# Patient Record
Sex: FEMALE | Race: WHITE | Employment: OTHER | URBAN - METROPOLITAN AREA
[De-identification: names, ages, dates, MRNs, and addresses within clinical notes are randomized per-mention and may not be internally consistent; named-entity substitution may affect disease eponyms.]

---

## 2019-06-09 ENCOUNTER — HOSPITAL ENCOUNTER (INPATIENT)
Age: 79
LOS: 2 days | Discharge: HOME HEALTH | DRG: 065 | End: 2019-06-11
Attending: EMERGENCY MEDICINE | Admitting: INTERNAL MEDICINE
Payer: MEDICARE

## 2019-06-09 ENCOUNTER — APPOINTMENT (OUTPATIENT)
Dept: GENERAL RADIOLOGY | Age: 79
DRG: 065 | End: 2019-06-09
Payer: MEDICARE

## 2019-06-09 ENCOUNTER — APPOINTMENT (OUTPATIENT)
Dept: CT IMAGING | Age: 79
DRG: 065 | End: 2019-06-09
Payer: MEDICARE

## 2019-06-09 DIAGNOSIS — I48.91 ATRIAL FIBRILLATION, UNSPECIFIED TYPE (HCC): ICD-10-CM

## 2019-06-09 DIAGNOSIS — I63.9 CEREBROVASCULAR ACCIDENT (CVA), UNSPECIFIED MECHANISM (HCC): Primary | ICD-10-CM

## 2019-06-09 LAB
ANION GAP SERPL CALCULATED.3IONS-SCNC: 13 MMOL/L (ref 7–16)
APTT: 26.5 SEC (ref 24.5–35.1)
BASOPHILS ABSOLUTE: 0.05 E9/L (ref 0–0.2)
BASOPHILS RELATIVE PERCENT: 0.8 % (ref 0–2)
BILIRUBIN URINE: NEGATIVE
BLOOD, URINE: NEGATIVE
BUN BLDV-MCNC: 25 MG/DL (ref 8–23)
CALCIUM SERPL-MCNC: 9.2 MG/DL (ref 8.6–10.2)
CHLORIDE BLD-SCNC: 104 MMOL/L (ref 98–107)
CLARITY: CLEAR
CO2: 22 MMOL/L (ref 22–29)
COLOR: YELLOW
CREAT SERPL-MCNC: 1.1 MG/DL (ref 0.5–1)
EKG ATRIAL RATE: 83 BPM
EKG Q-T INTERVAL: 362 MS
EKG QRS DURATION: 68 MS
EKG QTC CALCULATION (BAZETT): 442 MS
EKG R AXIS: 56 DEGREES
EKG T AXIS: -17 DEGREES
EKG VENTRICULAR RATE: 90 BPM
EOSINOPHILS ABSOLUTE: 0.15 E9/L (ref 0.05–0.5)
EOSINOPHILS RELATIVE PERCENT: 2.5 % (ref 0–6)
GFR AFRICAN AMERICAN: 58
GFR NON-AFRICAN AMERICAN: 48 ML/MIN/1.73
GLUCOSE BLD-MCNC: 126 MG/DL (ref 74–99)
GLUCOSE URINE: NEGATIVE MG/DL
HCT VFR BLD CALC: 40.8 % (ref 34–48)
HEMOGLOBIN: 13.6 G/DL (ref 11.5–15.5)
IMMATURE GRANULOCYTES #: 0.02 E9/L
IMMATURE GRANULOCYTES %: 0.3 % (ref 0–5)
INR BLD: 1.1
KETONES, URINE: NEGATIVE MG/DL
LEUKOCYTE ESTERASE, URINE: NEGATIVE
LYMPHOCYTES ABSOLUTE: 0.7 E9/L (ref 1.5–4)
LYMPHOCYTES RELATIVE PERCENT: 11.8 % (ref 20–42)
MCH RBC QN AUTO: 29.1 PG (ref 26–35)
MCHC RBC AUTO-ENTMCNC: 33.3 % (ref 32–34.5)
MCV RBC AUTO: 87.2 FL (ref 80–99.9)
MONOCYTES ABSOLUTE: 0.34 E9/L (ref 0.1–0.95)
MONOCYTES RELATIVE PERCENT: 5.7 % (ref 2–12)
NEUTROPHILS ABSOLUTE: 4.66 E9/L (ref 1.8–7.3)
NEUTROPHILS RELATIVE PERCENT: 78.9 % (ref 43–80)
NITRITE, URINE: NEGATIVE
PDW BLD-RTO: 13.7 FL (ref 11.5–15)
PH UA: 5 (ref 5–9)
PLATELET # BLD: 211 E9/L (ref 130–450)
PMV BLD AUTO: 10 FL (ref 7–12)
POTASSIUM REFLEX MAGNESIUM: 3.8 MMOL/L (ref 3.5–5)
PRO-BNP: 1548 PG/ML (ref 0–450)
PROTEIN UA: NEGATIVE MG/DL
PROTHROMBIN TIME: 12.2 SEC (ref 9.3–12.4)
RBC # BLD: 4.68 E12/L (ref 3.5–5.5)
SODIUM BLD-SCNC: 139 MMOL/L (ref 132–146)
SPECIFIC GRAVITY UA: 1.01 (ref 1–1.03)
TROPONIN: <0.01 NG/ML (ref 0–0.03)
TSH SERPL DL<=0.05 MIU/L-ACNC: 2.71 UIU/ML (ref 0.27–4.2)
UROBILINOGEN, URINE: 0.2 E.U./DL
WBC # BLD: 5.9 E9/L (ref 4.5–11.5)

## 2019-06-09 PROCEDURE — 70450 CT HEAD/BRAIN W/O DYE: CPT

## 2019-06-09 PROCEDURE — 93010 ELECTROCARDIOGRAM REPORT: CPT | Performed by: INTERNAL MEDICINE

## 2019-06-09 PROCEDURE — 99223 1ST HOSP IP/OBS HIGH 75: CPT | Performed by: SURGERY

## 2019-06-09 PROCEDURE — 70498 CT ANGIOGRAPHY NECK: CPT

## 2019-06-09 PROCEDURE — 99221 1ST HOSP IP/OBS SF/LOW 40: CPT | Performed by: PSYCHIATRY & NEUROLOGY

## 2019-06-09 PROCEDURE — 93005 ELECTROCARDIOGRAM TRACING: CPT | Performed by: EMERGENCY MEDICINE

## 2019-06-09 PROCEDURE — 81003 URINALYSIS AUTO W/O SCOPE: CPT

## 2019-06-09 PROCEDURE — 85730 THROMBOPLASTIN TIME PARTIAL: CPT

## 2019-06-09 PROCEDURE — 99285 EMERGENCY DEPT VISIT HI MDM: CPT

## 2019-06-09 PROCEDURE — 6360000004 HC RX CONTRAST MEDICATION: Performed by: STUDENT IN AN ORGANIZED HEALTH CARE EDUCATION/TRAINING PROGRAM

## 2019-06-09 PROCEDURE — 84484 ASSAY OF TROPONIN QUANT: CPT

## 2019-06-09 PROCEDURE — 85025 COMPLETE CBC W/AUTO DIFF WBC: CPT

## 2019-06-09 PROCEDURE — 84443 ASSAY THYROID STIM HORMONE: CPT

## 2019-06-09 PROCEDURE — 70496 CT ANGIOGRAPHY HEAD: CPT

## 2019-06-09 PROCEDURE — 36415 COLL VENOUS BLD VENIPUNCTURE: CPT

## 2019-06-09 PROCEDURE — 6360000002 HC RX W HCPCS: Performed by: INTERNAL MEDICINE

## 2019-06-09 PROCEDURE — 6370000000 HC RX 637 (ALT 250 FOR IP): Performed by: SURGERY

## 2019-06-09 PROCEDURE — 80048 BASIC METABOLIC PNL TOTAL CA: CPT

## 2019-06-09 PROCEDURE — 2580000003 HC RX 258: Performed by: INTERNAL MEDICINE

## 2019-06-09 PROCEDURE — 2060000000 HC ICU INTERMEDIATE R&B

## 2019-06-09 PROCEDURE — 83880 ASSAY OF NATRIURETIC PEPTIDE: CPT

## 2019-06-09 PROCEDURE — 85610 PROTHROMBIN TIME: CPT

## 2019-06-09 PROCEDURE — 6370000000 HC RX 637 (ALT 250 FOR IP): Performed by: INTERNAL MEDICINE

## 2019-06-09 PROCEDURE — 71045 X-RAY EXAM CHEST 1 VIEW: CPT

## 2019-06-09 RX ORDER — LISINOPRIL 40 MG/1
40 TABLET ORAL DAILY
COMMUNITY
End: 2019-07-13 | Stop reason: ALTCHOICE

## 2019-06-09 RX ORDER — ASPIRIN 81 MG/1
81 TABLET, CHEWABLE ORAL DAILY
Status: DISCONTINUED | OUTPATIENT
Start: 2019-06-09 | End: 2019-06-11 | Stop reason: HOSPADM

## 2019-06-09 RX ORDER — DORZOLAMIDE HYDROCHLORIDE AND TIMOLOL MALEATE 20; 5 MG/ML; MG/ML
1 SOLUTION/ DROPS OPHTHALMIC 2 TIMES DAILY
Status: DISCONTINUED | OUTPATIENT
Start: 2019-06-09 | End: 2019-06-09 | Stop reason: RX

## 2019-06-09 RX ORDER — HYDROCHLOROTHIAZIDE 25 MG/1
25 TABLET ORAL DAILY
Status: DISCONTINUED | OUTPATIENT
Start: 2019-06-09 | End: 2019-06-11 | Stop reason: HOSPADM

## 2019-06-09 RX ORDER — DORZOLAMIDE HCL 20 MG/ML
1 SOLUTION/ DROPS OPHTHALMIC 2 TIMES DAILY
Status: DISCONTINUED | OUTPATIENT
Start: 2019-06-09 | End: 2019-06-11 | Stop reason: HOSPADM

## 2019-06-09 RX ORDER — SODIUM CHLORIDE 0.9 % (FLUSH) 0.9 %
10 SYRINGE (ML) INJECTION PRN
Status: DISCONTINUED | OUTPATIENT
Start: 2019-06-09 | End: 2019-06-11 | Stop reason: HOSPADM

## 2019-06-09 RX ORDER — SODIUM CHLORIDE 0.9 % (FLUSH) 0.9 %
10 SYRINGE (ML) INJECTION EVERY 12 HOURS SCHEDULED
Status: DISCONTINUED | OUTPATIENT
Start: 2019-06-09 | End: 2019-06-11 | Stop reason: HOSPADM

## 2019-06-09 RX ORDER — ACETAMINOPHEN 325 MG/1
325 TABLET ORAL EVERY 4 HOURS PRN
Status: DISCONTINUED | OUTPATIENT
Start: 2019-06-09 | End: 2019-06-11 | Stop reason: HOSPADM

## 2019-06-09 RX ORDER — HYDROCHLOROTHIAZIDE 25 MG/1
25 TABLET ORAL DAILY
COMMUNITY
End: 2019-07-13 | Stop reason: ALTCHOICE

## 2019-06-09 RX ORDER — LISINOPRIL 20 MG/1
40 TABLET ORAL DAILY
Status: DISCONTINUED | OUTPATIENT
Start: 2019-06-09 | End: 2019-06-11 | Stop reason: HOSPADM

## 2019-06-09 RX ORDER — TIMOLOL MALEATE 5 MG/ML
1 SOLUTION/ DROPS OPHTHALMIC 2 TIMES DAILY
Status: DISCONTINUED | OUTPATIENT
Start: 2019-06-09 | End: 2019-06-11 | Stop reason: HOSPADM

## 2019-06-09 RX ORDER — LATANOPROST 50 UG/ML
1 SOLUTION/ DROPS OPHTHALMIC NIGHTLY
Status: DISCONTINUED | OUTPATIENT
Start: 2019-06-09 | End: 2019-06-11 | Stop reason: HOSPADM

## 2019-06-09 RX ADMIN — ASPIRIN 81 MG 81 MG: 81 TABLET ORAL at 17:43

## 2019-06-09 RX ADMIN — LATANOPROST 1 DROP: 50 SOLUTION OPHTHALMIC at 23:12

## 2019-06-09 RX ADMIN — TIMOLOL MALEATE 1 DROP: 5 SOLUTION OPHTHALMIC at 22:05

## 2019-06-09 RX ADMIN — ENOXAPARIN SODIUM 40 MG: 40 INJECTION SUBCUTANEOUS at 17:43

## 2019-06-09 RX ADMIN — IOPAMIDOL 60 ML: 755 INJECTION, SOLUTION INTRAVENOUS at 10:50

## 2019-06-09 RX ADMIN — Medication 10 ML: at 22:05

## 2019-06-09 RX ADMIN — DORZOLAMIDE HYDROCHLORIDE 1 DROP: 20 SOLUTION/ DROPS OPHTHALMIC at 22:04

## 2019-06-09 ASSESSMENT — PAIN DESCRIPTION - RADICULAR PAIN: RADICULAR_PAIN: ABSENT

## 2019-06-09 ASSESSMENT — PAIN SCALES - GENERAL
PAINLEVEL_OUTOF10: 0
PAINLEVEL_OUTOF10: 0

## 2019-06-09 NOTE — ED NOTES
Bed: 05  Expected date:   Expected time:   Means of arrival:   Comments:  marge Stratton RN  06/09/19 6520

## 2019-06-09 NOTE — CONSULTS
Inpatient consult to Neurology  Consult performed by: Genet Celaya MD  Consult ordered by: Rodri Hickman MD        Neurology Consult Note    6/9/2019     REASON FOR CONSULTATION:   Stroke     HISTORY OF PRESENT ILLNESS:   A [de-identified]year old woman presented to ED as the feeling of touch on her right hand was not normal. Last known well was 3 PM on 6/8/2019. NIHSS today in AM was calculated as 6: mainly for weakness, numbness and ataxia in the right side and mild to moderate aphasia. ECG showed new onset Afib. And I talked to her, she didn't acknowledge any right hand numbness. But she said that she had problem in her vision worse than her baseline in both eyes and problem into her speech since yesterday. Chest pain or shortness of breath. CTA showed: Severe intracranial atherosclerotic disease. Estimated stenosis of the proximal left internal carotid artery by NASCET criteria is greater than 70% on the left         Past Medical History:   Diagnosis Date    Arthritis     Hypertension     UTI (lower urinary tract infection)         Past Surgical History:   Procedure Laterality Date    APPENDECTOMY         Prior to Admission medications    Medication Sig Start Date End Date Taking?  Authorizing Provider   lisinopril (PRINIVIL;ZESTRIL) 40 MG tablet Take 40 mg by mouth daily   Yes Historical Provider, MD   hydrochlorothiazide (HYDRODIURIL) 25 MG tablet Take 25 mg by mouth daily   Yes Historical Provider, MD   vitamin D (CHOLECALCIFEROL) 1000 UNIT TABS tablet Take 1,000 Units by mouth daily   Yes Historical Provider, MD   dorzolamide-timolol (COSOPT) 22.3-6.8 MG/ML ophthalmic solution 1 drop 2 times daily   Yes Historical Provider, MD   latanoprost (XALATAN) 0.005 % ophthalmic solution 1 drop nightly   Yes Historical Provider, MD   Hyaluronic Acid-Vitamin C (HYALURONIC ACID PO) Take 1 capsule by mouth Chewable caps    Historical Provider, MD   Probiotic Product (PROBIOTIC DAILY PO) Take 1 tablet by mouth Historical Provider, MD   cyanocobalamin 1000 MCG tablet Take 10,000 mcg by mouth daily Indications: patient otc bottle reads [id number]     Historical Provider, MD       Allergies:  Erythromycin     No family history on file. Social History     Tobacco Use    Smoking status: Never Smoker   Substance Use Topics    Alcohol use: No    Drug use: No          ROS:  14 points review of sytem were checked and were neg except as above      EXAMINATION:  BP (!) 146/76   Pulse 97   Temp 98 °F (36.7 °C) (Temporal)   Resp 20   Ht 5' 3\" (1.6 m)   Wt 164 lb (74.4 kg)   SpO2 97%   BMI 29.05 kg/m²     AAOx3, follows commands, mild expressive aphasia  PERRL, EOMI, VFF, normal saccades and pursuit, no gaze preference/nystagmus. Intact facial sensation, no asymmetry. Intact hearing bilaterally. Tongue midline. Symmetric palate elevation. Neck muscles and shoulder shrug 5/5. Sensation: In examination she acknowledges right side is not feeling as normal as the left side. , no neglect. Motor: 5/5 all four extremities. Normal bulk and tone, No drift/orbiting. DTRs: +1 biceps/triceps/BR/Knees, +1 ankles, plantars down B/L. Coordination: intact F-N and H-S B/L. No dysmetria. Intact CARLOS.    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 Pending LDL and HbA1C  From neurology standpoint is ok to start [facility] now. We can not be sure if LICA stenosis is \"symptomatic\" or not. Obtain a brain MRI without contrast which may help in differentiating the source of his stroke as cardioembolic versus carotid.     Electronically signed by Shantell Rao MD on 6/9/2019 at 5:13 PM

## 2019-06-09 NOTE — ED PROVIDER NOTES
Department of Emergency Medicine   ED  Provider Note  Admit Date/RoomTime: 6/9/2019  9:07 AM  ED Room: 8514/8514-B      History of Present Illness:  6/9/19, Time: 9:33 AM         Lauro Schilder is a [de-identified] y.o. female presenting to the ED for stroke like symptoms, beginning yesterday. The complaint has been constant, moderate in severity, and worsened by nothing. The pt reports she last felt normal at 3 pm yesterday. She states that her right upper face feels different when she touches it. Per EMS report, the pt had memory problems at home, and her daughter reported she had trouble speaking. She denies recent illness, fever, cough, trouble swallowing, headache, chest pain, abdominal pain, nausea, or vomiting. Pt denies hx of CVA. She is not on Anticoagulants. Review of Systems:   Pertinent positives and negatives are stated within HPI, all other systems reviewed and are negative.    --------------------------------------------- PAST HISTORY ---------------------------------------------  Past Medical History:  has a past medical history of Arthritis, Hypertension, and UTI (lower urinary tract infection). Past Surgical History:  has a past surgical history that includes Appendectomy. Social History:  reports that she has never smoked. She does not have any smokeless tobacco history on file. She reports that she does not drink alcohol or use drugs. Family History: family history is not on file. The patients home medications have been reviewed. Allergies: Erythromycin    ---------------------------------------------------PHYSICAL EXAM--------------------------------------    Constitutional/General: Alert and oriented x3, well appearing, non toxic in NAD  Head: Normocephalic and atraumatic  Eyes: PERRL, EOMI, conjunctiva normal, sclera non icteric  Mouth: Oropharynx clear  Neck: Supple  Respiratory: Lungs clear to auscultation bilaterally, no wheezes, rales, or rhonchi.  Not in respiratory distress  Cardiovascular:  Regular rate. Regular rhythm. No murmurs, gallops, or rubs. 2+ distal pulses  GI:  Abdomen Soft, Non tender, Non distended. +BS. No rebound, guarding, or rigidity. No pulsatile masses. Musculoskeletal: Warm and well perfused, no clubbing, cyanosis, or edema. Integument: Skin warm and dry. No rashes. Neurologic: See NIH scale below  Psychiatric: Normal Affect    Last known well time: 3 pm yesterday  NIH Stroke Scale at time of initial evaluation: 6  1A: Level of Consciousness 0 - alert; keenly responsive   1B: Ask Month and Age 1 - answers one question correctly   1C: Tell Patient To Open and Close Eyes, then Hand  Squeeze 0 - performs both tasks correctly   2: Test Horizontal Extraocular Movements 0 - normal   3: Test Visual Fields 0 - no visual loss   4: Test Facial Palsy 0 - normal symmetric movement   5A: Test Left Arm Motor Drift 0 - no drift, limb holds 90 (or 45) degrees for full 10 seconds   5B: Test Right Arm Motor Drift 1 - drift, limb holds 90 (or 45) degrees but drifts down before full 10 seconds: does not hit bed   6A: Test Left Leg Motor Drift 0 - no drift; leg holds 30 degree position for full 5 seconds   6B: Test Right Leg Motor Drift 1 - drift; leg falls by the end of the 5 second period but does not hit bed   7: Test Limb Ataxia   (FNF/Heel-Shin) 1 - present in one limb   8: Test Sensation 1 - mild to moderate sensory loss; patient feels pinprick is less sharp or is dull on the affected side; there is a loss of superficial pain with pinprick but patient is aware of being touched    9: Test Language/Aphasia 1 - mild to moderate aphasia; some obvious loss of fluency or facility of comprehension without significant limitation on ideas expressed or form of expression. Reduction of speech and/or comprehension, however, makes conversation about provided materials difficult or impossible.   For example, in conversation about provided materials, examiner can identify Differential   Result Value Ref Range    WBC 5.9 4.5 - 11.5 E9/L    RBC 4.68 3.50 - 5.50 E12/L    Hemoglobin 13.6 11.5 - 15.5 g/dL    Hematocrit 40.8 34.0 - 48.0 %    MCV 87.2 80.0 - 99.9 fL    MCH 29.1 26.0 - 35.0 pg    MCHC 33.3 32.0 - 34.5 %    RDW 13.7 11.5 - 15.0 fL    Platelets 103 067 - 202 E9/L    MPV 10.0 7.0 - 12.0 fL    Neutrophils % 78.9 43.0 - 80.0 %    Immature Granulocytes % 0.3 0.0 - 5.0 %    Lymphocytes % 11.8 (L) 20.0 - 42.0 %    Monocytes % 5.7 2.0 - 12.0 %    Eosinophils % 2.5 0.0 - 6.0 %    Basophils % 0.8 0.0 - 2.0 %    Neutrophils # 4.66 1.80 - 7.30 E9/L    Immature Granulocytes # 0.02 E9/L    Lymphocytes # 0.70 (L) 1.50 - 4.00 E9/L    Monocytes # 0.34 0.10 - 0.95 E9/L    Eosinophils # 0.15 0.05 - 0.50 E9/L    Basophils # 0.05 0.00 - 0.20 X3/R   Basic Metabolic Panel w/ Reflex to MG   Result Value Ref Range    Sodium 139 132 - 146 mmol/L    Potassium reflex Magnesium 3.8 3.5 - 5.0 mmol/L    Chloride 104 98 - 107 mmol/L    CO2 22 22 - 29 mmol/L    Anion Gap 13 7 - 16 mmol/L    Glucose 126 (H) 74 - 99 mg/dL    BUN 25 (H) 8 - 23 mg/dL    CREATININE 1.1 (H) 0.5 - 1.0 mg/dL    GFR Non-African American 48 >=60 mL/min/1.73    GFR African American 58     Calcium 9.2 8.6 - 10.2 mg/dL   Troponin   Result Value Ref Range    Troponin <0.01 0.00 - 0.03 ng/mL   Brain Natriuretic Peptide   Result Value Ref Range    Pro-BNP 1,548 (H) 0 - 450 pg/mL   APTT   Result Value Ref Range    aPTT 26.5 24.5 - 35.1 sec   Protime-INR   Result Value Ref Range    Protime 12.2 9.3 - 12.4 sec    INR 1.1    Urinalysis, reflex to microscopic   Result Value Ref Range    Color, UA Yellow Straw/Yellow    Clarity, UA Clear Clear    Glucose, Ur Negative Negative mg/dL    Bilirubin Urine Negative Negative    Ketones, Urine Negative Negative mg/dL    Specific Gravity, UA 1.015 1.005 - 1.030    Blood, Urine Negative Negative    pH, UA 5.0 5.0 - 9.0    Protein, UA Negative Negative mg/dL    Urobilinogen, Urine 0.2 <2.0 E.U./dL Nitrite, Urine Negative Negative    Leukocyte Esterase, Urine Negative Negative   TSH without Reflex   Result Value Ref Range    TSH 2.710 0.270 - 4.200 uIU/mL   EKG 12 Lead   Result Value Ref Range    Ventricular Rate 90 BPM    Atrial Rate 83 BPM    QRS Duration 68 ms    Q-T Interval 362 ms    QTc Calculation (Bazett) 442 ms    R Axis 56 degrees    T Axis -17 degrees       RADIOLOGY:  Interpreted by Radiologist.  CT Head WO Contrast   Final Result   Diffuse atrophy likely age related   Findings compatible with small vessel ischemic changes. Findings compatible with a most likely subacute lacunar infarct in the   left thalamus            CTA Head W Contrast   Final Result   1. Severe atherosclerotic disease . 2. Estimated stenosis of the proximal left internal carotid artery by   NASCET criteria is greater than 70% on the left               CTA NECK W CONTRAST   Final Result   1. Severe atherosclerotic disease . 2. Estimated stenosis of the proximal left internal carotid artery by   NASCET criteria is greater than 70% on the left               XR CHEST PORTABLE   Final Result   Cardiomegaly   Findings compatible with atherosclerotic disease of the aorta. No acute infiltrate                               ------------------------- NURSING NOTES AND VITALS REVIEWED ---------------------------   The nursing notes within the ED encounter and vital signs as below have been reviewed by myself. BP (!) 146/76   Pulse 97   Temp 98 °F (36.7 °C) (Temporal)   Resp 20   Ht 5' 3\" (1.6 m)   Wt 164 lb (74.4 kg)   SpO2 97%   BMI 29.05 kg/m²   Oxygen Saturation Interpretation: Normal    The patients available past medical records and past encounters were reviewed.       ------------------------------ ED COURSE/MEDICAL DECISION MAKING----------------------  Medications   sodium chloride flush 0.9 % injection 10 mL (has no administration in time range)   sodium chloride flush 0.9 % injection 10 mL (has no administration in time range)   hydrochlorothiazide (HYDRODIURIL) tablet 25 mg (25 mg Oral Not Given 6/9/19 1504)   lisinopril (PRINIVIL;ZESTRIL) tablet 40 mg (40 mg Oral Not Given 6/9/19 1504)   latanoprost (XALATAN) 0.005 % ophthalmic solution 1 drop (has no administration in time range)   dorzolamide (TRUSOPT) 2 % ophthalmic solution 1 drop (has no administration in time range)     And   timolol (TIMOPTIC) 0.5 % ophthalmic solution 1 drop (has no administration in time range)   acetaminophen (TYLENOL) tablet 325 mg (has no administration in time range)   enoxaparin (LOVENOX) injection 40 mg (has no administration in time range)   iopamidol (ISOVUE-370) 76 % injection 60 mL (60 mLs Intravenous Given 6/9/19 1050)       Medical Decision Making/ED Course:   Patient is an 75-year-old who presents to the ED for facial numbness and trouble speaking. Last known well 3 pm yesterday. Stroke team was not called because patient's last known normal was greater than 3 hours ago, and she is not a TPA candidate. NIH in the ED was 6. EKG showed new onset A. fib. Rate was controlled. Imaging of her brain shows concerns for acute/subacute lacunar infarct. Creatinine mildly elevated. No prior for comparison. BNP elevated. No CHF on CXR. Will be admitted for further management. ED Course as of Jun 09 1608   Isabelle Manner Jun 09, 2019   0952 EKG: This EKG is signed and interpreted by me. Rate: 90  Rhythm: Atrial fibrillation  Interpretation: atrial fibrillation (new onset)  Comparison: no previous EKG available      [JA]   1222 Spoke with Dr. Robert Millan. Will admit. [JA]      ED Course User Index  [JA] Carlos Scott MD       This patient's ED course included: a personal history and physicial examination and re-evaluation prior to disposition    This patient has remained hemodynamically stable during their ED course. Re-Evaluations:           Re-evaluation. Patients symptoms show no change      Counseling:    The emergency provider has spoken with the patient and discussed todays results, in addition to providing specific details for the plan of care and counseling regarding the diagnosis and prognosis. Questions are answered at this time and they are agreeable with the plan.     --------------------------------- IMPRESSION AND DISPOSITION ---------------------------------    IMPRESSION  1. [facility]    2. Atrial fibrillation, unspecified type ([facility]        DISPOSITION  Disposition: Admit to telemetry  Patient condition is stable    6/9/19, 9:33 AM.    This note is prepared by Shama Russo, acting as Scribe for Mikey Crenshaw MD.    Mikey Crenshaw MD:  The scribe's documentation has been prepared under my direction and personally reviewed by me in its entirety. I confirm that the note above accurately reflects all work, treatment, procedures, and medical decision making performed by me.          Mikey Crenshaw MD  06/09/19 7961

## 2019-06-09 NOTE — CONSULTS
Vascular Surgery Consult Note      Chief Complaint: Carotid artery disease in the face of possible stroke    HISTORY OF PRESENT ILLNESS:                The patient is a [de-identified] y.o. female who presents to the hospital with stroke like symptoms. These began sometime today prior to admission. She reports difficulty walking. She also felt that her right side of her face felt unusual. She described right upper extremity weakness and right lower extremity weakness. All of which she believes has improved. Her daughter also reported that she had some difficulty with speech. On today's examination. Currently she denies any speech changes she denies any pain or discomfort she denies any history of atrial fibrillation however she has atrial fibrillation currently. She states overall she's feeling better and regained most of her right side weakness. She also denies any current speech issues.     Past Medical History:   Diagnosis Date    Arthritis     Hypertension     UTI (lower urinary tract infection)         Past Surgical History:   Procedure Laterality Date    APPENDECTOMY         Current Medications:     Current Facility-Administered Medications:     sodium chloride flush 0.9 % injection 10 mL, 10 mL, Intravenous, 2 times per day, Susa Bumpers, MD    sodium chloride flush 0.9 % injection 10 mL, 10 mL, Intravenous, PRN, Susa Bumpers, MD    hydrochlorothiazide (HYDRODIURIL) tablet 25 mg, 25 mg, Oral, Daily, Susa Bumpers, MD    lisinopril (PRINIVIL;ZESTRIL) tablet 40 mg, 40 mg, Oral, Daily, Susa Bumpers, MD    latanoprost (XALATAN) 0.005 % ophthalmic solution 1 drop, 1 drop, Both Eyes, Nightly, Susa Bumpers, MD    dorzolamide (TRUSOPT) 2 % ophthalmic solution 1 drop, 1 drop, Both Eyes, BID **AND** timolol (TIMOPTIC) 0.5 % ophthalmic solution 1 drop, 1 drop, Both Eyes, BID, Susa Bumpers, MD    acetaminophen (TYLENOL) tablet 325 mg, 325 mg, Oral, Q4H PRN, Susa Bumpers, MD    enoxaparin (LOVENOX) injection 40 mg, 40 mg, Subcutaneous, Daily, Iglesia Smith MD    aspirin chewable tablet 81 mg, 81 mg, Oral, Daily, Isacc Nuñez MD    Allergies:  Erythromycin    Social History     Socioeconomic History    Marital status:      Spouse name: Not on file    Number of children: Not on file    Years of education: Not on file    Highest education level: Not on file   Occupational History    Not on file   Social Needs    Financial resource strain: Not on file    Food insecurity:     Worry: Not on file     Inability: Not on file    Transportation needs:     Medical: Not on file     Non-medical: Not on file   Tobacco Use    Smoking status: Never Smoker   Substance and Sexual Activity    Alcohol use: No    Drug use: No    Sexual activity: Not on file   Lifestyle    Physical activity:     Days per week: Not on file     Minutes per session: Not on file    Stress: Not on file   Relationships    Social connections:     Talks on phone: Not on file     Gets together: Not on file     Attends Hinduism service: Not on file     Active member of club or organization: Not on file     Attends meetings of clubs or organizations: Not on file     Relationship status: Not on file    Intimate partner violence:     Fear of current or ex partner: Not on file     Emotionally abused: Not on file     Physically abused: Not on file     Forced sexual activity: Not on file   Other Topics Concern    Not on file   Social History Narrative    Not on file        No family history on file.       REVIEW OF SYSTEMS:    Gen: Negative for nausea, vomiting, diarrhea, fever, chills, night sweats, no weight loss or weight gain  HEENT: Negative for double vision, blurred vision, sore throat   Heart: Negative for HTN, palpitations, chest pain, or pain radiating to the arm, jaw or teeth  Lungs: Negative for wheezes, shortness of breath while at rest or lying down  GI: Negative for nausea, vomiting, diarrhea, or constipation  : Negative for dysuria, hematuria, increased frequency or urgency  Endo: Negative for polydipsia, polyuria, or heat or cold intolerances. Heme: Negative leg swelling, blood or bleeding disorders  Psych: Negative for Depression or anxiety  Ortho: Negative for pain in the joints, arthritis or gout  Vascular: Negative for claudication, calf pain, ulcerations, or rest pain. He also denies any nonhealing lower extremity wounds    Please see HPI        PHYSICAL EXAM:    Vitals:    06/09/19 1519   BP: (!) 146/76   Pulse: 97   Resp: 20   Temp: 98 °F (36.7 °C)   SpO2:      GENERAL APPEARANCE:  Well-developed well-nourished alert and oriented answers questions appropriately the patient does not appear in any acute distress. HEAD: Head is normocephalic atraumatic, with Normal range of motion. EYES: Inspection of the conjunctiva and lids demonstrated no abnormalities, no jaundice, no scleral icterus, PERRL, EOMI, and vision are grossly intact. EARS:  External auditory canals demonstrate no abnormalities. Ears are well set hearing is grossly intact. SKIN: Normal in color, texture, and turgor, no visible lesions, no jaundice. NECK: Supple nontender trachea is midline no jugular venous distention. She has a left carotid bruit that is present  LUNGS:  Clear to auscultation bilaterally no wheezes rales or rhonchi good respiratory changes noted. CARDIOVASCULAR: Currently regular rate and rhythm no murmur rub or gallop that I could appreciate. ABDOMEN: Soft nontender no rebound or guarding, positive bowel sounds no pulsatile abdominal masses. No organosplenomegaly that I can appreciate. EXTREMITIES: Bilateral palpable brachial radial pulses are symmetric at 3+. Bilateral palpable DP and PTs are easily palpable 3+. Upper extremities demonstrate mild right sided upper extremity weakness with flexion and extension but it's very difficult to tell. She has mild right lower extremity weakness with dorsiflexion plantar flexion.  As well as leg abduction. MUSKULOSKELETAL  adequately aligned spine, range of motion appears to be intact with regards to the spine and upper and lower extremities. No joint tenderness or erythema. Normal muscular development. NEURO: Cranial nerves II through XII grossly intact. Strength and sensation are symmetric and intact throughout. Psychiatric: Mental examination revealed the patient was oriented to person, place, and time. The patient was able to demonstrate adequate judgment and reason, without any hallucinations abnormal affect or abnormal behavior on today's exam.      LABS:    Lab Results   Component Value Date    WBC 5.9 06/09/2019    HGB 13.6 06/09/2019    HCT 40.8 06/09/2019     06/09/2019    PROTIME 12.2 06/09/2019    INR 1.1 06/09/2019    APTT 26.5 06/09/2019    K 3.8 06/09/2019    BUN 25 (H) 06/09/2019    CREATININE 1.1 (H) 06/09/2019       RADIOLOGY:  CT Head WO Contrast   Final Result   Diffuse atrophy likely age related   Findings compatible with small vessel ischemic changes. Findings compatible with a most likely subacute lacunar infarct in the   left thalamus            CTA Head W Contrast   Final Result   1. Severe atherosclerotic disease . 2. Estimated stenosis of the proximal left internal carotid artery by   NASCET criteria is greater than 70% on the left               CTA NECK W CONTRAST   Final Result   1. Severe atherosclerotic disease . 2. Estimated stenosis of the proximal left internal carotid artery by   NASCET criteria is greater than 70% on the left               XR CHEST PORTABLE   Final Result   Cardiomegaly   Findings compatible with atherosclerotic disease of the aorta. No acute infiltrate                               IMPRESSION:   Active Hospital Problems    Diagnosis    Acute CVA (cerebrovascular accident) ([facility] [I63.9]       PLAN:  #1 strokelike symptoms that have improved. At this point she's being worked up for a stroke.  I reviewed her CT scan she has high-grade left carotid artery stenosis. Of approximately 80+ percent. She has a dominant left vertebral artery. I personally reviewed the CT scan findings myself. I have placed on aspirin 81 mg. She was not taking any aspirin up until this point. She is an ex-smoker so smoking is out of the equation. I will also consult to neurology for additional opinion    She had a CT scan demonstrating what appears to be a subacute lacunar infarct in the left thalamus. We will await neurology's input. We also recommend wrist reduction therapy and statin therapy. This treatment plan was discussed with the patient and family at the bedside. I reviewed the procedure with the patient. I discussed the risks, benefits, and alternatives of the procedure. The patient understands and consents. All questions were answered.       Electronically signed by Zion Mirza MD on 6/9/2019 at 5:39 PM

## 2019-06-10 ENCOUNTER — APPOINTMENT (OUTPATIENT)
Dept: MRI IMAGING | Age: 79
DRG: 065 | End: 2019-06-10
Payer: MEDICARE

## 2019-06-10 LAB
CHOLESTEROL, TOTAL: 244 MG/DL (ref 0–199)
HBA1C MFR BLD: 5.8 % (ref 4–5.6)
HDLC SERPL-MCNC: 46 MG/DL
LDL CHOLESTEROL CALCULATED: 167 MG/DL (ref 0–99)
TRIGL SERPL-MCNC: 153 MG/DL (ref 0–149)
VLDLC SERPL CALC-MCNC: 31 MG/DL

## 2019-06-10 PROCEDURE — 80061 LIPID PANEL: CPT

## 2019-06-10 PROCEDURE — 2580000003 HC RX 258: Performed by: INTERNAL MEDICINE

## 2019-06-10 PROCEDURE — 6370000000 HC RX 637 (ALT 250 FOR IP): Performed by: INTERNAL MEDICINE

## 2019-06-10 PROCEDURE — 99223 1ST HOSP IP/OBS HIGH 75: CPT | Performed by: INTERNAL MEDICINE

## 2019-06-10 PROCEDURE — 2060000000 HC ICU INTERMEDIATE R&B

## 2019-06-10 PROCEDURE — 97165 OT EVAL LOW COMPLEX 30 MIN: CPT

## 2019-06-10 PROCEDURE — 97530 THERAPEUTIC ACTIVITIES: CPT

## 2019-06-10 PROCEDURE — 83036 HEMOGLOBIN GLYCOSYLATED A1C: CPT

## 2019-06-10 PROCEDURE — 36415 COLL VENOUS BLD VENIPUNCTURE: CPT

## 2019-06-10 PROCEDURE — 70551 MRI BRAIN STEM W/O DYE: CPT

## 2019-06-10 PROCEDURE — APPSS45 APP SPLIT SHARED TIME 31-45 MINUTES: Performed by: PHYSICIAN ASSISTANT

## 2019-06-10 PROCEDURE — 6360000002 HC RX W HCPCS: Performed by: INTERNAL MEDICINE

## 2019-06-10 PROCEDURE — 97162 PT EVAL MOD COMPLEX 30 MIN: CPT

## 2019-06-10 PROCEDURE — 6370000000 HC RX 637 (ALT 250 FOR IP): Performed by: SURGERY

## 2019-06-10 RX ORDER — METOPROLOL SUCCINATE 25 MG/1
25 TABLET, EXTENDED RELEASE ORAL 2 TIMES DAILY
Status: DISCONTINUED | OUTPATIENT
Start: 2019-06-10 | End: 2019-06-11 | Stop reason: HOSPADM

## 2019-06-10 RX ORDER — ATORVASTATIN CALCIUM 40 MG/1
40 TABLET, FILM COATED ORAL NIGHTLY
Status: DISCONTINUED | OUTPATIENT
Start: 2019-06-10 | End: 2019-06-11 | Stop reason: HOSPADM

## 2019-06-10 RX ADMIN — DORZOLAMIDE HYDROCHLORIDE 1 DROP: 20 SOLUTION/ DROPS OPHTHALMIC at 20:53

## 2019-06-10 RX ADMIN — APIXABAN 5 MG: 5 TABLET, FILM COATED ORAL at 10:50

## 2019-06-10 RX ADMIN — TIMOLOL MALEATE 1 DROP: 5 SOLUTION OPHTHALMIC at 20:53

## 2019-06-10 RX ADMIN — HYDROCHLOROTHIAZIDE 25 MG: 25 TABLET ORAL at 08:46

## 2019-06-10 RX ADMIN — LISINOPRIL 40 MG: 20 TABLET ORAL at 08:46

## 2019-06-10 RX ADMIN — TIMOLOL MALEATE 1 DROP: 5 SOLUTION OPHTHALMIC at 08:46

## 2019-06-10 RX ADMIN — METOPROLOL SUCCINATE 25 MG: 25 TABLET, EXTENDED RELEASE ORAL at 10:50

## 2019-06-10 RX ADMIN — ASPIRIN 81 MG 81 MG: 81 TABLET ORAL at 08:46

## 2019-06-10 RX ADMIN — DORZOLAMIDE HYDROCHLORIDE 1 DROP: 20 SOLUTION/ DROPS OPHTHALMIC at 08:46

## 2019-06-10 RX ADMIN — ACETAMINOPHEN 325 MG: 325 TABLET, FILM COATED ORAL at 17:35

## 2019-06-10 RX ADMIN — APIXABAN 5 MG: 5 TABLET, FILM COATED ORAL at 20:52

## 2019-06-10 RX ADMIN — ENOXAPARIN SODIUM 40 MG: 40 INJECTION SUBCUTANEOUS at 08:46

## 2019-06-10 RX ADMIN — METOPROLOL SUCCINATE 25 MG: 25 TABLET, EXTENDED RELEASE ORAL at 20:52

## 2019-06-10 RX ADMIN — LATANOPROST 1 DROP: 50 SOLUTION OPHTHALMIC at 20:52

## 2019-06-10 RX ADMIN — ATORVASTATIN CALCIUM 40 MG: 40 TABLET, FILM COATED ORAL at 20:52

## 2019-06-10 RX ADMIN — Medication 10 ML: at 20:52

## 2019-06-10 ASSESSMENT — PAIN SCALES - GENERAL
PAINLEVEL_OUTOF10: 0
PAINLEVEL_OUTOF10: 5
PAINLEVEL_OUTOF10: 5
PAINLEVEL_OUTOF10: 0

## 2019-06-10 NOTE — H&P
Susa Bumpers MD FACP   History and Physical      CHIEF COMPLAINT:    Right-sided weakness    HISTORY OF PRESENT ILLNESS:    40-year-old  woman seen at [facility] today on [address] [facility],[address]  No family member available  Data reviewed in detail  [place] [facility]  40-year-old woman with a known history of hypertension presented with intermittent weakness on the right side  CT scan confirmed a left thalamic stroke  Patient when arrived in the ER had no further deficits  However she was found to be in atrial fibrillation which is new onset  Duration unknown because patient does not have symptoms from it  She feels fine this morning  Denied angina dysarthria or palpitations  Admitted for further management  Past Medical History:    Past Medical History:   Diagnosis Date    Arthritis     Hypertension     UTI (lower urinary tract infection)        Past Surgical History:    Past Surgical History:   Procedure Laterality Date    APPENDECTOMY         Medications Prior to Admission:    Medications Prior to Admission: lisinopril (PRINIVIL;ZESTRIL) 40 MG tablet, Take 40 mg by mouth daily  hydrochlorothiazide (HYDRODIURIL) 25 MG tablet, Take 25 mg by mouth daily  vitamin D (CHOLECALCIFEROL) 1000 UNIT TABS tablet, Take 1,000 Units by mouth daily  dorzolamide-timolol (COSOPT) 22.3-6.8 MG/ML ophthalmic solution, 1 drop 2 times daily  latanoprost (XALATAN) 0.005 % ophthalmic solution, 1 drop nightly  Hyaluronic Acid-Vitamin C (HYALURONIC ACID PO), Take 1 capsule by mouth Chewable caps  Probiotic Product (PROBIOTIC DAILY PO), Take 1 tablet by mouth  cyanocobalamin 1000 MCG tablet, Take 10,000 mcg by mouth daily Indications: patient otc bottle reads [id number]     Allergies:    Erythromycin    Social History:    reports that she has never smoked. She does not have any smokeless tobacco history on file. She reports that she does not drink alcohol or use drugs.     Family History: family history is not on file. REVIEW OF SYSTEMS:  As above in the HPI, otherwise negative    PHYSICAL EXAM:    Vitals:  /77   Pulse 100   Temp 97.7 °F (36.5 °C) (Temporal)   Resp 20   Ht 5' 3\" (1.6 m)   Wt 164 lb (74.4 kg)   SpO2 95%   BMI 29.05 kg/m²     General:  Awake, alert, oriented X 3. Well developed, well nourished, well groomed. No apparent distress. HEENT:  Normocephalic, atraumatic. Pupils equal, round, reactive to light. No scleral icterus. No conjunctival injection. No nasal discharge. Neck:  Supple  Heart: irregular but not rapid  Lungs:  CTA bilaterally, bilat symmetrical expansion, no wheeze, rales, or rhonchi  Abdomen: Bowel sounds present, soft, nontender, no masses, no organomegaly, no peritoneal signs  Extremities:  No clubbing, cyanosis, or edema  Skin:  Warm and dry, no open lesions or rash  Neuro:  Cranial nerves 2-12 intact, no focal deficits  Breast: deferred  Rectal: deferred  Genitalia:  deferred    LABS:  Reviewed in detail      ASSESSMENT:    Subacute left thalamic stroke with no residual deficits  New onset atrial fibrillation duration unknown, patient asymptomatic  Essential hypertension  Left carotid disease          PLAN:  Reviewed in detail with the patient   rate control for the time being of A. Fib  Beta blocker  Anticoagulant therapy  ?  DC CV  BP control  PTOT consults  Echocardiogram  Appropriate consultations    Phil Britt  1:38 PM  6/10/2019

## 2019-06-10 NOTE — PROGRESS NOTES
Physical Therapy  Initial Assessment     Name: Rich Oakley  : 1940  MRN: [id number]    Date of Service: 6/10/2019    Evaluating PT: Nathalie Mcdaniels, PT, DPT [id number]    Room #:  4003/2572-J    Diagnosis: Acute CVA  Precautions: Fall risk    Pt lives with daughter in a single story house with 3 stair(s) and 1 rail(s) to enter. Bed is on the first floor and bath is on the first floor. Pt ambulated without AD Independent prior to admission. Pt used SPC on day of admission due to unsteadiness. Initial Evaluation  Date: 6/10/19 Treatment Date: Short Term/ Long Term   Goals   AM-PAC 6 Clicks 35/     Was pt agreeable to Eval/treatment? Yes     Does pt have pain? No current complaints of pain     Bed Mobility  Rolling: Supervision  Supine to sit: Supervision  Sit to supine: Supervision  Scooting: Supervision toward EOB  Rolling: Independent   Supine to sit: Independent   Sit to supine: Independent   Scooting: Independent    Transfers Sit to stand: SBA  Stand to sit: SBA  Stand pivot: Min A with SPC and with [employer]  Sit to stand: Supervision  Stand to sit: Supervision  Stand pivot: Supervision with [employer]   Ambulation   50 feet with SPC with Min A  75 feet with [employer] with Min A  200 feet with [employer] with Supervision   Stair negotiation: 10 steps with 1 rail(s) with Min A  10 step(s) with 1 rail(s) with Supervision   ROM B UE: Refer to OT note  B LE: WFL     Strength B UE: Refer to OT note  B LE: 4/5 globally     Balance Sitting EOB: Supervision  Dynamic standing: Min A with SPC and [employer]  Sitting EOB: Independent   Dynamic standing: Supervision with [employer]     Pt is A & O x: 4 to person, place, month/year, and situation. Sensation: Pt reports B hand numbness and tingling. Coordination: NT  Edema: Unremarkable. ASSESSMENT    Patient education  Pt educated on proper technique/safety with [employer] and awareness of obstacles on R side when ambulating.     Patient response to education:   Pt verbalized understanding Pt demonstrated skill Pt requires further education in this area   Yes With cueing Yes     Comments:  Pt was supine in bed upon room entry, agreeable to PT evaluation with OT collaboration. Pt initially ambulated with SPC with poor technique. Pt was mildly unsteady and had R sided sway. Pt negotiated stairs with nonreciprocal step pattern and fair steadiness with rail. Pt had mild LOB that required Min A and support from wall to correct once she ascended stairs. Pt descended stairs with no LOB. Pt was provided [employer]. Steadiness improved but pt deviated to R when ambulating in hallway; pt cued to correct. Pt has mild R inattention and required cueing for obstacles on that side when ambulating. Pt ambulated back to room and attempted to abandon [employer] when approaching bed; pt cued for technique/safety. Pt was assisted back to bed. Pt verbalized understanding to call for assistance when she wishes to ambulate to bathroom. Pt was left supine in bed with all needs met at conclusion of session. SW notified of pt's performance. Pts/family goals: To return home. Patient and or family understand(s) diagnosis, prognosis, and plan of care:  Yes. PLAN  PT care will be provided in accordance with the objectives noted above. Whenever appropriate, clear delegation orders will be provided for nursing staff. Exercises and functional mobility practice will be used as well as appropriate assistive devices or modalities to obtain goals. Patient and family education will also be administered as needed. Frequency of treatments: 2-5x/week x 2-3 days.     Time in: 1045  Time out: [address], [place]  [id number]

## 2019-06-10 NOTE — PLAN OF CARE
Problem: Falls - Risk of:  Goal: Will remain free from falls  Description  Will remain free from falls  6/10/2019 0532 by Quintin Paget, RN  Outcome: Met This Shift  6/9/2019 1649 by Rita Andersen RN  Outcome: Met This Shift  Goal: Absence of physical injury  Description  Absence of physical injury  6/10/2019 0532 by Quintin Paget, RN  Outcome: Met This Shift  6/9/2019 1649 by Rita Andersen RN  Outcome: Met This Shift

## 2019-06-10 NOTE — PROGRESS NOTES
Dr Maico Sharma assigned to consult per Karol Cooper in [facility].   Pt added to census

## 2019-06-10 NOTE — CONSULTS
[address]                  [address] [facility], [address] [facility]                                  CONSULTATION    PATIENT NAME: Neto Lewis                      :        1940  MED REC NO:   [id number]                            ROOM:       7191  ACCOUNT NO:   [de-identified]                           ADMIT DATE: 2019  PROVIDER:     Bridger Thomas MD    CONSULT DATE:  06/10/2019    History from the patient and medical records. CHIEF COMPLAINT:  CVA. HISTORY OF PRESENT ILLNESS:  The patient had some rather vague symptoms  at home of being dizzy and unsteady. She was found to have some right  side weakness and also was found to have new onset of atrial  fibrillation. She was admitted as a possible stroke and stabilized. She was started on aspirin and has now also been started on Eliquis  because of the atrial fib. MRI is still pending. The initial CT scan  showed a probable subacute infarct in the left thalamus. She has been  evaluated by Therapy. She is minimal assist for transfers, minimal for  ambulating 75 feet with a wheeled walker, minimal for most of her ADLs. PAST MEDICAL HISTORY:  1. Hypertension. 2.  Osteoarthritis. 3.  Atrial fibrillation, which I believe may be new. ALLERGIES:  ERYTHROMYCIN. CURRENT MEDICATIONS:  Eliquis 5 mg b.i.d., aspirin 81 mg daily, Lipitor  40 mg nightly, Trusopt and Timoptic eye drops, HydroDIURIL 25 mg daily,  Xalatan eyedrops, Prinivil 40 mg daily, Toprol 25 mg b.i.d. HABITS:  No smoking or alcohol. SOCIAL HISTORY:  The patient lives with her daughter, who apparently is  a home health nurse. REVIEW OF SYSTEMS:  HEENT:  Negative for prior HEENT problems. PULMONARY:  Negative for prior pulmonary problems. CARDIAC:  Positive  for the current atrial fib. GI/:  Negative. ORTHOPEDIC:  Negative. NEUROLOGIC:  Positive for the stroke.     PHYSICAL EXAMINATION:  GENERAL:  A

## 2019-06-11 VITALS
TEMPERATURE: 98.6 F | HEART RATE: 78 BPM | HEIGHT: 63 IN | RESPIRATION RATE: 12 BRPM | DIASTOLIC BLOOD PRESSURE: 72 MMHG | WEIGHT: 164 LBS | SYSTOLIC BLOOD PRESSURE: 129 MMHG | BODY MASS INDEX: 29.06 KG/M2 | OXYGEN SATURATION: 98 %

## 2019-06-11 LAB
LV EF: 65 %
LVEF MODALITY: NORMAL

## 2019-06-11 PROCEDURE — 6370000000 HC RX 637 (ALT 250 FOR IP): Performed by: SURGERY

## 2019-06-11 PROCEDURE — 6370000000 HC RX 637 (ALT 250 FOR IP): Performed by: INTERNAL MEDICINE

## 2019-06-11 PROCEDURE — 2580000003 HC RX 258: Performed by: INTERNAL MEDICINE

## 2019-06-11 PROCEDURE — 93306 TTE W/DOPPLER COMPLETE: CPT

## 2019-06-11 PROCEDURE — 99233 SBSQ HOSP IP/OBS HIGH 50: CPT | Performed by: NURSE PRACTITIONER

## 2019-06-11 PROCEDURE — 99232 SBSQ HOSP IP/OBS MODERATE 35: CPT | Performed by: INTERNAL MEDICINE

## 2019-06-11 RX ORDER — ASPIRIN 81 MG/1
81 TABLET, CHEWABLE ORAL DAILY
Qty: 30 TABLET | Refills: 3 | Status: SHIPPED | OUTPATIENT
Start: 2019-06-12 | End: 2019-10-07

## 2019-06-11 RX ORDER — ATORVASTATIN CALCIUM 40 MG/1
40 TABLET, FILM COATED ORAL NIGHTLY
Qty: 30 TABLET | Refills: 3 | Status: SHIPPED | OUTPATIENT
Start: 2019-06-11

## 2019-06-11 RX ORDER — METOPROLOL SUCCINATE 25 MG/1
25 TABLET, EXTENDED RELEASE ORAL 2 TIMES DAILY
Qty: 30 TABLET | Refills: 3 | Status: SHIPPED | OUTPATIENT
Start: 2019-06-11

## 2019-06-11 RX ADMIN — DORZOLAMIDE HYDROCHLORIDE 1 DROP: 20 SOLUTION/ DROPS OPHTHALMIC at 09:17

## 2019-06-11 RX ADMIN — TIMOLOL MALEATE 1 DROP: 5 SOLUTION OPHTHALMIC at 09:17

## 2019-06-11 RX ADMIN — Medication 10 ML: at 09:20

## 2019-06-11 RX ADMIN — ASPIRIN 81 MG 81 MG: 81 TABLET ORAL at 09:20

## 2019-06-11 RX ADMIN — HYDROCHLOROTHIAZIDE 25 MG: 25 TABLET ORAL at 09:17

## 2019-06-11 RX ADMIN — METOPROLOL SUCCINATE 25 MG: 25 TABLET, EXTENDED RELEASE ORAL at 09:17

## 2019-06-11 RX ADMIN — LISINOPRIL 40 MG: 20 TABLET ORAL at 09:17

## 2019-06-11 RX ADMIN — APIXABAN 5 MG: 5 TABLET, FILM COATED ORAL at 09:17

## 2019-06-11 ASSESSMENT — PAIN SCALES - GENERAL
PAINLEVEL_OUTOF10: 0

## 2019-06-11 NOTE — PROGRESS NOTES
Subjective:  61-year-old woman seen on [facility] extension earlier this morning  Admitted with the stroke  MRI confirms left parietal lobe stroke of acute nature  CT angiogram showing left carotid disease  She has no residual deficits at present  Tolerating medications well  On beta blocker statin anticoagulant therapy   Ambulating to bathroom      Objective:    /82   Pulse 104   Temp 98.6 °F (37 °C) (Temporal)   Resp 12   Ht 5' 3\" (1.6 m)   Wt 164 lb (74.4 kg)   SpO2 96%   BMI 29.05 kg/m²   Awake alert oriented  No acute distress  Oral mucosa moist  Neck supple  Heart: Irregular not rapid  Lungs:  CTA bilaterally, no wheeze, rales or rhonchi  Abd: bowel sounds present, nontender, nondistended, no masses  Extrem:  No clubbing, cyanosis, or edema  Neuro intact  Data reviewed    Assessment:    Patient Active Problem List   Diagnosis    Cerebrovascular accident (CVA) ([address]    Persistent atrial fibrillation ([address]    Essential hypertension    Mixed hyperlipidemia    Stenosis of left carotid artery       Plan:  Echo pending  Reviewed in detail with the patient  Discharge home when okay with others      Becka Lara  11:24 AM  6/11/2019

## 2019-06-11 NOTE — PROGRESS NOTES
CLINICAL PHARMACY NOTE: MEDS TO [address] Select Patient?: Yes  Total # of Prescriptions Filled: 4   The following medications were delivered to the patient:  · METOPROLOL SUCCINATE ER 25 MG   · ELIQUIS 5 MG   · ATORVASTATIN 40 MG   · ASPIRIN 81 MG CHEW   Total # of Interventions Completed: 3  Time Spent (min): 30    Additional Documentation:

## 2019-06-11 NOTE — CARE COORDINATION
Met patient at bedside to discuss role & transition of care. Patient admitted for  stroke like symptoms. Per Neuro 6/9 :Suspect smaller stroke causing mild expressive aphasia and right extremities numbness. Started on ASA 81 mg daily. Patient lives with her daughter in ranch style home with three steps to enter. She has a straight cane & shower chair. Reviewed PT/OT evals & encouraged wheeled walker but she declined. No history of Home Healthcare or rehab facilities. PCP is Dr. Brennen Drummond is [facility] in [place]. Her daughter will transport her home upon discharge. Explained benefit of [facility] & patient chose [facility]. Referral made to Chaya Mckoy at [facility]. Will Need Home Healthcare orders for CPA, Med compliance & PT/OT upon discharge. Charge nurse & RN aware. Plan is home w/ [facility].  Milagros Blanchard RN CM

## 2019-06-11 NOTE — PROGRESS NOTES
INPATIENT CARDIOLOGY FOLLOW-UP    Name: Danyell Lizama    Age: [de-identified] y.o. Date of Admission: 6/9/2019  9:07 AM    Date of Service: 6/11/2019    Chief Complaint: Follow-up for atrial fibrillation    Interim History:  +atrial fibrillation of unknown duration on admission, +CVA (right hand weakness and speech difficulties -- speech improved), no CP/SOB/palpitations. Currently with no acute distress. Rate controlled atrial fibrillation on telemetry. No new overnight cardiac complaints. Review of Systems:   Cardiac: As per HPI  General: No fever, chills  Pulmonary: As per HPI  HEENT: No visual disturbances, difficult swallowing  GI: No nausea, vomiting  Endocrine: No thyroid disease or DM  Musculoskeletal: EMMANUEL x 4, +right hand weakness  Skin: Intact, no rashes  Neuro/Psych: No headache or seizures    Problem List:  Patient Active Problem List   Diagnosis    [facility] ([facility]) [facility]    Persistent atrial fibrillation ([facility]    Essential hypertension    Mixed hyperlipidemia    Stenosis of left carotid artery       Allergies:   Allergies   Allergen Reactions    Erythromycin Nausea And Vomiting       Current Medications:  Current Facility-Administered Medications   Medication Dose Route Frequency Provider Last Rate Last Dose    apixaban (ELIQUIS) tablet 5 mg  5 mg Oral BID Sourav Gallego MD   5 mg at 06/10/19 2052    perflutren lipid microspheres (DEFINITY) injection 1.65 mg  1.5 mL Intravenous ONCE PRN Sourav Gallego MD        metoprolol succinate (TOPROL XL) extended release tablet 25 mg  25 mg Oral BID Sourav Gallego MD   25 mg at 06/10/19 2052    atorvastatin (LIPITOR) tablet 40 mg  40 mg Oral Nightly Sourav Gallego MD   40 mg at 06/10/19 2052    sodium chloride flush 0.9 % injection 10 mL  10 mL Intravenous 2 times per day Becka Lara MD   10 mL at 06/10/19 2052    sodium chloride flush 0.9 % injection 10 mL  10 mL Intravenous PRN Becka Lara MD        hydrochlorothiazide (HYDRODIURIL) tablet 25 mg  25 mg Oral Daily Gabby Tesfaye MD   25 mg at 06/10/19 0846    lisinopril (PRINIVIL;ZESTRIL) tablet 40 mg  40 mg Oral Daily Gabby Tesfaye MD   40 mg at 06/10/19 0846    latanoprost (XALATAN) 0.005 % ophthalmic solution 1 drop  1 drop Both Eyes Nightly Gabby Tesfaye MD   1 drop at 06/10/19 2052    dorzolamide (TRUSOPT) 2 % ophthalmic solution 1 drop  1 drop Both Eyes BID Gabby Tesfaye MD   1 drop at 06/10/19 2053    And    timolol (TIMOPTIC) 0.5 % ophthalmic solution 1 drop  1 drop Both Eyes BID Gabby Tesfaye MD   1 drop at 06/10/19 2053    acetaminophen (TYLENOL) tablet 325 mg  325 mg Oral Q4H PRN Gabby Tesfaye MD   325 mg at 06/10/19 1735    aspirin chewable tablet 81 mg  81 mg Oral Daily Amira Rader MD   81 mg at 06/10/19 0846         Physical Exam:  /82   Pulse 104   Temp 98.6 °F (37 °C) (Temporal)   Resp 12   Ht 5' 3\" (1.6 m)   Wt 164 lb (74.4 kg)   SpO2 96%   BMI 29.05 kg/m²   Wt Readings from Last 3 Encounters:   06/09/19 164 lb (74.4 kg)   07/05/16 144 lb (65.3 kg)     Appearance: Awake, alert, no acute respiratory distress  Skin: Intact, no rash  Head: Normocephalic, atraumatic  Eyes: EOMI, no conjunctival erythema  ENMT: MMM, no rhinorrhea  Neck: Supple, no JVD  Lungs: Clear to auscultation bilaterally. No wheezes, rales, or rhonchi. Cardiac: IRRR, no murmurs  Abdomen: Soft, +bowel sounds  Extremities: Moves all extremities x 4, no lower extremity edema  Neurologic: +right hand weakness, normal mood and affect    Intake/Output:    Intake/Output Summary (Last 24 hours) at 6/11/2019 0855  Last data filed at 6/11/2019 0425  Gross per 24 hour   Intake 540 ml   Output --   Net 540 ml     No intake/output data recorded.     Laboratory Tests:  Recent Labs     06/09/19  0950      K 3.8      CO2 22   BUN 25*   CREATININE 1.1*   GLUCOSE 126*   CALCIUM 9.2     No results found for: MG  No results for input(s): ALKPHOS, ALT, AST, PROT, BILITOT,

## 2019-06-11 NOTE — PROGRESS NOTES
Stop by to see the patient. The patient is currently in the bathroom. She told me she had an MRI yesterday. I reviewed the MRI she is had a thalamic and temporal acute stroke via the MRI. We will await Neurology's input. From our standpoint  We will schedule her down the road for an elective carotid endarterectomy secondary to critical carotid artery stenosis. She will remain on risk reduction therapy.

## 2019-06-11 NOTE — PROGRESS NOTES
ophthalmic solution 1 drop  1 drop Both Eyes BID Monica Stallings MD   1 drop at 06/11/19 0917    And    timolol (TIMOPTIC) 0.5 % ophthalmic solution 1 drop  1 drop Both Eyes BID Monica Stallings MD   1 drop at 06/11/19 0917    acetaminophen (TYLENOL) tablet 325 mg  325 mg Oral Q4H PRN Monica Stallings MD   325 mg at 06/10/19 1735    aspirin chewable tablet 81 mg  81 mg Oral Daily Ana Valencia MD   81 mg at 06/11/19 0920     Objective:     /82   Pulse 104   Temp 98.6 °F (37 °C) (Temporal)   Resp 12   Ht 5' 3\" (1.6 m)   Wt 164 lb (74.4 kg)   SpO2 96%   BMI 29.05 kg/m²     General appearance: alert, appears younger than stated age, cooperative and no distress  Head: normocephalic, without obvious abnormality, atraumatic  Eyes: conjunctivae/corneas clear.  .  Lungs: clear to auscultation bilaterally  Heart: afib with irregularly irreg rhythm  Extremities: normal, atraumatic, no cyanosis or edema  Pulses: 2+ and symmetric  Skin: color, texture, turgor normal---no rashes or lesions      Mental Status: alert, oriented x4    Appropriate attention/concentration  Intact fundus of knowledge  Intact memories    Speech: no dysarthria  Language: no aphasias    Cranial Nerves:  I: smell NA   II: visual acuity  NA   II: visual fields Full to confrontation   II: pupils EDITH   III,VII: ptosis None   III,IV,VI: extraocular muscles  Full ROM   V: mastication Normal   V: facial light touch sensation  Normal   V,VII: corneal reflex     VII: facial muscle function - upper  Normal   VII: facial muscle function - lower Normal   VIII: hearing Normal   IX: soft palate elevation  Normal   IX,X: gag reflex    XI: trapezius strength  5/5   XI: sternocleidomastoid strength 5/5   XI: neck extension strength  5/5   XII: tongue strength  Normal     Motor:  5/5 throughout  No drift  Normal bulk and tone  No abnormal movements    Sensory:  LT and PP intact in all limbs    Coordination:   Mild dysmetria with FN testing on [] Excessive alcohol use       [] Use of illicit drugs       [] Personal history of previous TIA or stroke       [] Personal history of heart disease       [] Sleep apnea       [] Family history of stroke or heart disease    Advised patient that you can reduce your risk for stroke/TIA by modifying/controlling the risk factors that you have. Patient advised to take the medications as prescribed, which will be detailed in the discharge instructions, and to not stop taking them without consulting their physician. In addition, pt. advised to maintain a healthy diet, exercise regularly and to not smoke.     Ayaz Mckeon, ANTHONY-CNP  2:44 PM  6/11/2019

## 2019-06-12 NOTE — DISCHARGE SUMMARY
Physician Discharge Summary     Patient ID:  Keara Mckenzie  [id number]  [de-identified] y.o.  1940    Admit date: 6/9/2019    Discharge date and time: 6/11/2019  5:30 PM     Admission Diagnoses: Acute CVA (cerebrovascular accident) [facility]) [I63.9]  Acute CVA (cerebrovascular accident) [facility]) [I63.9]    Discharge Diagnoses:   Acute left thalamic parietal lobe stroke  New onset of atrial fibrillation with controlled ventricular response  Left carotid artery occlusive disease  Comorbidities as listed below present and past  Patient Active Problem List   Diagnosis    Posterior choroidal artery infarction ([address]    Persistent [facility] fibrillation [facility]    Essential hypertension    Mixed hyperlipidemia    Stenosis of left carotid artery       Consults: neurology cardiology vascular surgery    Procedures: CT angiogram of the head and neck showing left carotid artery disease of 80% occlusion  Echo pending  MRI of the brain confirmed the left-sided stroke    Hospital Course:   57-year-old woman with the transient right-sided weakness admitted to neuro floor  CT scan MRI confirmed the presence of right thalamic and parietal lobe stroke  She had no residual deficits  She was also found to be any new onset of atrial fibrillation with controlled ventricular response  Anticoagulant therapy with eliquis was initiated  Statin therapy was initiated  Aspirin therapy continued  She remained stable during the rest of the hospital course  Discharged home in stable condition    Discharge Exam:  See progress note from today    Disposition: stable  Discharged home  Patient Instructions:   Discharge Medication List as of 6/11/2019  4:13 PM      START taking these medications    Details   aspirin 81 MG chewable tablet Take 1 tablet by mouth daily, Disp-30 tablet, R-3Normal      apixaban (ELIQUIS) 5 MG TABS tablet Take 1 tablet by mouth 2 times daily, Disp-60 tablet, R-2Normal      atorvastatin (LIPITOR) 40 MG tablet Take 1 tablet by mouth nightly, Disp-30 tablet, R-3Normal      metoprolol succinate (TOPROL XL) 25 MG extended release tablet Take 1 tablet by mouth 2 times daily, Disp-30 tablet, R-3Normal         CONTINUE these medications which have NOT CHANGED    Details   lisinopril (PRINIVIL;ZESTRIL) 40 MG tablet Take 40 mg by mouth dailyHistorical Med      hydrochlorothiazide (HYDRODIURIL) 25 MG tablet Take 25 mg by mouth dailyHistorical Med      Hyaluronic Acid-Vitamin C (HYALURONIC ACID PO) Take 1 capsule by mouth Chewable capsHistorical Med      vitamin D (CHOLECALCIFEROL) 1000 UNIT TABS tablet Take 1,000 Units by mouth dailyHistorical Med      Probiotic Product (PROBIOTIC DAILY PO) Take 1 tablet by mouthHistorical Med      cyanocobalamin 1000 MCG tablet Take 10,000 mcg by mouth daily Indications: patient otc bottle reads [id number] Historical Med      dorzolamide-timolol (COSOPT) 22.3-6.8 MG/ML ophthalmic solution 1 drop 2 times daily      latanoprost (XALATAN) 0.005 % ophthalmic solution 1 drop nightly         STOP taking these medications       phenazopyridine (PYRIDIUM) 100 MG tablet Comments:   Reason for Stopping:             Activity: as tolerated  Diet: as tolerated    Follow-up with PCP cardiology and vascular surgery  She may need eventually left carotid endarterectomy  Consider DC CV if she remains in atrial fibrillation as outpatient  Note that over 40 minutes was spent in preparing discharge papers, discussing discharge with patient, medication review, etc.    Signed:  Isabella Staples MD  6/12/2019  2:14 PM

## 2019-06-19 ENCOUNTER — TELEPHONE (OUTPATIENT)
Dept: CARDIOLOGY CLINIC | Age: 79
End: 2019-06-19

## 2019-06-19 ENCOUNTER — OFFICE VISIT (OUTPATIENT)
Dept: NEUROLOGY | Age: 79
End: 2019-06-19
Payer: MEDICARE

## 2019-06-19 VITALS
BODY MASS INDEX: 26.93 KG/M2 | RESPIRATION RATE: 17 BRPM | DIASTOLIC BLOOD PRESSURE: 79 MMHG | WEIGHT: 152 LBS | HEIGHT: 63 IN | OXYGEN SATURATION: 96 % | SYSTOLIC BLOOD PRESSURE: 135 MMHG | HEART RATE: 86 BPM

## 2019-06-19 DIAGNOSIS — I48.19 PERSISTENT ATRIAL FIBRILLATION (HCC): ICD-10-CM

## 2019-06-19 DIAGNOSIS — I63.9: ICD-10-CM

## 2019-06-19 DIAGNOSIS — I65.22 STENOSIS OF LEFT CAROTID ARTERY: ICD-10-CM

## 2019-06-19 PROCEDURE — G8419 CALC BMI OUT NRM PARAM NOF/U: HCPCS | Performed by: NURSE PRACTITIONER

## 2019-06-19 PROCEDURE — 1123F ACP DISCUSS/DSCN MKR DOCD: CPT | Performed by: NURSE PRACTITIONER

## 2019-06-19 PROCEDURE — G8598 ASA/ANTIPLAT THER USED: HCPCS | Performed by: NURSE PRACTITIONER

## 2019-06-19 PROCEDURE — 99212 OFFICE O/P EST SF 10 MIN: CPT | Performed by: NURSE PRACTITIONER

## 2019-06-19 PROCEDURE — 99215 OFFICE O/P EST HI 40 MIN: CPT | Performed by: NURSE PRACTITIONER

## 2019-06-19 PROCEDURE — 1111F DSCHRG MED/CURRENT MED MERGE: CPT | Performed by: NURSE PRACTITIONER

## 2019-06-19 PROCEDURE — 1036F TOBACCO NON-USER: CPT | Performed by: NURSE PRACTITIONER

## 2019-06-19 PROCEDURE — G8400 PT W/DXA NO RESULTS DOC: HCPCS | Performed by: NURSE PRACTITIONER

## 2019-06-19 PROCEDURE — 1090F PRES/ABSN URINE INCON ASSESS: CPT | Performed by: NURSE PRACTITIONER

## 2019-06-19 PROCEDURE — 4040F PNEUMOC VAC/ADMIN/RCVD: CPT | Performed by: NURSE PRACTITIONER

## 2019-06-19 PROCEDURE — G8427 DOCREV CUR MEDS BY ELIG CLIN: HCPCS | Performed by: NURSE PRACTITIONER

## 2019-06-19 NOTE — TELEPHONE ENCOUNTER
Mikaela Vanessa requested to speak to Dr Sri Sosa  to schedule a hospital f/u for Altaf Varela.   Call transferred to March , instructed to LM

## 2019-06-19 NOTE — TELEPHONE ENCOUNTER
Jevon Gibbs from [address] [place] [address] called to advise patient needs a hospital follow-up with Dr. Charles Mcdaniel.  Please advise.

## 2019-06-19 NOTE — PROGRESS NOTES
Dayami Scott contacted Dr Rojelio Jacobs office (Cardiology) at [phone] to schedule an appointment for patient to follow up on A-Fib and recent hospital admission for CVA. Dr Catalina Heredia did not answer so GUICHO Singh left message asking her to please call patient and schedule for appointment ASAP. Dayami Scott also contacted Dr Karyle Kid office (Vascular) at [phone] to schedule for stenosis of left carotid artery. MA spoke to Jagdish pyle who states she will speak with DR Guaman to see if patient needs seen in office or if she can just be scheduled for her procedure. Jagdish pyle states she will contact patient once she speaks with the doctor.     Electronically signed by Everett Reese on 6/19/19 at 11:10 AM

## 2019-06-19 NOTE — PROGRESS NOTES
STROKE CLINIC VISIT      Ryan Marmolejo is a [de-identified] y.o. right handed female     History of Present Illness:     Summary of Hospitalization:  She presented on   Her daughter reports she had symptoms the previous day but refused to seek medical attention at that time. The following day when she was unable to walk she was agreeable to calling EMS. Upon arrival to ED, CT head demonstrated subacute infarcts in right thalamus and medial temporal lobe. CTAs showed severe atherosclerotic disease with estimated 70% stenosis of L ICA. Vascular surgery recommend L CEA in future with initiation of ASA and high-intensity statin. Telemetery monitoring revealed new onset atrial fibrillation and she was started on anticoagulation. She was discharged home without any therapy needs. Stroke Risk Factors:  Hypertension  Hyperlipidemia   Atrial fibrillation    Carotid stenosin     Stroke Clinic:  She presents today with her daughter, they are both excellent historians. She reports near complete resolution of symptoms however complains of continued difficulty with short term memory. She is not receiving any therapies at home but [place] [id number] is providing nursing services. She denies previous history of stroke or cardiac issues. She remains on Eliquis without complications for new onset atrial fibrillation. She reports she is to have carotid surgery but is unaware of upcoming appointments. She denies having had cardiac clearance for procedure. She remains on ASA and statin therapy without ill effects. Her daughter reports the patient has been increasingly anxious with concern of having another stroke. She admits to this. She has tried anti-depressants in the past without noted benefits. She wishes to try anti-anxiolytics. Current Functional Status(Modified Hemphill Scale):   2=Slight disability; unable to carry out all previous activities, but able to look after own affairs without assistance    Review of Systems:     No chest pain, palpitations or shortness of breath  No vertigo, lightheadedness or loss of consciousness  No falls, tripping or stumbling  No incontinence of bowels or bladder  No numbness, tingling or focal arm/leg weakness  No swallowing or speech difficulties    ROS otherwise negative    Objective:     /79 (Site: Left Upper Arm, Position: Sitting, Cuff Size: Medium Adult)   Pulse 86   Resp 17   Ht 5' 3\" (1.6 m)   Wt 152 lb (68.9 kg)   SpO2 96%   BMI 26.93 kg/m²     General Appearance: alert, cooperative, no distress, appears stated age    Head: normocephalic, without obvious abnormality, atraumatic    Eyes: conjunctiva/corneas clear    Neck: supple, symmetrical, trachea midline, no carotid bruit    Lungs: clear to auscultation bilaterally, respirations unlabored    Heart: regular rate and rhythm, S1 and S2 normal   Extremities: normal, atraumatic, no cyanosis or edema   Pulses: 2+ and symmetric all extremities   Skin: color, texture and turgor normal, no rashes or lesions     Mental Status: alert and oriented, thought content appropriate  Speech: no dysarthria  Language: no aphasia    Cranial Nerves:  I: smell NA   II: visual acuity  NA   II: visual fields Full    II: pupils Equal and reactive    III,VII: ptosis None   III,IV,VI: extraocular muscles  Full ROM without nystagmus   V: mastication Normal   V: facial light touch sensation  Normal   V,VII: corneal reflex     VII: facial muscle function - upper  Normal   VII: facial muscle function - lower Normal   VIII: hearing Normal   IX: soft palate elevation  Normal   IX,X: gag reflex    XI: trapezius strength  5/5   XI: sternocleidomastoid strength 5/5   XI: neck extension strength  5/5   XII: tongue strength  Normal     Motor:  5/5 throughout  No abnormal movements  No drift   Normal bulk and tone     Sensory:  LT intact throughout, endorses chronic bilateral hand numbness     Coordination:   FN, FFM symmetric    Gait:  Normal with use of single prong cane     DTR: Right Brachioradialis reflex 0  Left Brachioradialis reflex 0  Right Biceps reflex 2+  Left Biceps reflex 2+  Right Quadriceps reflex 1+  Left Quadriceps reflex 1+  Right Achilles reflex 0  Left Achilles reflex 0  No Dugan's  No other pathological reflexes      Laboratory/Radiology:     CBC:   Lab Results   Component Value Date    WBC 5.9 06/09/2019    RBC 4.68 06/09/2019    HGB 13.6 06/09/2019    HCT 40.8 06/09/2019    MCV 87.2 06/09/2019    MCH 29.1 06/09/2019    MCHC 33.3 06/09/2019    RDW 13.7 06/09/2019     06/09/2019    MPV 10.0 06/09/2019     CMP:    Lab Results   Component Value Date     06/09/2019    K 3.8 06/09/2019     06/09/2019    CO2 22 06/09/2019    BUN 25 06/09/2019    CREATININE 1.1 06/09/2019    GFRAA 58 06/09/2019    LABGLOM 48 06/09/2019    GLUCOSE 126 06/09/2019    CALCIUM 9.2 06/09/2019     HgBA1c:    Lab Results   Component Value Date    LABA1C 5.8 06/10/2019     FLP:    Lab Results   Component Value Date    TRIG 153 06/10/2019    HDL 46 06/10/2019    LDLCALC 167 06/10/2019    LABVLDL 31 06/10/2019     * Images and labs personally reviewed at the time of this office visit    Assessment/Plan:     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 She remains on ASA, Eliquis and high-intensity statin for secondary stroke prevention. Left carotid endarterectomy is anticipated- MA contacted cardiology and vascular surgery offices to confirm follow up visits. Follow up with neurology in 3-4 months    Mitchell Hurley MSN, APRN, FNP-C  10:54 AM  6/19/19    I spent 40 minutes with this patient obtaining the HPI, discussing the exam as well as discussing our plan of action. All questions were answered prior to leaving my office.

## 2019-06-21 ENCOUNTER — TELEPHONE (OUTPATIENT)
Dept: CARDIOLOGY CLINIC | Age: 79
End: 2019-06-21

## 2019-07-10 ENCOUNTER — TELEPHONE (OUTPATIENT)
Dept: VASCULAR SURGERY | Age: 79
End: 2019-07-10

## 2019-07-10 ENCOUNTER — TELEPHONE (OUTPATIENT)
Dept: CARDIOLOGY CLINIC | Age: 79
End: 2019-07-10

## 2019-07-10 ENCOUNTER — OFFICE VISIT (OUTPATIENT)
Dept: VASCULAR SURGERY | Age: 79
End: 2019-07-10
Payer: MEDICARE

## 2019-07-10 VITALS — DIASTOLIC BLOOD PRESSURE: 88 MMHG | HEART RATE: 70 BPM | SYSTOLIC BLOOD PRESSURE: 136 MMHG

## 2019-07-10 DIAGNOSIS — I48.91 ATRIAL FIBRILLATION WITH CONTROLLED VENTRICULAR RESPONSE (HCC): Primary | ICD-10-CM

## 2019-07-10 DIAGNOSIS — I65.22 STENOSIS OF LEFT CAROTID ARTERY: Primary | ICD-10-CM

## 2019-07-10 DIAGNOSIS — Z01.810 PREOPERATIVE CARDIOVASCULAR EXAMINATION: ICD-10-CM

## 2019-07-10 PROCEDURE — 1090F PRES/ABSN URINE INCON ASSESS: CPT | Performed by: SURGERY

## 2019-07-10 PROCEDURE — 4040F PNEUMOC VAC/ADMIN/RCVD: CPT | Performed by: SURGERY

## 2019-07-10 PROCEDURE — G8598 ASA/ANTIPLAT THER USED: HCPCS | Performed by: SURGERY

## 2019-07-10 PROCEDURE — 1111F DSCHRG MED/CURRENT MED MERGE: CPT | Performed by: SURGERY

## 2019-07-10 PROCEDURE — 99213 OFFICE O/P EST LOW 20 MIN: CPT | Performed by: SURGERY

## 2019-07-10 PROCEDURE — G8427 DOCREV CUR MEDS BY ELIG CLIN: HCPCS | Performed by: SURGERY

## 2019-07-10 PROCEDURE — G8400 PT W/DXA NO RESULTS DOC: HCPCS | Performed by: SURGERY

## 2019-07-10 PROCEDURE — G8419 CALC BMI OUT NRM PARAM NOF/U: HCPCS | Performed by: SURGERY

## 2019-07-10 PROCEDURE — 1036F TOBACCO NON-USER: CPT | Performed by: SURGERY

## 2019-07-10 PROCEDURE — 1123F ACP DISCUSS/DSCN MKR DOCD: CPT | Performed by: SURGERY

## 2019-07-10 RX ORDER — BUSPIRONE HYDROCHLORIDE 5 MG/1
5 TABLET ORAL 2 TIMES DAILY
Refills: 0 | COMMUNITY
Start: 2019-06-19 | End: 2019-07-13 | Stop reason: SDDI

## 2019-07-10 RX ORDER — ACETAMINOPHEN 500 MG
500 TABLET ORAL EVERY 6 HOURS PRN
COMMUNITY
End: 2019-07-13

## 2019-07-12 ENCOUNTER — HOSPITAL ENCOUNTER (EMERGENCY)
Age: 79
Discharge: HOME | End: 2019-07-12
Attending: EMERGENCY MEDICINE
Payer: MEDICARE

## 2019-07-12 ENCOUNTER — APPOINTMENT (OUTPATIENT)
Dept: GENERAL RADIOLOGY | Age: 79
End: 2019-07-12
Payer: MEDICARE

## 2019-07-12 VITALS
TEMPERATURE: 97.4 F | RESPIRATION RATE: 18 BRPM | DIASTOLIC BLOOD PRESSURE: 100 MMHG | WEIGHT: 152 LBS | HEART RATE: 91 BPM | OXYGEN SATURATION: 93 % | SYSTOLIC BLOOD PRESSURE: 157 MMHG | HEIGHT: 63 IN | BODY MASS INDEX: 26.93 KG/M2

## 2019-07-12 DIAGNOSIS — N30.00 ACUTE CYSTITIS WITHOUT HEMATURIA: Primary | ICD-10-CM

## 2019-07-12 DIAGNOSIS — R11.0 NAUSEA: ICD-10-CM

## 2019-07-12 LAB
ALBUMIN SERPL-MCNC: 4.4 G/DL (ref 3.5–5.2)
ALP BLD-CCNC: 78 U/L (ref 35–104)
ALT SERPL-CCNC: 20 U/L (ref 0–32)
ANION GAP SERPL CALCULATED.3IONS-SCNC: 14 MMOL/L (ref 7–16)
AST SERPL-CCNC: 20 U/L (ref 0–31)
BACTERIA: ABNORMAL /HPF
BASOPHILS ABSOLUTE: 0.06 E9/L (ref 0–0.2)
BASOPHILS RELATIVE PERCENT: 0.8 % (ref 0–2)
BILIRUB SERPL-MCNC: 0.8 MG/DL (ref 0–1.2)
BILIRUBIN URINE: NEGATIVE
BLOOD, URINE: NEGATIVE
BUN BLDV-MCNC: 14 MG/DL (ref 8–23)
CALCIUM SERPL-MCNC: 10 MG/DL (ref 8.6–10.2)
CHLORIDE BLD-SCNC: 104 MMOL/L (ref 98–107)
CLARITY: CLEAR
CO2: 23 MMOL/L (ref 22–29)
COLOR: YELLOW
CREAT SERPL-MCNC: 1 MG/DL (ref 0.5–1)
EOSINOPHILS ABSOLUTE: 0.16 E9/L (ref 0.05–0.5)
EOSINOPHILS RELATIVE PERCENT: 2.1 % (ref 0–6)
GFR AFRICAN AMERICAN: >60
GFR NON-AFRICAN AMERICAN: 53 ML/MIN/1.73
GLUCOSE BLD-MCNC: 121 MG/DL (ref 74–99)
GLUCOSE URINE: NEGATIVE MG/DL
HCT VFR BLD CALC: 40.4 % (ref 34–48)
HEMOGLOBIN: 13.3 G/DL (ref 11.5–15.5)
IMMATURE GRANULOCYTES #: 0.03 E9/L
IMMATURE GRANULOCYTES %: 0.4 % (ref 0–5)
KETONES, URINE: NEGATIVE MG/DL
LEUKOCYTE ESTERASE, URINE: ABNORMAL
LYMPHOCYTES ABSOLUTE: 1.02 E9/L (ref 1.5–4)
LYMPHOCYTES RELATIVE PERCENT: 13.5 % (ref 20–42)
MAGNESIUM: 2.3 MG/DL (ref 1.6–2.6)
MCH RBC QN AUTO: 29.3 PG (ref 26–35)
MCHC RBC AUTO-ENTMCNC: 32.9 % (ref 32–34.5)
MCV RBC AUTO: 89 FL (ref 80–99.9)
MONOCYTES ABSOLUTE: 0.68 E9/L (ref 0.1–0.95)
MONOCYTES RELATIVE PERCENT: 9 % (ref 2–12)
NEUTROPHILS ABSOLUTE: 5.62 E9/L (ref 1.8–7.3)
NEUTROPHILS RELATIVE PERCENT: 74.2 % (ref 43–80)
NITRITE, URINE: NEGATIVE
PDW BLD-RTO: 14.1 FL (ref 11.5–15)
PH UA: 6 (ref 5–9)
PLATELET # BLD: 229 E9/L (ref 130–450)
PMV BLD AUTO: 9.6 FL (ref 7–12)
POTASSIUM SERPL-SCNC: 3.9 MMOL/L (ref 3.5–5)
PROTEIN UA: NEGATIVE MG/DL
RBC # BLD: 4.54 E12/L (ref 3.5–5.5)
RBC UA: ABNORMAL /HPF (ref 0–2)
SODIUM BLD-SCNC: 141 MMOL/L (ref 132–146)
SPECIFIC GRAVITY UA: 1.02 (ref 1–1.03)
TOTAL PROTEIN: 7.7 G/DL (ref 6.4–8.3)
TROPONIN: <0.01 NG/ML (ref 0–0.03)
UROBILINOGEN, URINE: 0.2 E.U./DL
WBC # BLD: 7.6 E9/L (ref 4.5–11.5)
WBC UA: ABNORMAL /HPF (ref 0–5)

## 2019-07-12 PROCEDURE — 84484 ASSAY OF TROPONIN QUANT: CPT

## 2019-07-12 PROCEDURE — 83735 ASSAY OF MAGNESIUM: CPT

## 2019-07-12 PROCEDURE — 85025 COMPLETE CBC W/AUTO DIFF WBC: CPT

## 2019-07-12 PROCEDURE — 71046 X-RAY EXAM CHEST 2 VIEWS: CPT

## 2019-07-12 PROCEDURE — 6370000000 HC RX 637 (ALT 250 FOR IP): Performed by: PREVENTIVE MEDICINE

## 2019-07-12 PROCEDURE — 99284 EMERGENCY DEPT VISIT MOD MDM: CPT

## 2019-07-12 PROCEDURE — 81001 URINALYSIS AUTO W/SCOPE: CPT

## 2019-07-12 PROCEDURE — 36415 COLL VENOUS BLD VENIPUNCTURE: CPT

## 2019-07-12 PROCEDURE — 93005 ELECTROCARDIOGRAM TRACING: CPT | Performed by: NURSE PRACTITIONER

## 2019-07-12 PROCEDURE — 80053 COMPREHEN METABOLIC PANEL: CPT

## 2019-07-12 RX ORDER — CEFDINIR 300 MG/1
300 CAPSULE ORAL 2 TIMES DAILY
Qty: 20 CAPSULE | Refills: 0 | Status: SHIPPED | OUTPATIENT
Start: 2019-07-12 | End: 2019-07-22

## 2019-07-12 RX ORDER — ONDANSETRON 4 MG/1
4 TABLET, ORALLY DISINTEGRATING ORAL ONCE
Status: COMPLETED | OUTPATIENT
Start: 2019-07-12 | End: 2019-07-12

## 2019-07-12 RX ORDER — FAMOTIDINE 20 MG/1
20 TABLET, FILM COATED ORAL ONCE
Status: COMPLETED | OUTPATIENT
Start: 2019-07-12 | End: 2019-07-12

## 2019-07-12 RX ADMIN — ONDANSETRON 4 MG: 4 TABLET, ORALLY DISINTEGRATING ORAL at 20:24

## 2019-07-12 RX ADMIN — FAMOTIDINE 20 MG: 20 TABLET ORAL at 20:23

## 2019-07-12 ASSESSMENT — PAIN SCALES - GENERAL: PAINLEVEL_OUTOF10: 5

## 2019-07-12 NOTE — TELEPHONE ENCOUNTER
Patient's daughter Sandy Huerta) and Megha Mcmillan with Dr. Jaqueline Johnston office notified of Dr. Ely Ceja recommendation.

## 2019-07-13 ENCOUNTER — HOSPITAL ENCOUNTER (EMERGENCY)
Age: 79
Discharge: HOME | End: 2019-07-13
Attending: EMERGENCY MEDICINE
Payer: MEDICARE

## 2019-07-13 VITALS
RESPIRATION RATE: 16 BRPM | HEART RATE: 88 BPM | SYSTOLIC BLOOD PRESSURE: 152 MMHG | BODY MASS INDEX: 30.43 KG/M2 | WEIGHT: 155 LBS | DIASTOLIC BLOOD PRESSURE: 84 MMHG | OXYGEN SATURATION: 97 % | HEIGHT: 60 IN | TEMPERATURE: 98.5 F

## 2019-07-13 DIAGNOSIS — R11.0 NAUSEA: Primary | ICD-10-CM

## 2019-07-13 LAB
EKG ATRIAL RATE: 111 BPM
EKG Q-T INTERVAL: 362 MS
EKG QRS DURATION: 70 MS
EKG QTC CALCULATION (BAZETT): 447 MS
EKG R AXIS: 104 DEGREES
EKG T AXIS: -35 DEGREES
EKG VENTRICULAR RATE: 92 BPM

## 2019-07-13 PROCEDURE — 93010 ELECTROCARDIOGRAM REPORT: CPT | Performed by: INTERNAL MEDICINE

## 2019-07-13 PROCEDURE — 96372 THER/PROPH/DIAG INJ SC/IM: CPT

## 2019-07-13 PROCEDURE — 99283 EMERGENCY DEPT VISIT LOW MDM: CPT

## 2019-07-13 PROCEDURE — 6360000002 HC RX W HCPCS: Performed by: EMERGENCY MEDICINE

## 2019-07-13 RX ORDER — PROMETHAZINE HYDROCHLORIDE 25 MG/ML
25 INJECTION, SOLUTION INTRAMUSCULAR; INTRAVENOUS ONCE
Status: COMPLETED | OUTPATIENT
Start: 2019-07-13 | End: 2019-07-13

## 2019-07-13 RX ORDER — PROMETHAZINE HYDROCHLORIDE 25 MG/1
25 TABLET ORAL EVERY 4 HOURS
Qty: 15 TABLET | Refills: 0 | Status: SHIPPED | OUTPATIENT
Start: 2019-07-13 | End: 2019-07-16

## 2019-07-13 RX ADMIN — PROMETHAZINE HYDROCHLORIDE 25 MG: 25 INJECTION INTRAMUSCULAR; INTRAVENOUS at 19:57

## 2019-07-13 ASSESSMENT — PAIN DESCRIPTION - FREQUENCY: FREQUENCY: CONTINUOUS

## 2019-07-13 ASSESSMENT — PAIN DESCRIPTION - LOCATION: LOCATION: ABDOMEN

## 2019-07-13 ASSESSMENT — PAIN DESCRIPTION - DESCRIPTORS: DESCRIPTORS: PATIENT UNABLE TO DESCRIBE

## 2019-07-13 ASSESSMENT — PAIN SCALES - GENERAL: PAINLEVEL_OUTOF10: 7

## 2019-07-15 ENCOUNTER — TELEPHONE (OUTPATIENT)
Dept: CARDIOLOGY | Age: 79
End: 2019-07-15

## 2019-07-17 NOTE — PROGRESS NOTES
cough, chest tightness, shortness of breath and wheezing. Cardiovascular: Negative for chest pain, palpitations and leg swelling. Gastrointestinal: Negative for abdominal distention, abdominal pain, blood in stool, constipation, diarrhea, nausea and vomiting. Endocrine: Negative for cold intolerance, heat intolerance, polydipsia, polyphagia and polyuria. Genitourinary: Negative for decreased urine volume, difficulty urinating, dysuria, frequency, hematuria and urgency. Musculoskeletal: Negative for arthralgias, back pain, gait problem, joint swelling and neck pain. Skin: Negative for color change, pallor, rash and wound. Allergic/Immunologic: Negative for environmental allergies, food allergies and immunocompromised state. Neurological: Negative for dizziness, syncope, facial asymmetry, speech difficulty, weakness, light-headedness, numbness and headaches. Hematological: Negative for adenopathy. Does not bruise/bleed easily. Psychiatric/Behavioral: Negative for agitation, confusion, sleep disturbance and suicidal ideas. The patient is not nervous/anxious. Neuro: History of right-sided weakness.   Currently resolved          PHYSICAL EXAM:  Vitals:    07/10/19 1324   BP: 136/88   Pulse: 70     General Appearance: alert and oriented to person, place and time, well developed and well- nourished, in no acute distress  Skin: warm and dry, no rash or erythema  Head: normocephalic and atraumatic  Eyes: extraocular eye movements intact, conjunctivae normal  ENT: external ear and ear canal normal bilaterally, nose without deformity  Pulmonary/Chest: clear to auscultation bilaterally- no wheezes, rales or rhonchi, normal air movement, no respiratory distress  Cardiovascular: normal rate, regular rhythm, normal S1 and S2, no murmurs, no carotid bruits  Abdomen: soft, non-tender, non-distended, normal bowel sounds, no masses or organomegaly  Musculoskeletal: normal range of motion, no joint swelling,

## 2019-07-18 ENCOUNTER — HOSPITAL ENCOUNTER (OUTPATIENT)
Dept: CARDIOLOGY | Age: 79
Discharge: HOME | End: 2019-07-18
Payer: MEDICARE

## 2019-07-18 VITALS
HEART RATE: 107 BPM | SYSTOLIC BLOOD PRESSURE: 128 MMHG | BODY MASS INDEX: 27.46 KG/M2 | HEIGHT: 63 IN | DIASTOLIC BLOOD PRESSURE: 76 MMHG | WEIGHT: 155 LBS

## 2019-07-18 DIAGNOSIS — I48.91 ATRIAL FIBRILLATION WITH CONTROLLED VENTRICULAR RESPONSE (HCC): ICD-10-CM

## 2019-07-18 DIAGNOSIS — Z01.810 PREOPERATIVE CARDIOVASCULAR EXAMINATION: ICD-10-CM

## 2019-07-18 PROCEDURE — 2580000003 HC RX 258: Performed by: INTERNAL MEDICINE

## 2019-07-18 PROCEDURE — 78452 HT MUSCLE IMAGE SPECT MULT: CPT

## 2019-07-18 PROCEDURE — 6360000002 HC RX W HCPCS: Performed by: INTERNAL MEDICINE

## 2019-07-18 PROCEDURE — 93017 CV STRESS TEST TRACING ONLY: CPT

## 2019-07-18 PROCEDURE — A9500 TC99M SESTAMIBI: HCPCS | Performed by: INTERNAL MEDICINE

## 2019-07-18 PROCEDURE — 3430000000 HC RX DIAGNOSTIC RADIOPHARMACEUTICAL: Performed by: INTERNAL MEDICINE

## 2019-07-18 RX ORDER — SODIUM CHLORIDE 0.9 % (FLUSH) 0.9 %
10 SYRINGE (ML) INJECTION PRN
Status: DISCONTINUED | OUTPATIENT
Start: 2019-07-18 | End: 2019-07-19 | Stop reason: HOSPADM

## 2019-07-18 RX ADMIN — Medication 10 ML: at 11:29

## 2019-07-18 RX ADMIN — Medication 10 ML: at 11:28

## 2019-07-18 RX ADMIN — Medication 10.4 MILLICURIE: at 09:43

## 2019-07-18 RX ADMIN — Medication 10 ML: at 09:43

## 2019-07-18 RX ADMIN — REGADENOSON 0.4 MG: 0.08 INJECTION, SOLUTION INTRAVENOUS at 11:28

## 2019-07-18 RX ADMIN — Medication 33 MILLICURIE: at 11:28

## 2019-07-18 ASSESSMENT — ENCOUNTER SYMPTOMS
CHEST TIGHTNESS: 0
VOMITING: 0
NAUSEA: 1
DIARRHEA: 0
ALLERGIC/IMMUNOLOGIC NEGATIVE: 1
ABDOMINAL PAIN: 0
CONSTIPATION: 1
COUGH: 0
SHORTNESS OF BREATH: 0

## 2019-07-18 NOTE — ED PROVIDER NOTES
HPI:  7/18/19,   Time: 3:41 PM         Alo Spears is a [de-identified] y.o. female presenting to the ED for abdominal pain and nausea, beginning more than 1 day ago. The complaint has been persistent, moderate in severity, and worsened by nothing. ROS:   Pertinent positives and negatives are stated within HPI, all other systems reviewed and are negative.  --------------------------------------------- PAST HISTORY ---------------------------------------------  Past Medical History:  has a past medical history of Arthritis, Atrial fibrillation ([address] Carotid stenosis, right, Cerebral artery occlusion with cerebral infarction ([address] Hx of blood clots, Hyperlipidemia, Hypertension, and UTI (lower urinary tract infection). Past Surgical History:  has a past surgical history that includes Appendectomy and eye surgery (Bilateral). Social History:  reports that she quit smoking about 33 years ago. She has never used smokeless tobacco. She reports that she does not drink alcohol or use drugs. Family History: family history includes Other in her father; Other (age of onset: 80) in her mother. The patients home medications have been reviewed. Allergies: Erythromycin    -------------------------------------------------- RESULTS -------------------------------------------------  All laboratory and radiology results have been personally reviewed by myself   LABS:  No results found for this visit on 07/13/19. RADIOLOGY:  Interpreted by Radiologist.  No orders to display       ------------------------- NURSING NOTES AND VITALS REVIEWED ---------------------------   The nursing notes within the ED encounter and vital signs as below have been reviewed.    BP (!) 152/84   Pulse 88   Temp 98.5 °F (36.9 °C) (Oral)   Resp 16   Ht 5' (1.524 m)   Wt 155 lb (70.3 kg)   SpO2 97%   BMI 30.27 kg/m²   Oxygen Saturation Interpretation: Normal      ---------------------------------------------------PHYSICAL

## 2019-07-19 ENCOUNTER — TELEPHONE (OUTPATIENT)
Dept: CARDIOLOGY CLINIC | Age: 79
End: 2019-07-19

## 2019-07-22 ENCOUNTER — TELEPHONE (OUTPATIENT)
Dept: CARDIOLOGY CLINIC | Age: 79
End: 2019-07-22

## 2019-07-22 NOTE — TELEPHONE ENCOUNTER
Patient needs cardiac clearance for carotid surgery. Stress test negative with normal EF on 7/18/19. Please advise if you would be willing to risk assess patient or if it will need to wait until Dr. Clifton Mckenzie returns.

## 2019-07-24 ENCOUNTER — TELEPHONE (OUTPATIENT)
Dept: VASCULAR SURGERY | Age: 79
End: 2019-07-24

## 2019-08-09 ENCOUNTER — HOSPITAL ENCOUNTER (EMERGENCY)
Age: 79
Discharge: HOME | End: 2019-08-09
Payer: MEDICARE

## 2019-08-09 VITALS
HEART RATE: 85 BPM | SYSTOLIC BLOOD PRESSURE: 162 MMHG | DIASTOLIC BLOOD PRESSURE: 104 MMHG | RESPIRATION RATE: 16 BRPM | OXYGEN SATURATION: 98 % | HEIGHT: 62 IN | WEIGHT: 156 LBS | BODY MASS INDEX: 28.71 KG/M2 | TEMPERATURE: 98.5 F

## 2019-08-09 DIAGNOSIS — R04.0 LEFT-SIDED EPISTAXIS: Primary | ICD-10-CM

## 2019-08-09 DIAGNOSIS — R03.0 ELEVATED BLOOD PRESSURE READING: ICD-10-CM

## 2019-08-09 LAB
ALBUMIN SERPL-MCNC: 4 G/DL (ref 3.5–5.2)
ALP BLD-CCNC: 89 U/L (ref 35–104)
ALT SERPL-CCNC: 10 U/L (ref 0–32)
ANION GAP SERPL CALCULATED.3IONS-SCNC: 12 MMOL/L (ref 7–16)
APTT: 35.1 SEC (ref 25.7–34.7)
AST SERPL-CCNC: 18 U/L (ref 0–31)
BASOPHILS ABSOLUTE: 0.04 E9/L (ref 0–0.2)
BASOPHILS RELATIVE PERCENT: 0.7 % (ref 0–2)
BILIRUB SERPL-MCNC: 0.7 MG/DL (ref 0–1.2)
BUN BLDV-MCNC: 20 MG/DL (ref 8–23)
CALCIUM SERPL-MCNC: 9 MG/DL (ref 8.6–10.2)
CHLORIDE BLD-SCNC: 106 MMOL/L (ref 98–107)
CO2: 23 MMOL/L (ref 22–29)
CREAT SERPL-MCNC: 0.9 MG/DL (ref 0.5–1)
EOSINOPHILS ABSOLUTE: 0.2 E9/L (ref 0.05–0.5)
EOSINOPHILS RELATIVE PERCENT: 3.6 % (ref 0–6)
GFR AFRICAN AMERICAN: >60
GFR NON-AFRICAN AMERICAN: >60 ML/MIN/1.73
GLUCOSE BLD-MCNC: 109 MG/DL (ref 74–99)
HCT VFR BLD CALC: 40.3 % (ref 34–48)
HEMOGLOBIN: 12.9 G/DL (ref 11.5–15.5)
IMMATURE GRANULOCYTES #: 0.01 E9/L
IMMATURE GRANULOCYTES %: 0.2 % (ref 0–5)
INR BLD: 1.8
LYMPHOCYTES ABSOLUTE: 0.99 E9/L (ref 1.5–4)
LYMPHOCYTES RELATIVE PERCENT: 17.6 % (ref 20–42)
MAGNESIUM: 1.9 MG/DL (ref 1.6–2.6)
MCH RBC QN AUTO: 29.1 PG (ref 26–35)
MCHC RBC AUTO-ENTMCNC: 32 % (ref 32–34.5)
MCV RBC AUTO: 90.8 FL (ref 80–99.9)
MONOCYTES ABSOLUTE: 0.44 E9/L (ref 0.1–0.95)
MONOCYTES RELATIVE PERCENT: 7.8 % (ref 2–12)
NEUTROPHILS ABSOLUTE: 3.94 E9/L (ref 1.8–7.3)
NEUTROPHILS RELATIVE PERCENT: 70.1 % (ref 43–80)
PDW BLD-RTO: 14.3 FL (ref 11.5–15)
PLATELET # BLD: 183 E9/L (ref 130–450)
PMV BLD AUTO: 9.6 FL (ref 7–12)
POTASSIUM REFLEX MAGNESIUM: 3.4 MMOL/L (ref 3.5–5)
PROTHROMBIN TIME: 20.4 SEC (ref 9.3–12.4)
RBC # BLD: 4.44 E12/L (ref 3.5–5.5)
SODIUM BLD-SCNC: 141 MMOL/L (ref 132–146)
TOTAL PROTEIN: 7.1 G/DL (ref 6.4–8.3)
WBC # BLD: 5.6 E9/L (ref 4.5–11.5)

## 2019-08-09 PROCEDURE — 30903 CONTROL OF NOSEBLEED: CPT

## 2019-08-09 PROCEDURE — 85610 PROTHROMBIN TIME: CPT

## 2019-08-09 PROCEDURE — 6370000000 HC RX 637 (ALT 250 FOR IP): Performed by: NURSE PRACTITIONER

## 2019-08-09 PROCEDURE — 36415 COLL VENOUS BLD VENIPUNCTURE: CPT

## 2019-08-09 PROCEDURE — 83735 ASSAY OF MAGNESIUM: CPT

## 2019-08-09 PROCEDURE — 85025 COMPLETE CBC W/AUTO DIFF WBC: CPT

## 2019-08-09 PROCEDURE — 30905 CONTROL OF NOSEBLEED: CPT

## 2019-08-09 PROCEDURE — 80053 COMPREHEN METABOLIC PANEL: CPT

## 2019-08-09 PROCEDURE — 85730 THROMBOPLASTIN TIME PARTIAL: CPT

## 2019-08-09 PROCEDURE — 99283 EMERGENCY DEPT VISIT LOW MDM: CPT

## 2019-08-09 RX ADMIN — PHENYLEPHRINE HYDROCHLORIDE 1 SPRAY: 0.25 SPRAY NASAL at 15:55

## 2019-08-09 NOTE — ED PROVIDER NOTES
12.0 fL    Neutrophils % 70.1 43.0 - 80.0 %    Immature Granulocytes % 0.2 0.0 - 5.0 %    Lymphocytes % 17.6 (L) 20.0 - 42.0 %    Monocytes % 7.8 2.0 - 12.0 %    Eosinophils % 3.6 0.0 - 6.0 %    Basophils % 0.7 0.0 - 2.0 %    Neutrophils # 3.94 1.80 - 7.30 E9/L    Immature Granulocytes # 0.01 E9/L    Lymphocytes # 0.99 (L) 1.50 - 4.00 E9/L    Monocytes # 0.44 0.10 - 0.95 E9/L    Eosinophils # 0.20 0.05 - 0.50 E9/L    Basophils # 0.04 0.00 - 0.20 E9/L   Comprehensive Metabolic Panel w/ Reflex to MG   Result Value Ref Range    Sodium 141 132 - 146 mmol/L    Potassium reflex Magnesium 3.4 (L) 3.5 - 5.0 mmol/L    Chloride 106 98 - 107 mmol/L    CO2 23 22 - 29 mmol/L    Anion Gap 12 7 - 16 mmol/L    Glucose 109 (H) 74 - 99 mg/dL    BUN 20 8 - 23 mg/dL    CREATININE 0.9 0.5 - 1.0 mg/dL    GFR Non-African American >60 >=60 mL/min/1.73    GFR African American >60     Calcium 9.0 8.6 - 10.2 mg/dL    Total Protein 7.1 6.4 - 8.3 g/dL    Alb 4.0 3.5 - 5.2 g/dL    Total Bilirubin 0.7 0.0 - 1.2 mg/dL    Alkaline Phosphatase 89 35 - 104 U/L    ALT 10 0 - 32 U/L    AST 18 0 - 31 U/L   APTT   Result Value Ref Range    aPTT 35.1 (H) 25.7 - 34.7 sec   Protime-INR   Result Value Ref Range    Protime 20.4 (H) 9.3 - 12.4 sec    INR 1.8    Magnesium   Result Value Ref Range    Magnesium 1.9 1.6 - 2.6 mg/dL       RADIOLOGY:  Interpreted by Radiologist.  No orders to display       ------------------------- NURSING NOTES AND VITALS REVIEWED ---------------------------   The nursing notes within the ED encounter and vital signs as below have been reviewed.    BP (!) 162/104   Pulse 85   Temp 98.5 °F (36.9 °C) (Oral)   Resp 16   Ht 5' 2\" (1.575 m)   Wt 156 lb (70.8 kg)   SpO2 98%   BMI 28.53 kg/m²   Oxygen Saturation Interpretation: Normal      ---------------------------------------------------PHYSICAL EXAM--------------------------------------      Constitutional/General: Alert and oriented x3, well appearing, non toxic in NAD  Head: tolerated the procedure well. ENT Consultation:  No.         Medical Decision Making:    Patient in no acute distress, start with ying packing     Counseling: The emergency provider has spoken with the patient and discussed todays results, in addition to providing specific details for the plan of care and counseling regarding the diagnosis and prognosis. Questions are answered at this time and they are agreeable with the plan.      --------------------------------- IMPRESSION AND DISPOSITION ---------------------------------    IMPRESSION  1. Left-sided epistaxis    2.  Elevated blood pressure reading        DISPOSITION  Disposition: Discharge to home  Patient condition is good               Eliza Grove, ANTHONY - MISSAEL  08/09/19 1885

## 2019-08-11 ENCOUNTER — HOSPITAL ENCOUNTER (EMERGENCY)
Age: 79
Discharge: HOME | End: 2019-08-11
Attending: EMERGENCY MEDICINE
Payer: MEDICARE

## 2019-08-11 VITALS
HEART RATE: 80 BPM | RESPIRATION RATE: 16 BRPM | DIASTOLIC BLOOD PRESSURE: 78 MMHG | SYSTOLIC BLOOD PRESSURE: 164 MMHG | TEMPERATURE: 98.2 F | OXYGEN SATURATION: 95 %

## 2019-08-11 DIAGNOSIS — Z48.00 ENCOUNTER FOR REMOVAL OF NASAL PACKING: Primary | ICD-10-CM

## 2019-08-11 PROCEDURE — 99282 EMERGENCY DEPT VISIT SF MDM: CPT

## 2019-08-15 ENCOUNTER — HOSPITAL ENCOUNTER (OUTPATIENT)
Dept: PREADMISSION TESTING | Age: 79
Discharge: HOME | End: 2019-08-15
Payer: MEDICARE

## 2019-08-15 ENCOUNTER — ANESTHESIA EVENT (OUTPATIENT)
Dept: OPERATING ROOM | Age: 79
DRG: 026 | End: 2019-08-15
Payer: MEDICARE

## 2019-08-15 VITALS
DIASTOLIC BLOOD PRESSURE: 93 MMHG | SYSTOLIC BLOOD PRESSURE: 183 MMHG | RESPIRATION RATE: 12 BRPM | OXYGEN SATURATION: 95 % | HEIGHT: 62 IN | HEART RATE: 85 BPM | WEIGHT: 156 LBS | BODY MASS INDEX: 28.71 KG/M2 | TEMPERATURE: 98 F

## 2019-08-15 DIAGNOSIS — Z01.812 PRE-OPERATIVE LABORATORY EXAMINATION: Primary | ICD-10-CM

## 2019-08-15 LAB
ABO/RH: NORMAL
ALBUMIN SERPL-MCNC: 4 G/DL (ref 3.5–5.2)
ALP BLD-CCNC: 97 U/L (ref 35–104)
ALT SERPL-CCNC: 14 U/L (ref 0–32)
ANION GAP SERPL CALCULATED.3IONS-SCNC: 13 MMOL/L (ref 7–16)
ANTIBODY SCREEN: NORMAL
AST SERPL-CCNC: 16 U/L (ref 0–31)
BILIRUB SERPL-MCNC: 1 MG/DL (ref 0–1.2)
BUN BLDV-MCNC: 14 MG/DL (ref 8–23)
CALCIUM SERPL-MCNC: 8.9 MG/DL (ref 8.6–10.2)
CHLORIDE BLD-SCNC: 105 MMOL/L (ref 98–107)
CO2: 24 MMOL/L (ref 22–29)
CREAT SERPL-MCNC: 1 MG/DL (ref 0.5–1)
GFR AFRICAN AMERICAN: >60
GFR NON-AFRICAN AMERICAN: 53 ML/MIN/1.73
GLUCOSE BLD-MCNC: 104 MG/DL (ref 74–99)
POTASSIUM REFLEX MAGNESIUM: 4.3 MMOL/L (ref 3.5–5)
SODIUM BLD-SCNC: 142 MMOL/L (ref 132–146)
TOTAL PROTEIN: 7.1 G/DL (ref 6.4–8.3)

## 2019-08-15 PROCEDURE — 87081 CULTURE SCREEN ONLY: CPT

## 2019-08-15 PROCEDURE — 86850 RBC ANTIBODY SCREEN: CPT

## 2019-08-15 PROCEDURE — 80053 COMPREHEN METABOLIC PANEL: CPT

## 2019-08-15 PROCEDURE — 86900 BLOOD TYPING SEROLOGIC ABO: CPT

## 2019-08-15 PROCEDURE — 86901 BLOOD TYPING SEROLOGIC RH(D): CPT

## 2019-08-15 PROCEDURE — 36415 COLL VENOUS BLD VENIPUNCTURE: CPT

## 2019-08-15 RX ORDER — PROMETHAZINE HYDROCHLORIDE 50 MG/ML
50 INJECTION, SOLUTION INTRAMUSCULAR; INTRAVENOUS EVERY 6 HOURS PRN
COMMUNITY
End: 2022-09-07 | Stop reason: ALTCHOICE

## 2019-08-15 NOTE — ANESTHESIA PRE PROCEDURE
Department of Anesthesiology  Preprocedure Note       Name:  Kristopher Winter   Age:  [de-identified] y.o.  :  1940                                          MRN:  [id number]         Date:  2019      Surgeon: Frantz Castellano):  Fredis Juan MD    Procedure: LEFT CAROTID ENDARTERECTOMY (Left )    Medications prior to admission:   Prior to Admission medications    Medication Sig Start Date End Date Taking? Authorizing Provider   aspirin 81 MG chewable tablet Take 1 tablet by mouth daily 19  Yes Sherry Rutledge MD   atorvastatin (LIPITOR) 40 MG tablet Take 1 tablet by mouth nightly 19  Yes Sherry Rutledge MD   metoprolol succinate (TOPROL XL) 25 MG extended release tablet Take 1 tablet by mouth 2 times daily 19  Yes Sherry Rutledge MD   Probiotic Product (PROBIOTIC DAILY PO) Take 1 tablet by mouth   Yes Historical Provider, MD   dorzolamide-timolol (COSOPT) 22.3-6.8 MG/ML ophthalmic solution 1 drop 2 times daily   Yes Historical Provider, MD   latanoprost (XALATAN) 0.005 % ophthalmic solution 1 drop nightly   Yes Historical Provider, MD   promethazine (PHENERGAN) 50 MG/ML injection Infuse 50 mg intravenously every 6 hours as needed    Historical Provider, MD   Cyanocobalamin (VITAMIN B 12 PO) Take by mouth    Historical Provider, MD   apixaban (ELIQUIS) 5 MG TABS tablet Take 1 tablet by mouth 2 times daily 19   Sherry Rutledge MD       Current medications:    Current Facility-Administered Medications   Medication Dose Route Frequency Provider Last Rate Last Dose    sodium chloride flush 0.9 % injection 10 mL  10 mL Intravenous 2 times per day ANTHONY Schaefer CNP        sodium chloride flush 0.9 % injection 10 mL  10 mL Intravenous PRN ANTHONY Schaefer CNP        ceFAZolin (ANCEF) 2 g in dextrose 3 % 50 mL IVPB (duplex)  2 g Intravenous On Call to [facility][note 3], APRN - CNP           Allergies:     Allergies   Allergen Reactions    Erythromycin Nausea And Vomiting       Problem List: SPECT Study    Name: [facility] Account Number: [de-identified]    :            Sex: female         Date of Study:  2019    Height: 5' 3\" (160 cm)         Weight: 155 lb (70.3 kg)     Ordering Provider: Jordan Gillespie MD          PCP: Aliza Leigh MD      Cardiologist: Jordan Gillespie MD             Interpreting Physician: Colette Frey MD   __________________________________________________________________  _______________    Indication:   Preoperative Risk Stratification    Clinical History:   Patient has no known history of coronary   artery disease. Resting ECG:    KS int 0.2 sec, QRS int 0.8 sec, QT int sec; HR 97 bpm  Normal sinus rhythm.  Possible old septal MI. Procedure:   Pharmacologic stress testing was performed with regadenoson 0.4   mg for 15 seconds.   The heart rate was 97 at baseline and cameron   to 128 beats during the infusion.  This corresponds to 91% of   maximum predicted heart rate.  The blood pressure at baseline was   140/88 and blood pressure at the end of infusion was 128/76.    Blood pressure response was normal during the stress procedure. The patient tolerated the infusion well. ECG during the infusion did not change. IMAGING: Myocardial perfusion imaging was performed at rest 30-35   minutes following the intravenous injection of 10.4 mCi of   (Tc-Sestamibi) followed by 10 ml of Normal Saline.  As per   infusion protocol, the patient was injected intravenously with 33   mCi of (Tc-Sestamibi) followed by 10 ml of Normal Saline.  Gated   post-stress tomographic imaging was performed 20-25 minutes after   stress. FINDINGS: The overall quality of the study was excellent. Left ventricular cavity size was noted to be normal.    Rotational analog analysis demonstrated no patient motion or   abnormal extracardiac radioactivity.     The gated SPECT stress imaging in the short, vertical long, and   horizontal long axis demonstrated normal

## 2019-08-16 LAB — MRSA CULTURE ONLY: NORMAL

## 2019-08-22 ENCOUNTER — HOSPITAL ENCOUNTER (INPATIENT)
Age: 79
LOS: 1 days | Discharge: HOME HEALTH | DRG: 026 | End: 2019-08-23
Attending: SURGERY | Admitting: SURGERY
Payer: MEDICARE

## 2019-08-22 ENCOUNTER — ANESTHESIA (OUTPATIENT)
Dept: OPERATING ROOM | Age: 79
DRG: 026 | End: 2019-08-22
Payer: MEDICARE

## 2019-08-22 VITALS — TEMPERATURE: 95.7 F | OXYGEN SATURATION: 97 %

## 2019-08-22 PROBLEM — I65.22 CAROTID STENOSIS, LEFT: Status: ACTIVE | Noted: 2019-08-22

## 2019-08-22 LAB
INR BLD: 1.2
METER GLUCOSE: 110 MG/DL (ref 74–99)
PROTHROMBIN TIME: 13.9 SEC (ref 9.3–12.4)

## 2019-08-22 PROCEDURE — 85347 COAGULATION TIME ACTIVATED: CPT

## 2019-08-22 PROCEDURE — C1768 GRAFT, VASCULAR: HCPCS | Performed by: SURGERY

## 2019-08-22 PROCEDURE — 3600000005 HC SURGERY LEVEL 5 BASE: Performed by: SURGERY

## 2019-08-22 PROCEDURE — 2709999900 HC NON-CHARGEABLE SUPPLY: Performed by: SURGERY

## 2019-08-22 PROCEDURE — 6360000002 HC RX W HCPCS: Performed by: NURSE ANESTHETIST, CERTIFIED REGISTERED

## 2019-08-22 PROCEDURE — 2000000000 HC ICU R&B

## 2019-08-22 PROCEDURE — 35301 RECHANNELING OF ARTERY: CPT | Performed by: SURGERY

## 2019-08-22 PROCEDURE — 03CL3ZZ EXTIRPATION OF MATTER FROM LEFT INTERNAL CAROTID ARTERY, PERCUTANEOUS APPROACH: ICD-10-PCS | Performed by: SURGERY

## 2019-08-22 PROCEDURE — 6360000002 HC RX W HCPCS: Performed by: SURGERY

## 2019-08-22 PROCEDURE — 6360000002 HC RX W HCPCS: Performed by: NURSE PRACTITIONER

## 2019-08-22 PROCEDURE — 36415 COLL VENOUS BLD VENIPUNCTURE: CPT

## 2019-08-22 PROCEDURE — 3600000015 HC SURGERY LEVEL 5 ADDTL 15MIN: Performed by: SURGERY

## 2019-08-22 PROCEDURE — C9248 INJ, CLEVIDIPINE BUTYRATE: HCPCS | Performed by: STUDENT IN AN ORGANIZED HEALTH CARE EDUCATION/TRAINING PROGRAM

## 2019-08-22 PROCEDURE — 7100000000 HC PACU RECOVERY - FIRST 15 MIN

## 2019-08-22 PROCEDURE — 3700000001 HC ADD 15 MINUTES (ANESTHESIA): Performed by: SURGERY

## 2019-08-22 PROCEDURE — 88304 TISSUE EXAM BY PATHOLOGIST: CPT

## 2019-08-22 PROCEDURE — 7100000001 HC PACU RECOVERY - ADDTL 15 MIN

## 2019-08-22 PROCEDURE — 82962 GLUCOSE BLOOD TEST: CPT

## 2019-08-22 PROCEDURE — 2580000003 HC RX 258: Performed by: NURSE ANESTHETIST, CERTIFIED REGISTERED

## 2019-08-22 PROCEDURE — 85610 PROTHROMBIN TIME: CPT

## 2019-08-22 PROCEDURE — 6370000000 HC RX 637 (ALT 250 FOR IP): Performed by: STUDENT IN AN ORGANIZED HEALTH CARE EDUCATION/TRAINING PROGRAM

## 2019-08-22 PROCEDURE — 2500000003 HC RX 250 WO HCPCS: Performed by: SURGERY

## 2019-08-22 PROCEDURE — 2580000003 HC RX 258: Performed by: STUDENT IN AN ORGANIZED HEALTH CARE EDUCATION/TRAINING PROGRAM

## 2019-08-22 PROCEDURE — 3700000000 HC ANESTHESIA ATTENDED CARE: Performed by: SURGERY

## 2019-08-22 PROCEDURE — 6360000002 HC RX W HCPCS: Performed by: STUDENT IN AN ORGANIZED HEALTH CARE EDUCATION/TRAINING PROGRAM

## 2019-08-22 PROCEDURE — 6370000000 HC RX 637 (ALT 250 FOR IP): Performed by: SURGERY

## 2019-08-22 PROCEDURE — 03UL3KZ SUPPLEMENT LEFT INTERNAL CAROTID ARTERY WITH NONAUTOLOGOUS TISSUE SUBSTITUTE, PERCUTANEOUS APPROACH: ICD-10-PCS | Performed by: SURGERY

## 2019-08-22 PROCEDURE — 2580000003 HC RX 258: Performed by: SURGERY

## 2019-08-22 PROCEDURE — 2500000003 HC RX 250 WO HCPCS: Performed by: NURSE ANESTHETIST, CERTIFIED REGISTERED

## 2019-08-22 DEVICE — XENOSURE BIOLOGIC PATCH, 0.8CM X 8CM, EIFU
Type: IMPLANTABLE DEVICE | Site: CAROTID | Status: FUNCTIONAL
Brand: XENOSURE BIOLOGIC PATCH

## 2019-08-22 RX ORDER — SODIUM CHLORIDE 9 MG/ML
INJECTION, SOLUTION INTRAVENOUS CONTINUOUS PRN
Status: DISCONTINUED | OUTPATIENT
Start: 2019-08-22 | End: 2019-08-22 | Stop reason: SDUPTHER

## 2019-08-22 RX ORDER — PROPOFOL 10 MG/ML
INJECTION, EMULSION INTRAVENOUS PRN
Status: DISCONTINUED | OUTPATIENT
Start: 2019-08-22 | End: 2019-08-22 | Stop reason: SDUPTHER

## 2019-08-22 RX ORDER — OXYCODONE HYDROCHLORIDE AND ACETAMINOPHEN 5; 325 MG/1; MG/1
1 TABLET ORAL EVERY 4 HOURS PRN
Status: DISCONTINUED | OUTPATIENT
Start: 2019-08-22 | End: 2019-08-23

## 2019-08-22 RX ORDER — PROTAMINE SULFATE 10 MG/ML
INJECTION, SOLUTION INTRAVENOUS PRN
Status: DISCONTINUED | OUTPATIENT
Start: 2019-08-22 | End: 2019-08-22 | Stop reason: SDUPTHER

## 2019-08-22 RX ORDER — SODIUM CHLORIDE 0.9 % (FLUSH) 0.9 %
10 SYRINGE (ML) INJECTION EVERY 12 HOURS SCHEDULED
Status: DISCONTINUED | OUTPATIENT
Start: 2019-08-22 | End: 2019-08-23 | Stop reason: HOSPADM

## 2019-08-22 RX ORDER — ONDANSETRON 2 MG/ML
INJECTION INTRAMUSCULAR; INTRAVENOUS PRN
Status: DISCONTINUED | OUTPATIENT
Start: 2019-08-22 | End: 2019-08-22 | Stop reason: SDUPTHER

## 2019-08-22 RX ORDER — OXYCODONE HYDROCHLORIDE AND ACETAMINOPHEN 5; 325 MG/1; MG/1
1 TABLET ORAL EVERY 6 HOURS PRN
Qty: 20 TABLET | Refills: 0 | Status: SHIPPED | OUTPATIENT
Start: 2019-08-22 | End: 2019-08-23 | Stop reason: HOSPADM

## 2019-08-22 RX ORDER — ESMOLOL HYDROCHLORIDE 10 MG/ML
INJECTION INTRAVENOUS PRN
Status: DISCONTINUED | OUTPATIENT
Start: 2019-08-22 | End: 2019-08-22 | Stop reason: SDUPTHER

## 2019-08-22 RX ORDER — METOPROLOL SUCCINATE 25 MG/1
25 TABLET, EXTENDED RELEASE ORAL 2 TIMES DAILY
Status: DISCONTINUED | OUTPATIENT
Start: 2019-08-22 | End: 2019-08-23 | Stop reason: HOSPADM

## 2019-08-22 RX ORDER — ONDANSETRON 2 MG/ML
4 INJECTION INTRAMUSCULAR; INTRAVENOUS EVERY 6 HOURS PRN
Status: DISCONTINUED | OUTPATIENT
Start: 2019-08-22 | End: 2019-08-23 | Stop reason: HOSPADM

## 2019-08-22 RX ORDER — HEPARIN SODIUM 1000 [USP'U]/ML
INJECTION, SOLUTION INTRAVENOUS; SUBCUTANEOUS PRN
Status: DISCONTINUED | OUTPATIENT
Start: 2019-08-22 | End: 2019-08-22 | Stop reason: SDUPTHER

## 2019-08-22 RX ORDER — ROCURONIUM BROMIDE 10 MG/ML
INJECTION, SOLUTION INTRAVENOUS PRN
Status: DISCONTINUED | OUTPATIENT
Start: 2019-08-22 | End: 2019-08-22 | Stop reason: SDUPTHER

## 2019-08-22 RX ORDER — ATORVASTATIN CALCIUM 40 MG/1
40 TABLET, FILM COATED ORAL NIGHTLY
Status: DISCONTINUED | OUTPATIENT
Start: 2019-08-22 | End: 2019-08-23 | Stop reason: HOSPADM

## 2019-08-22 RX ORDER — SODIUM CHLORIDE 0.9 % (FLUSH) 0.9 %
10 SYRINGE (ML) INJECTION EVERY 12 HOURS SCHEDULED
Status: DISCONTINUED | OUTPATIENT
Start: 2019-08-22 | End: 2019-08-22 | Stop reason: HOSPADM

## 2019-08-22 RX ORDER — SODIUM CHLORIDE 0.9 % (FLUSH) 0.9 %
10 SYRINGE (ML) INJECTION PRN
Status: DISCONTINUED | OUTPATIENT
Start: 2019-08-22 | End: 2019-08-23 | Stop reason: HOSPADM

## 2019-08-22 RX ORDER — DEXAMETHASONE SODIUM PHOSPHATE 10 MG/ML
INJECTION INTRAMUSCULAR; INTRAVENOUS PRN
Status: DISCONTINUED | OUTPATIENT
Start: 2019-08-22 | End: 2019-08-22 | Stop reason: SDUPTHER

## 2019-08-22 RX ORDER — MIDAZOLAM HYDROCHLORIDE 1 MG/ML
INJECTION INTRAMUSCULAR; INTRAVENOUS PRN
Status: DISCONTINUED | OUTPATIENT
Start: 2019-08-22 | End: 2019-08-22 | Stop reason: SDUPTHER

## 2019-08-22 RX ORDER — SODIUM CHLORIDE 9 MG/ML
INJECTION, SOLUTION INTRAVENOUS CONTINUOUS
Status: DISCONTINUED | OUTPATIENT
Start: 2019-08-22 | End: 2019-08-23 | Stop reason: HOSPADM

## 2019-08-22 RX ORDER — CEFAZOLIN SODIUM 2 G/50ML
2 SOLUTION INTRAVENOUS EVERY 8 HOURS
Status: COMPLETED | OUTPATIENT
Start: 2019-08-22 | End: 2019-08-23

## 2019-08-22 RX ORDER — NEOSTIGMINE METHYLSULFATE 1 MG/ML
INJECTION, SOLUTION INTRAVENOUS PRN
Status: DISCONTINUED | OUTPATIENT
Start: 2019-08-22 | End: 2019-08-22 | Stop reason: SDUPTHER

## 2019-08-22 RX ORDER — FENTANYL CITRATE 50 UG/ML
INJECTION, SOLUTION INTRAMUSCULAR; INTRAVENOUS PRN
Status: DISCONTINUED | OUTPATIENT
Start: 2019-08-22 | End: 2019-08-22 | Stop reason: SDUPTHER

## 2019-08-22 RX ORDER — SODIUM CHLORIDE 0.9 % (FLUSH) 0.9 %
10 SYRINGE (ML) INJECTION PRN
Status: DISCONTINUED | OUTPATIENT
Start: 2019-08-22 | End: 2019-08-22 | Stop reason: HOSPADM

## 2019-08-22 RX ORDER — PHENYLEPHRINE HYDROCHLORIDE 10 MG/ML
INJECTION INTRAVENOUS PRN
Status: DISCONTINUED | OUTPATIENT
Start: 2019-08-22 | End: 2019-08-22 | Stop reason: SDUPTHER

## 2019-08-22 RX ORDER — FAMOTIDINE 20 MG/1
20 TABLET, FILM COATED ORAL DAILY
Status: DISCONTINUED | OUTPATIENT
Start: 2019-08-22 | End: 2019-08-23 | Stop reason: HOSPADM

## 2019-08-22 RX ORDER — GLYCOPYRROLATE 1 MG/5 ML
SYRINGE (ML) INTRAVENOUS PRN
Status: DISCONTINUED | OUTPATIENT
Start: 2019-08-22 | End: 2019-08-22 | Stop reason: SDUPTHER

## 2019-08-22 RX ORDER — CEFAZOLIN SODIUM 2 G/50ML
2 SOLUTION INTRAVENOUS
Status: COMPLETED | OUTPATIENT
Start: 2019-08-22 | End: 2019-08-22

## 2019-08-22 RX ORDER — ASPIRIN 81 MG/1
81 TABLET, CHEWABLE ORAL DAILY
Status: DISCONTINUED | OUTPATIENT
Start: 2019-08-23 | End: 2019-08-23 | Stop reason: HOSPADM

## 2019-08-22 RX ORDER — ACETAMINOPHEN 325 MG/1
650 TABLET ORAL EVERY 4 HOURS PRN
Status: DISCONTINUED | OUTPATIENT
Start: 2019-08-22 | End: 2019-08-23 | Stop reason: HOSPADM

## 2019-08-22 RX ORDER — FAMOTIDINE 20 MG/1
20 TABLET, FILM COATED ORAL 2 TIMES DAILY
Status: DISCONTINUED | OUTPATIENT
Start: 2019-08-22 | End: 2019-08-22 | Stop reason: DRUGHIGH

## 2019-08-22 RX ORDER — LIDOCAINE HYDROCHLORIDE 20 MG/ML
INJECTION, SOLUTION INTRAVENOUS PRN
Status: DISCONTINUED | OUTPATIENT
Start: 2019-08-22 | End: 2019-08-22 | Stop reason: SDUPTHER

## 2019-08-22 RX ORDER — OXYCODONE HYDROCHLORIDE AND ACETAMINOPHEN 5; 325 MG/1; MG/1
2 TABLET ORAL EVERY 4 HOURS PRN
Status: DISCONTINUED | OUTPATIENT
Start: 2019-08-22 | End: 2019-08-23

## 2019-08-22 RX ADMIN — FENTANYL CITRATE 25 MCG: 50 INJECTION, SOLUTION INTRAMUSCULAR; INTRAVENOUS at 11:42

## 2019-08-22 RX ADMIN — DEXAMETHASONE SODIUM PHOSPHATE 10 MG: 10 INJECTION INTRAMUSCULAR; INTRAVENOUS at 11:15

## 2019-08-22 RX ADMIN — PHENYLEPHRINE HYDROCHLORIDE 100 MCG: 10 INJECTION INTRAVENOUS at 11:26

## 2019-08-22 RX ADMIN — CEFAZOLIN SODIUM 2 G: 2 SOLUTION INTRAVENOUS at 20:01

## 2019-08-22 RX ADMIN — HEPARIN SODIUM 7000 UNITS: 1000 INJECTION INTRAVENOUS; SUBCUTANEOUS at 11:59

## 2019-08-22 RX ADMIN — OXYCODONE HYDROCHLORIDE AND ACETAMINOPHEN 1 TABLET: 5; 325 TABLET ORAL at 16:00

## 2019-08-22 RX ADMIN — PROPOFOL 13 MG: 10 INJECTION, EMULSION INTRAVENOUS at 11:15

## 2019-08-22 RX ADMIN — ONDANSETRON HYDROCHLORIDE 4 MG: 2 INJECTION, SOLUTION INTRAMUSCULAR; INTRAVENOUS at 13:00

## 2019-08-22 RX ADMIN — PHENYLEPHRINE HYDROCHLORIDE 100 MCG/MIN: 10 INJECTION INTRAVENOUS at 11:54

## 2019-08-22 RX ADMIN — Medication 0.6 MG: at 13:06

## 2019-08-22 RX ADMIN — Medication 3 MG: at 13:06

## 2019-08-22 RX ADMIN — SODIUM CHLORIDE: 9 INJECTION, SOLUTION INTRAVENOUS at 11:07

## 2019-08-22 RX ADMIN — METOPROLOL SUCCINATE 25 MG: 25 TABLET, EXTENDED RELEASE ORAL at 20:04

## 2019-08-22 RX ADMIN — ESMOLOL HYDROCHLORIDE 80 MG: 10 INJECTION, SOLUTION INTRAVENOUS at 13:12

## 2019-08-22 RX ADMIN — CEFAZOLIN SODIUM 2 G: 2 SOLUTION INTRAVENOUS at 11:27

## 2019-08-22 RX ADMIN — FENTANYL CITRATE 100 MCG: 50 INJECTION, SOLUTION INTRAMUSCULAR; INTRAVENOUS at 11:15

## 2019-08-22 RX ADMIN — FAMOTIDINE 20 MG: 20 TABLET ORAL at 14:52

## 2019-08-22 RX ADMIN — PROTAMINE SULFATE 30 MG: 10 INJECTION, SOLUTION INTRAVENOUS at 12:45

## 2019-08-22 RX ADMIN — LIDOCAINE HYDROCHLORIDE 60 MG: 20 INJECTION, SOLUTION INTRAVENOUS at 11:15

## 2019-08-22 RX ADMIN — SODIUM CHLORIDE: 9 INJECTION, SOLUTION INTRAVENOUS at 13:51

## 2019-08-22 RX ADMIN — ROCURONIUM BROMIDE 40 MG: 10 INJECTION, SOLUTION INTRAVENOUS at 11:15

## 2019-08-22 RX ADMIN — MIDAZOLAM HYDROCHLORIDE 1 MG: 1 INJECTION, SOLUTION INTRAMUSCULAR; INTRAVENOUS at 11:15

## 2019-08-22 RX ADMIN — ATORVASTATIN CALCIUM 40 MG: 40 TABLET, FILM COATED ORAL at 20:04

## 2019-08-22 RX ADMIN — CLEVIPIDINE 2 MG/HR: 0.5 EMULSION INTRAVENOUS at 13:35

## 2019-08-22 ASSESSMENT — PULMONARY FUNCTION TESTS
PIF_VALUE: 21
PIF_VALUE: 19
PIF_VALUE: 19
PIF_VALUE: 3
PIF_VALUE: 19
PIF_VALUE: 19
PIF_VALUE: 20
PIF_VALUE: 21
PIF_VALUE: 22
PIF_VALUE: 21
PIF_VALUE: 20
PIF_VALUE: 21
PIF_VALUE: 20
PIF_VALUE: 22
PIF_VALUE: 24
PIF_VALUE: 21
PIF_VALUE: 22
PIF_VALUE: 21
PIF_VALUE: 22
PIF_VALUE: 19
PIF_VALUE: 22
PIF_VALUE: 23
PIF_VALUE: 21
PIF_VALUE: 2
PIF_VALUE: 22
PIF_VALUE: 2
PIF_VALUE: 24
PIF_VALUE: 21
PIF_VALUE: 21
PIF_VALUE: 22
PIF_VALUE: 22
PIF_VALUE: 19
PIF_VALUE: 21
PIF_VALUE: 21
PIF_VALUE: 22
PIF_VALUE: 22
PIF_VALUE: 21
PIF_VALUE: 19
PIF_VALUE: 21
PIF_VALUE: 18
PIF_VALUE: 2
PIF_VALUE: 20
PIF_VALUE: 18
PIF_VALUE: 22
PIF_VALUE: 21
PIF_VALUE: 21
PIF_VALUE: 22
PIF_VALUE: 18
PIF_VALUE: 19
PIF_VALUE: 21
PIF_VALUE: 21
PIF_VALUE: 23
PIF_VALUE: 22
PIF_VALUE: 1
PIF_VALUE: 18
PIF_VALUE: 21
PIF_VALUE: 21
PIF_VALUE: 18
PIF_VALUE: 20
PIF_VALUE: 18
PIF_VALUE: 21
PIF_VALUE: 18
PIF_VALUE: 23
PIF_VALUE: 19
PIF_VALUE: 22
PIF_VALUE: 22
PIF_VALUE: 21
PIF_VALUE: 22
PIF_VALUE: 3
PIF_VALUE: 3
PIF_VALUE: 2
PIF_VALUE: 22
PIF_VALUE: 20
PIF_VALUE: 21
PIF_VALUE: 21
PIF_VALUE: 22
PIF_VALUE: 22
PIF_VALUE: 1
PIF_VALUE: 22
PIF_VALUE: 21
PIF_VALUE: 19
PIF_VALUE: 21
PIF_VALUE: 21
PIF_VALUE: 18
PIF_VALUE: 21
PIF_VALUE: 22
PIF_VALUE: 21
PIF_VALUE: 18
PIF_VALUE: 21
PIF_VALUE: 20
PIF_VALUE: 22
PIF_VALUE: 20
PIF_VALUE: 19
PIF_VALUE: 22
PIF_VALUE: 22
PIF_VALUE: 21
PIF_VALUE: 22
PIF_VALUE: 22
PIF_VALUE: 18
PIF_VALUE: 20
PIF_VALUE: 22
PIF_VALUE: 3
PIF_VALUE: 22
PIF_VALUE: 20
PIF_VALUE: 22
PIF_VALUE: 0
PIF_VALUE: 22
PIF_VALUE: 21
PIF_VALUE: 20
PIF_VALUE: 22
PIF_VALUE: 20
PIF_VALUE: 21
PIF_VALUE: 22
PIF_VALUE: 22
PIF_VALUE: 21
PIF_VALUE: 21
PIF_VALUE: 3
PIF_VALUE: 22
PIF_VALUE: 22

## 2019-08-22 ASSESSMENT — PAIN SCALES - GENERAL
PAINLEVEL_OUTOF10: 0
PAINLEVEL_OUTOF10: 0
PAINLEVEL_OUTOF10: 6
PAINLEVEL_OUTOF10: 0

## 2019-08-22 ASSESSMENT — PAIN DESCRIPTION - DESCRIPTORS: DESCRIPTORS: HEADACHE

## 2019-08-22 ASSESSMENT — PAIN DESCRIPTION - LOCATION: LOCATION: HEAD

## 2019-08-22 ASSESSMENT — PAIN - FUNCTIONAL ASSESSMENT: PAIN_FUNCTIONAL_ASSESSMENT: 0-10

## 2019-08-22 ASSESSMENT — PAIN DESCRIPTION - PAIN TYPE: TYPE: ACUTE PAIN

## 2019-08-22 NOTE — OP NOTE
Anastasia Yon  1940      DATE OF PROCEDURE: 8/22/2019     SURGEON: Moreno Almodovar M.D.     ASSISTANT: Dr. Augustina Ibanez, and Cindy Fam     PREOPERATIVE DIAGNOSIS: symptomatic 90% left internal carotid artery stenosis. POSTOPERATIVE DIAGNOSIS: Same    OPERATION: left carotid endarterectomy with bovine patch angioplasty. ANESTHESIA: GETA     ESTIMATED BLOOD LOSS: less than 50 ml     COMPLICATIONS: None     DESCRIPTION OF PROCEDURE: The patient was identified and the procedure was confirmed. The left neck was prepped and draped in the usual sterile fashion. A skin incision was made along the anterior border of the sternocleidomastoid muscle and carried down through the subcutaneous tissue. Dissection continued through the platysma and along the anterior border of the sternocleidomastoid muscle. The common facial vein was divided between silk ties. The common carotid artery was identified and dissected free from the surrounding tissues. It was surrounded proximally in the soft portion with an umbilical tape. The vagus nerve was deep to the artery and preserved. Dissection continued along the carotid artery and the external carotid artery and its superior thyroid branch were dissected free from the surrounding tissues and surrounded with vessel loops for control. The patient was then heparinized, maintaining an activated clotting time greater than 300 seconds. Dissection continued along the internal carotid artery, which was freed from the surrounding tissues and surrounded with a vessel loop for control. The hypoglossal nerve was identified and preserved. The vessel loops on the external carotid artery branches were controlled and the internal carotid artery was clamped. Back pressures were taken with a reading of 114-117 mean.  The common carotid artery was clamped and then a longitudinal arteriotomy was made in the common carotid artery and extended through the bulb and onto the internal carotid artery. There was a 90% stenosis in the origin of the internal carotid artery. A standard endarterectomy was then performed of the obtaining smooth end points on the distal common and internal carotid arteries. Loose fibrinous debris was removed from the vessel bed, which was flushed with heparinized saline solution. 2-7-0 (tacking)  A bovine patch was obtained and cut to the appropriate length and sewn to the artery using a running 6-0 Prolene suture. Prior to completing the closure, the shunt was clamped, cut, and removed from the common carotid artery which was flushed then clamped, and then from the internal carotid artery which was back bled then clamped. The closure was completed and flow was restored initially through the external carotid artery followed by the internal carotid artery. Flow through the vessels was confirmed with the handheld Doppler probe. The patient was given protamine and hemostasis was obtained. The incision was irrigated with antibiotic saline solution. The platysma was approximated with interrupted 3-0 Vicryl sutures and 0.25% Marcaine was injected into the subcutaneous tissue surrounding the incision. The skin was closed with a subcuticular Vicryl stitch and Dermabond was applied to the skin incision in the operating room. Needle, sponge, and instrument counts were reported as correct x2. The patient tolerated the procedure and was transferred to the Cardiovascular ICU in satisfactory condition.     Leelee Jeong    CC : Kemal Trinidad MD

## 2019-08-22 NOTE — PLAN OF CARE
Problem: Falls - Risk of:  Goal: Will remain free from falls  Description  Will remain free from falls  Outcome: Met This Shift  Goal: Absence of physical injury  Description  Absence of physical injury  Outcome: Met This Shift     Problem: Gas Exchange - Impaired:  Goal: Levels of oxygenation will improve  Description  Levels of oxygenation will improve  Outcome: Met This Shift     Problem: Mental Status - Impaired:  Goal: Mental status will be restored to baseline  Description  Mental status will be restored to baseline  Outcome: Met This Shift     Problem: Pain:  Goal: Recognizes and communicates pain  Description  Recognizes and communicates pain  Outcome: Met This Shift

## 2019-08-22 NOTE — H&P
Vascular Surgery History & Physical Exam      Chief Complaint: Carotid artery stenosis    HISTORY OF PRESENT ILLNESS:                The patient is a [de-identified] y.o. female who presents to the hospital secondary to critical carotid artery stenosis. Overall she has not had any new symptoms. She denies any new right-sided left-sided weakness numbness or vision changes. She denies any chest pain shortness of breath or discomfort. .      Past Medical History:   Diagnosis Date    Arthritis     Atrial fibrillation ([address]     Carotid stenosis, right     Cerebral artery occlusion with cerebral infarction (HCC)     MEMORY ISSUES, GAIT ISSUES    Glaucoma     History of blood transfusion 1953    rupture appendicitis    Hx of blood clots     developed in leg after appendectomy age 13 yrs     Hyperlipidemia     Hypertension     IBS (irritable bowel syndrome)     Nausea     Nosebleed 08/09/2019    in eor dr Zhanna Mccormick left nostril packing    UTI (lower urinary tract infection)         Past Surgical History:   Procedure Laterality Date    APPENDECTOMY      EYE SURGERY Bilateral     cataracts       Current Medications:     Current Facility-Administered Medications:     sodium chloride flush 0.9 % injection 10 mL, 10 mL, Intravenous, 2 times per day, ANTHONY Powers CNP    sodium chloride flush 0.9 % injection 10 mL, 10 mL, Intravenous, PRN, ANTHONY Powers CNP    ceFAZolin (ANCEF) 2 g in dextrose 3 % 50 mL IVPB (duplex), 2 g, Intravenous, On Call to OR, ANTHONY Powers CNP    Allergies:  Erythromycin    Social History     Socioeconomic History    Marital status:       Spouse name: Not on file    Number of children: Not on file    Years of education: Not on file    Highest education level: Not on file   Occupational History    Not on file   Social Needs    Financial resource strain: Not on file    Food insecurity:     Worry: Not on file     Inability: Not on file    Transportation needs: also denies any nonhealing lower extremity wounds see history of present illness      PHYSICAL EXAM:    Vitals:    08/22/19 1004   BP: (!) 157/94   Pulse: 99   Resp: 16   Temp: 98.1 °F (36.7 °C)   SpO2: 96%     CONSTITUTIONAL: Alert and oriented answers questions appropriately currently in no acute distress  NECK: Honey Swenson is midline no jugular venous distention. No carotid bruits that I can appreciate  LUNGS: Clear to auscultation bilaterally no wheezes rales or rhonchi  CARDIOVASCULAR: Currently regular rate and rhythm no murmur rub or gallop  ABDOMEN: Soft nontender no rebound or guarding positive bowel sounds  EXTREMITIES: Palpable bilateral brachial and radial pulses. DP and PTs are palpable at 2-3+. NEURO: Cranial nerves II through XII are grossly intact bilateral without any gross cranial nerve deficit      LABS:    Lab Results   Component Value Date    WBC 5.6 08/09/2019    HGB 12.9 08/09/2019    HCT 40.3 08/09/2019     08/09/2019    PROTIME 20.4 (H) 08/09/2019    INR 1.8 08/09/2019    APTT 35.1 (H) 08/09/2019    K 4.3 08/15/2019    BUN 14 08/15/2019    CREATININE 1.0 08/15/2019       RADIOLOGY:  No orders to display       IMPRESSION:   Active Hospital Problems    Diagnosis    Carotid stenosis, left [I65.22]       PLAN: #1 symptom medic left carotid artery disease planning left carotid endarterectomy.   Risk benefits and alternatives were discussed with the patient and family today  At the bedside as well as in the office prior to scheduling and also in the hospital.  They include but not limited to bleeding, infection, arterial venous nerve injury, myocardia infarction, respiratory failure, anesthetic reaction, sensorimotor disturbance, stroke, and/or death she understands and wishes to proceed          Electronically signed by Paul Sebastian MD on 8/22/2019 at 11:06 AM

## 2019-08-23 ENCOUNTER — TELEPHONE (OUTPATIENT)
Dept: VASCULAR SURGERY | Age: 79
End: 2019-08-23

## 2019-08-23 VITALS
RESPIRATION RATE: 15 BRPM | OXYGEN SATURATION: 95 % | WEIGHT: 155 LBS | TEMPERATURE: 97.5 F | HEIGHT: 62 IN | BODY MASS INDEX: 28.52 KG/M2 | SYSTOLIC BLOOD PRESSURE: 151 MMHG | DIASTOLIC BLOOD PRESSURE: 88 MMHG | HEART RATE: 107 BPM

## 2019-08-23 LAB
ANION GAP SERPL CALCULATED.3IONS-SCNC: 15 MMOL/L (ref 7–16)
BUN BLDV-MCNC: 12 MG/DL (ref 8–23)
CALCIUM SERPL-MCNC: 8.3 MG/DL (ref 8.6–10.2)
CHLORIDE BLD-SCNC: 104 MMOL/L (ref 98–107)
CO2: 20 MMOL/L (ref 22–29)
CREAT SERPL-MCNC: 0.8 MG/DL (ref 0.5–1)
GFR AFRICAN AMERICAN: >60
GFR NON-AFRICAN AMERICAN: >60 ML/MIN/1.73
GLUCOSE BLD-MCNC: 153 MG/DL (ref 74–99)
HCT VFR BLD CALC: 36.7 % (ref 34–48)
HEMOGLOBIN: 12.1 G/DL (ref 11.5–15.5)
MAGNESIUM: 2 MG/DL (ref 1.6–2.6)
MCH RBC QN AUTO: 29.1 PG (ref 26–35)
MCHC RBC AUTO-ENTMCNC: 33 % (ref 32–34.5)
MCV RBC AUTO: 88.2 FL (ref 80–99.9)
PDW BLD-RTO: 13.7 FL (ref 11.5–15)
PLATELET # BLD: 214 E9/L (ref 130–450)
PMV BLD AUTO: 9.6 FL (ref 7–12)
POC ACT LR: 128 SECONDS
POC ACT LR: 135 SECONDS
POC ACT LR: 265 SECONDS
POC ACT LR: 305 SECONDS
POTASSIUM REFLEX MAGNESIUM: 3.5 MMOL/L (ref 3.5–5)
RBC # BLD: 4.16 E12/L (ref 3.5–5.5)
SODIUM BLD-SCNC: 139 MMOL/L (ref 132–146)
WBC # BLD: 8.1 E9/L (ref 4.5–11.5)

## 2019-08-23 PROCEDURE — 6370000000 HC RX 637 (ALT 250 FOR IP): Performed by: NURSE PRACTITIONER

## 2019-08-23 PROCEDURE — 36415 COLL VENOUS BLD VENIPUNCTURE: CPT

## 2019-08-23 PROCEDURE — C9248 INJ, CLEVIDIPINE BUTYRATE: HCPCS | Performed by: STUDENT IN AN ORGANIZED HEALTH CARE EDUCATION/TRAINING PROGRAM

## 2019-08-23 PROCEDURE — 80048 BASIC METABOLIC PNL TOTAL CA: CPT

## 2019-08-23 PROCEDURE — 6360000002 HC RX W HCPCS: Performed by: STUDENT IN AN ORGANIZED HEALTH CARE EDUCATION/TRAINING PROGRAM

## 2019-08-23 PROCEDURE — 85027 COMPLETE CBC AUTOMATED: CPT

## 2019-08-23 PROCEDURE — 6370000000 HC RX 637 (ALT 250 FOR IP): Performed by: STUDENT IN AN ORGANIZED HEALTH CARE EDUCATION/TRAINING PROGRAM

## 2019-08-23 PROCEDURE — 6360000002 HC RX W HCPCS: Performed by: NURSE PRACTITIONER

## 2019-08-23 PROCEDURE — 83735 ASSAY OF MAGNESIUM: CPT

## 2019-08-23 RX ADMIN — METOPROLOL SUCCINATE 25 MG: 25 TABLET, EXTENDED RELEASE ORAL at 07:38

## 2019-08-23 RX ADMIN — ASPIRIN 81 MG 81 MG: 81 TABLET ORAL at 07:38

## 2019-08-23 RX ADMIN — FAMOTIDINE 20 MG: 20 TABLET ORAL at 07:38

## 2019-08-23 RX ADMIN — CEFAZOLIN SODIUM 2 G: 2 SOLUTION INTRAVENOUS at 04:06

## 2019-08-23 RX ADMIN — CLEVIPIDINE 2 MG/HR: 0.5 EMULSION INTRAVENOUS at 04:06

## 2019-08-23 ASSESSMENT — PAIN SCALES - GENERAL
PAINLEVEL_OUTOF10: 0

## 2019-08-23 NOTE — TELEPHONE ENCOUNTER
----- Message from ANTHONY Sheppard CNP sent at 8/23/2019  8:44 AM EDT -----  Please schedule Ms Casper Dayron for follow up with Dr Karthikeyan White in 2 weeks s/p left CEA

## 2019-08-23 NOTE — PLAN OF CARE
Problem: Falls - Risk of:  Goal: Will remain free from falls  Description  Will remain free from falls  8/22/2019 2039 by Julius Ramires RN  Outcome: Met This Shift     Problem: Falls - Risk of:  Goal: Absence of physical injury  Description  Absence of physical injury  8/22/2019 2039 by Julius Ramires RN  Outcome: Met This Shift     Problem: Cardiac Output - Decreased:  Goal: Hemodynamic stability will improve  Description  Hemodynamic stability will improve  8/22/2019 2039 by Julius Ramires RN  Outcome: Met This Shift     Problem: Gas Exchange - Impaired:  Goal: Levels of oxygenation will improve  Description  Levels of oxygenation will improve  8/22/2019 2039 by Julius Ramires RN  Outcome: Met This Shift     Problem: Mental Status - Impaired:  Goal: Mental status will be restored to baseline  Description  Mental status will be restored to baseline  8/22/2019 2039 by Julius Ramires RN  Outcome: Met This Shift     Problem: Nutrition Deficit:  Goal: Ability to achieve adequate nutritional intake will improve  Description  Ability to achieve adequate nutritional intake will improve  Outcome: Met This Shift     Problem: Pain:  Goal: Pain level will decrease  Description  Pain level will decrease  8/22/2019 2039 by Julius Ramires RN  Outcome: Met This Shift     Problem: Pain:  Goal: Pain level will decrease  Description  Pain level will decrease  8/22/2019 2039 by Julius Ramires RN  Outcome: Met This Shift

## 2019-08-23 NOTE — CARE COORDINATION
Social work made aware patient would benefit from [facility]. Patient has Hx [facility]. SW called George Kangolden over phone, she requested SW speak with her dtr. SW spoke with dtr, she is agreeable to plan for [facility] and would like [facility] again. SW made referral to [employer] with [facility] and aware of d/c home.   Electronically signed by CATALINA Lozano on 8/23/2019 at 2:04 PM

## 2019-08-24 NOTE — PROGRESS NOTES
CLINICAL PHARMACY NOTE: MEDS TO [address] Select Patient?: Yes  Total # of Prescriptions Filled: 1   The following medications were delivered to the patient:  · Hydrocodone-apap 5-325  Total # of Interventions Completed: 3  Time Spent (min): 30    Additional Documentation:
CVICU Admission Note    Name: Emery Glover  MRN: [id number]    CC: Postoperative Critical Care Management     Indication for Surgery/Procedure: Carotid stenosis     Important/Relevant PMH/PSH: HTN, HPL, DVT,CVA-Acute embolic left thalamic and left temporal lobe infarct  (memory issues/gait issues, numbness bilateral fingers), Atrial fibrillation, Glaucoma, IBS, appendectomy, former smoker quit 40yrs ago, nose bleeds, protein S factor deficiency        Procedure/Surgeries:  8/22/2019  Left carotid endarterectomy with bovine pericardial patch angioplasty       Physical Exam:    BP (!) 157/94   Pulse 99   Temp 98.1 °F (36.7 °C) (Temporal)   Resp 16   Wt 156 lb (70.8 kg)   SpO2 96%   BMI 28.53 kg/m²     General Appearance: Arrived to ICU in stable condition   Eyes: PERRL  Pulmonary: CTA bilaterally. No wheezes, no accessory muscle use noted   Cardiovascular: IRR, no heaves or thrills palpated   Telemetry: Afib, rate controlled 90s  Abdomen: Soft, nontender, nondistended, hypoactive BS  Extremities: Palpable pulses all extremities, no edema   Neurologic/Psych: Alert and oriented, following commands appropriately, seems to be equal in strength bilaterally, tongue midline    Skin: Warm and dry   Incision: left neck incision well approximated, minimal swelling       Assessment/Plan: Day of Surgery     1.  Carotid stenosis S/p Left CEA    -ongoing neurovascular/ incision checks   -Ancef  -NPO for now, IVF running   -IV dilaudid for pain control until taking po   -HTN, Cleviprex ordered for target SBP<160  -afib- on eliquis preop- hold today     Electronically signed by ANTHONY Soto - CNP on 8/22/2019 at 1:28 PM
OF HEAD. UNTIL CLEARED BY DOCTOR. MEDICATION INSTRUCTIONS:   [x]Bring a complete list of your medications, please write the last time you took the medicine, give this list to the nurse. [x] Take the following medications the morning of surgery with 1-2 ounces of water: METOPROLOL  [x] Stop herbal supplements and vitamins 5 days before your surgery. [x] Follow physician instructions regarding any blood thinners you may be taking. ASPIRING STOPPED. LAST DOSE OF ELIQUIS, 8/19. WHAT TO EXPECT:  [x] The day of surgery you will be greeted and checked in by the [facility].  In addition, you will be registered in the [facility] by a Patient Access Representative. Please bring your photo ID and insurance card. A nurse will greet you in accordance to the time you are needed in the pre-op area to prepare you for surgery. Please do not be discouraged if you are not greeted in the order you arrive as there are many variables that are involved in patient preparation. Your patience is greatly appreciated as you wait for your nurse. Please bring in items such as: books, magazines, newspapers, electronics, or any other items  to occupy your time in the waiting area. [x]  Delays may occur with surgery and staff will make a sincere effort to keep you informed of delays. If any delays occur with your procedure, we apologize ahead of time for your inconvenience as we recognize the value of your time.

## 2019-09-11 ENCOUNTER — TELEPHONE (OUTPATIENT)
Dept: VASCULAR SURGERY | Age: 79
End: 2019-09-11

## 2019-09-11 ENCOUNTER — OFFICE VISIT (OUTPATIENT)
Dept: VASCULAR SURGERY | Age: 79
End: 2019-09-11

## 2019-09-11 VITALS — HEART RATE: 80 BPM | DIASTOLIC BLOOD PRESSURE: 86 MMHG | SYSTOLIC BLOOD PRESSURE: 132 MMHG

## 2019-09-11 DIAGNOSIS — I65.22 STENOSIS OF LEFT CAROTID ARTERY: Primary | ICD-10-CM

## 2019-09-11 PROCEDURE — 99024 POSTOP FOLLOW-UP VISIT: CPT | Performed by: SURGERY

## 2019-09-11 RX ORDER — LISINOPRIL 20 MG/1
20 TABLET ORAL DAILY
Refills: 0 | COMMUNITY
Start: 2019-08-27

## 2019-09-11 RX ORDER — AMLODIPINE BESYLATE 5 MG/1
5 TABLET ORAL NIGHTLY
Refills: 0 | COMMUNITY
Start: 2019-08-30

## 2019-09-11 NOTE — PROGRESS NOTES
Vascular Surgery Outpatient Progress Note      Chief Complaint   Patient presents with    Post-Op Check     s/p left carotid endarterectomy with bovine patch angioplasty. HISTORY OF PRESENT ILLNESS:                The patient is a [de-identified] y.o. female who returns for follow-up evaluation of left carotid endarterectomy. She is status post left carotid endarterectomy for symptomatic carotid disease. She denies any right-sided left-sided weakness numbness she denies any vision changes chest pain shortness of breath or discomfort. Past Medical History:        Diagnosis Date    Arthritis     Atrial fibrillation ([address]     Carotid stenosis, right     Cerebral artery occlusion with cerebral infarction (HCC)     MEMORY ISSUES, GAIT ISSUES    Glaucoma     History of blood transfusion 1953    rupture appendicitis    Hx of blood clots     developed in leg after appendectomy age 13 yrs     Hyperlipidemia     Hypertension     IBS (irritable bowel syndrome)     Nausea     Nosebleed 08/09/2019    in eor dr Ghazala Rodríguez left nostril packing    UTI (lower urinary tract infection)      Past Surgical History:        Procedure Laterality Date    APPENDECTOMY      CAROTID ENDARTERECTOMY Left 8/22/2019    LEFT CAROTID ENDARTERECTOMY performed by Eneida Yeboah MD at [address] [place] [address] Bilateral     cataracts     Current Medications:   Prior to Admission medications    Medication Sig Start Date End Date Taking?  Authorizing Provider   amLODIPine (NORVASC) 5 MG tablet Take 5 mg by mouth daily 8/30/19  Yes Historical Provider, MD   lisinopril (PRINIVIL;ZESTRIL) 20 MG tablet Take 20 mg by mouth daily 8/27/19  Yes Historical Provider, MD   promethazine (PHENERGAN) 50 MG/ML injection Infuse 50 mg intravenously every 6 hours as needed   Yes Historical Provider, MD   Cyanocobalamin (VITAMIN B 12 PO) Take by mouth   Yes Historical Provider, MD   apixaban (ELIQUIS) 5 MG TABS tablet Take 1 tablet by mouth 2 times daily 6/11/19 Yes Harika Beverly MD   atorvastatin (LIPITOR) 40 MG tablet Take 1 tablet by mouth nightly 19  Yes Harika Beverly MD   metoprolol succinate (TOPROL XL) 25 MG extended release tablet Take 1 tablet by mouth 2 times daily  Patient taking differently: Take 75 mg by mouth 2 times daily  19  Yes Harika Beverly MD   Probiotic Product (PROBIOTIC DAILY PO) Take 1 tablet by mouth   Yes Historical Provider, MD   dorzolamide-timolol (COSOPT) 22.3-6.8 MG/ML ophthalmic solution 1 drop 2 times daily   Yes Historical Provider, MD   latanoprost (XALATAN) 0.005 % ophthalmic solution 1 drop nightly   Yes Historical Provider, MD   aspirin 81 MG chewable tablet Take 1 tablet by mouth daily  Patient not taking: Reported on 2019   Harika Beverly MD     Allergies:  Erythromycin    Social History     Socioeconomic History    Marital status:       Spouse name: Not on file    Number of children: Not on file    Years of education: Not on file    Highest education level: Not on file   Occupational History    Not on file   Social Needs    Financial resource strain: Not on file    Food insecurity:     Worry: Not on file     Inability: Not on file    Transportation needs:     Medical: Not on file     Non-medical: Not on file   Tobacco Use    Smoking status: Former Smoker     Last attempt to quit: 1986     Years since quittin.1    Smokeless tobacco: Never Used   Substance and Sexual Activity    Alcohol use: No    Drug use: No    Sexual activity: Not on file   Lifestyle    Physical activity:     Days per week: Not on file     Minutes per session: Not on file    Stress: Not on file   Relationships    Social connections:     Talks on phone: Not on file     Gets together: Not on file     Attends Church service: Not on file     Active member of club or organization: Not on file     Attends meetings of clubs or organizations: Not on file     Relationship status: Not on file    Intimate partner

## 2019-09-16 ENCOUNTER — OFFICE VISIT (OUTPATIENT)
Dept: CARDIOLOGY CLINIC | Age: 79
End: 2019-09-16
Payer: MEDICARE

## 2019-09-16 VITALS
HEIGHT: 62 IN | DIASTOLIC BLOOD PRESSURE: 82 MMHG | SYSTOLIC BLOOD PRESSURE: 132 MMHG | WEIGHT: 152.4 LBS | BODY MASS INDEX: 28.05 KG/M2 | HEART RATE: 90 BPM | RESPIRATION RATE: 18 BRPM

## 2019-09-16 DIAGNOSIS — I38 VALVULAR HEART DISEASE: ICD-10-CM

## 2019-09-16 DIAGNOSIS — I10 ESSENTIAL HYPERTENSION: ICD-10-CM

## 2019-09-16 DIAGNOSIS — I48.19 PERSISTENT ATRIAL FIBRILLATION (HCC): Primary | ICD-10-CM

## 2019-09-16 DIAGNOSIS — Z79.01 CHRONIC ANTICOAGULATION: ICD-10-CM

## 2019-09-16 DIAGNOSIS — I65.22 STENOSIS OF LEFT CAROTID ARTERY: ICD-10-CM

## 2019-09-16 PROCEDURE — 99214 OFFICE O/P EST MOD 30 MIN: CPT | Performed by: INTERNAL MEDICINE

## 2019-09-16 PROCEDURE — 4040F PNEUMOC VAC/ADMIN/RCVD: CPT | Performed by: INTERNAL MEDICINE

## 2019-09-16 PROCEDURE — G8400 PT W/DXA NO RESULTS DOC: HCPCS | Performed by: INTERNAL MEDICINE

## 2019-09-16 PROCEDURE — 93000 ELECTROCARDIOGRAM COMPLETE: CPT | Performed by: INTERNAL MEDICINE

## 2019-09-16 PROCEDURE — 1111F DSCHRG MED/CURRENT MED MERGE: CPT | Performed by: INTERNAL MEDICINE

## 2019-09-16 PROCEDURE — G8427 DOCREV CUR MEDS BY ELIG CLIN: HCPCS | Performed by: INTERNAL MEDICINE

## 2019-09-16 PROCEDURE — G8598 ASA/ANTIPLAT THER USED: HCPCS | Performed by: INTERNAL MEDICINE

## 2019-09-16 PROCEDURE — G8419 CALC BMI OUT NRM PARAM NOF/U: HCPCS | Performed by: INTERNAL MEDICINE

## 2019-09-16 PROCEDURE — 1036F TOBACCO NON-USER: CPT | Performed by: INTERNAL MEDICINE

## 2019-09-16 PROCEDURE — 1123F ACP DISCUSS/DSCN MKR DOCD: CPT | Performed by: INTERNAL MEDICINE

## 2019-09-16 PROCEDURE — 1090F PRES/ABSN URINE INCON ASSESS: CPT | Performed by: INTERNAL MEDICINE

## 2019-09-17 NOTE — PROGRESS NOTES
dorzolamide-timolol (COSOPT) 22.3-6.8 MG/ML ophthalmic solution 1 drop 2 times daily      latanoprost (XALATAN) 0.005 % ophthalmic solution 1 drop nightly      aspirin 81 MG chewable tablet Take 1 tablet by mouth daily (Patient not taking: Reported on 9/16/2019) 30 tablet 3     No current facility-administered medications for this visit. Physical Exam:  /82   Pulse 90   Resp 18   Ht 5' 2\" (1.575 m)   Wt 152 lb 6.4 oz (69.1 kg)   BMI 27.87 kg/m²   Wt Readings from Last 3 Encounters:   09/16/19 152 lb 6.4 oz (69.1 kg)   08/23/19 155 lb (70.3 kg)   08/15/19 156 lb (70.8 kg)     Appearance: Awake, alert, no acute respiratory distress  Skin: Intact, no rash  Head: Normocephalic, atraumatic  Eyes: EOMI, no conjunctival erythema  ENMT: MMM, no rhinorrhea  Neck: Supple, no JVD  Lungs: Clear to auscultation bilaterally. No wheezes, rales, or rhonchi.   Cardiac: IRRR, no murmurs  Abdomen: Soft, +bowel sounds  Extremities: Moves all extremities x 4, no lower extremity edema  Neurologic: +right hand weakness, normal mood and affect    Laboratory Tests:  Lab Results   Component Value Date    CREATININE 0.8 08/23/2019    BUN 12 08/23/2019     08/23/2019    K 3.5 08/23/2019     08/23/2019    CO2 20 (L) 08/23/2019     Lab Results   Component Value Date    MG 2.0 08/23/2019     Lab Results   Component Value Date    ALT 14 08/15/2019    AST 16 08/15/2019    ALKPHOS 97 08/15/2019    BILITOT 1.0 08/15/2019     Lab Results   Component Value Date    WBC 8.1 08/23/2019    HGB 12.1 08/23/2019    HCT 36.7 08/23/2019    MCV 88.2 08/23/2019     08/23/2019     Lab Results   Component Value Date    TROPONINI <0.01 07/12/2019    TROPONINI <0.01 06/09/2019     Lab Results   Component Value Date    INR 1.2 08/22/2019    INR 1.8 08/09/2019    INR 1.1 06/09/2019    PROTIME 13.9 (H) 08/22/2019    PROTIME 20.4 (H) 08/09/2019    PROTIME 12.2 06/09/2019     Lab Results   Component Value Date    TSH 2.710 06/09/2019 Lab Results   Component Value Date    LABA1C 5.8 (H) 06/10/2019     Lab Results   Component Value Date    CHOL 244 (H) 06/10/2019     Lab Results   Component Value Date    TRIG 153 (H) 06/10/2019     Lab Results   Component Value Date    HDL 46 06/10/2019     Lab Results   Component Value Date    LDLCALC 167 (H) 06/10/2019     Lab Results   Component Value Date    LABVLDL 31 06/10/2019     Cardiac Tests:  ECG: rate controlled atrial fibrillation, NSSTT changes    Echocardiogram: 6/11/19 ([place])   Normal left ventricular systolic function.   Ejection fraction is visually estimated at 65%.   Normal right ventricular size and function (TAPSE 2.1 cm).   Indeterminate diastolic function.   Mild mitral regurgitation.   Mild-moderate aortic regurgitation.   Mild tricuspid regurgitation.   PASP is estimated at 32 mmHg (normal estimated PASP).    Lexiscan nuclear stress test: 7/18/19 [facility])  1. ECG during the infusion did not change. 2. The myocardial perfusion imaging was normal.    3. Overall left ventricular systolic function was normal without regional wall motion abnormalities. 4. Low risk  pharmacologic stress perfusion test.  5. Gated SPECT left ventricular ejection fraction was calculated to be 83%, with normal myocardial thickening and wall motion. Impression:  1. Persistent atrial fibrillation (duration unknown at the time of 6/2019 hospitalization, \"can't tell I'm out of rhythm) -- rate controlled, elevated [id number] score  2. CVA (6/2019)  3. Carotid artery disease (> 70% left-sided stenosis) -- s/p left CEA on 8/22/19  4. HTN -- controlled  5. HLD -- on statin     Plan:  1. Beta blocker added 6/10/19 -- continue  2. Continue eliquis 5 mg BID (added 6/10/19)  3. Continue statin  4. Continue current medications otherwise  5.  Results of recent echocardiogram and stress test reviewed with the patient and her daughter today    Jovani Villa MD  [facility]) Cardiology

## 2019-09-19 ENCOUNTER — TELEPHONE (OUTPATIENT)
Dept: VASCULAR SURGERY | Age: 79
End: 2019-09-19

## 2019-09-19 ENCOUNTER — HOSPITAL ENCOUNTER (OUTPATIENT)
Dept: ULTRASOUND IMAGING | Age: 79
Discharge: HOME | End: 2019-09-21
Payer: MEDICARE

## 2019-09-19 DIAGNOSIS — I65.22 STENOSIS OF LEFT CAROTID ARTERY: ICD-10-CM

## 2019-09-19 DIAGNOSIS — I65.22 STENOSIS OF LEFT CAROTID ARTERY: Primary | ICD-10-CM

## 2019-09-19 PROCEDURE — 93880 EXTRACRANIAL BILAT STUDY: CPT

## 2019-10-07 ENCOUNTER — OFFICE VISIT (OUTPATIENT)
Dept: NEUROLOGY | Age: 79
End: 2019-10-07
Payer: MEDICARE

## 2019-10-07 VITALS
SYSTOLIC BLOOD PRESSURE: 148 MMHG | HEART RATE: 102 BPM | RESPIRATION RATE: 17 BRPM | WEIGHT: 151 LBS | DIASTOLIC BLOOD PRESSURE: 81 MMHG | BODY MASS INDEX: 27.79 KG/M2 | HEIGHT: 62 IN | OXYGEN SATURATION: 98 %

## 2019-10-07 DIAGNOSIS — I63.9: Primary | ICD-10-CM

## 2019-10-07 PROCEDURE — G8400 PT W/DXA NO RESULTS DOC: HCPCS | Performed by: PHYSICIAN ASSISTANT

## 2019-10-07 PROCEDURE — 1090F PRES/ABSN URINE INCON ASSESS: CPT | Performed by: PHYSICIAN ASSISTANT

## 2019-10-07 PROCEDURE — G8419 CALC BMI OUT NRM PARAM NOF/U: HCPCS | Performed by: PHYSICIAN ASSISTANT

## 2019-10-07 PROCEDURE — G8427 DOCREV CUR MEDS BY ELIG CLIN: HCPCS | Performed by: PHYSICIAN ASSISTANT

## 2019-10-07 PROCEDURE — G8484 FLU IMMUNIZE NO ADMIN: HCPCS | Performed by: PHYSICIAN ASSISTANT

## 2019-10-07 PROCEDURE — 1036F TOBACCO NON-USER: CPT | Performed by: PHYSICIAN ASSISTANT

## 2019-10-07 PROCEDURE — 1123F ACP DISCUSS/DSCN MKR DOCD: CPT | Performed by: PHYSICIAN ASSISTANT

## 2019-10-07 PROCEDURE — 99214 OFFICE O/P EST MOD 30 MIN: CPT | Performed by: PHYSICIAN ASSISTANT

## 2019-10-07 PROCEDURE — 4040F PNEUMOC VAC/ADMIN/RCVD: CPT | Performed by: PHYSICIAN ASSISTANT

## 2019-10-07 PROCEDURE — G8598 ASA/ANTIPLAT THER USED: HCPCS | Performed by: PHYSICIAN ASSISTANT

## 2019-10-07 RX ORDER — CITALOPRAM 20 MG/1
TABLET ORAL
Refills: 0 | Status: ON HOLD | COMMUNITY
Start: 2019-09-27 | End: 2022-04-11 | Stop reason: ALTCHOICE

## 2019-10-07 RX ORDER — CLONAZEPAM 0.5 MG/1
TABLET ORAL
Refills: 0 | COMMUNITY
Start: 2019-09-27 | End: 2019-10-07

## 2019-10-11 ENCOUNTER — TELEPHONE (OUTPATIENT)
Dept: ADMINISTRATIVE | Age: 79
End: 2019-10-11

## 2020-05-08 ENCOUNTER — TELEPHONE (OUTPATIENT)
Dept: VASCULAR SURGERY | Age: 80
End: 2020-05-08

## 2020-05-08 ENCOUNTER — VIRTUAL VISIT (OUTPATIENT)
Dept: VASCULAR SURGERY | Age: 80
End: 2020-05-08
Payer: MEDICARE

## 2020-05-08 VITALS — WEIGHT: 140 LBS | BODY MASS INDEX: 26.43 KG/M2 | HEIGHT: 61 IN

## 2020-05-08 PROBLEM — I65.23 BILATERAL CAROTID ARTERY STENOSIS: Status: ACTIVE | Noted: 2019-08-22

## 2020-05-08 PROCEDURE — 99213 OFFICE O/P EST LOW 20 MIN: CPT | Performed by: PHYSICIAN ASSISTANT

## 2020-05-08 NOTE — PROGRESS NOTES
Lizzy Patel is a 78 y.o. female evaluated via telephone on 5/8/2020. Consent:  She and/or health care decision maker is aware that that she may receive a bill for this telephone service, depending on her insurance coverage, and has provided verbal consent to proceed: Yes      Documentation:  I communicated with the patient and/or health care decision maker about carotid artery stenosis s/p L CEA in 8/2019. Details of this discussion including any medical advice provided: Pt states the incision is well healed. Notes she has no new stroke like symptoms including unilateraly weakness, numbness, slurred speech, amaurosis fugax. Her sx from previous stroke have completely resolved. She remains very active. I discussed again the results of her 1 month post op US and discussed that we will call her as soon as we are able to do her 6 month carotid US in light of the coronavirus pandemic. We discussed signs and symptoms of stroke and instruction should these occur. I asked her to call with any issues but otherwise we will call her when we are able to schedule her testing and at that time we will schedule her annual follow-up. I affirm this is a Patient Initiated Episode with a Patient who has not had a related appointment within my department in the past 7 days or scheduled within the next 24 hours.     Patient identification was verified at the start of the visit: Yes    Total Time: minutes: 5-10 minutes    Note: not billable if this call serves to triage the patient into an appointment for the relevant concern      Demetrio Alejo PA-C

## 2020-06-11 ENCOUNTER — HOSPITAL ENCOUNTER (OUTPATIENT)
Dept: CARDIOLOGY | Age: 80
Discharge: HOME | End: 2020-06-11
Payer: MEDICARE

## 2020-06-11 PROCEDURE — 93880 EXTRACRANIAL BILAT STUDY: CPT

## 2021-07-28 ENCOUNTER — TELEPHONE (OUTPATIENT)
Dept: VASCULAR SURGERY | Age: 81
End: 2021-07-28

## 2021-07-28 NOTE — TELEPHONE ENCOUNTER
Spoke with pt's daughter, faxed order for carotid u/s to [facility] to be done prior to next ov with Dr. Galina Dumont.

## 2021-08-17 ENCOUNTER — TELEPHONE (OUTPATIENT)
Dept: VASCULAR SURGERY | Age: 81
End: 2021-08-17

## 2021-08-18 ENCOUNTER — OFFICE VISIT (OUTPATIENT)
Dept: VASCULAR SURGERY | Age: 81
End: 2021-08-18
Payer: MEDICARE

## 2021-08-18 VITALS — WEIGHT: 145 LBS | HEIGHT: 63 IN | BODY MASS INDEX: 25.69 KG/M2

## 2021-08-18 DIAGNOSIS — I65.23 CAROTID ARTERY STENOSIS, ASYMPTOMATIC, BILATERAL: Primary | ICD-10-CM

## 2021-08-18 PROCEDURE — 1090F PRES/ABSN URINE INCON ASSESS: CPT | Performed by: NURSE PRACTITIONER

## 2021-08-18 PROCEDURE — G8428 CUR MEDS NOT DOCUMENT: HCPCS | Performed by: NURSE PRACTITIONER

## 2021-08-18 PROCEDURE — G8400 PT W/DXA NO RESULTS DOC: HCPCS | Performed by: NURSE PRACTITIONER

## 2021-08-18 PROCEDURE — 1036F TOBACCO NON-USER: CPT | Performed by: NURSE PRACTITIONER

## 2021-08-18 PROCEDURE — G8419 CALC BMI OUT NRM PARAM NOF/U: HCPCS | Performed by: NURSE PRACTITIONER

## 2021-08-18 PROCEDURE — 99212 OFFICE O/P EST SF 10 MIN: CPT | Performed by: NURSE PRACTITIONER

## 2021-08-18 PROCEDURE — 4040F PNEUMOC VAC/ADMIN/RCVD: CPT | Performed by: NURSE PRACTITIONER

## 2021-08-18 PROCEDURE — 1123F ACP DISCUSS/DSCN MKR DOCD: CPT | Performed by: NURSE PRACTITIONER

## 2021-08-18 NOTE — PROGRESS NOTES
Vascular Surgery Outpatient Progress Note      Chief Complaint   Patient presents with    Circulatory Problem     stenosis of left carotid artery       HISTORY OF PRESENT ILLNESS:                The patient is a 78 y.o. female who returns for follow-up evaluation of carotid artery stenosis and L CEA. The patient denies any symptoms of stroke, mini stroke, and amaurosis fugax. She denies any recent hospital admission, major illness, or surgery since the last office visit. Past Medical History:        Diagnosis Date    Arthritis     Atrial fibrillation ([address]     Carotid stenosis, right     Cerebral artery occlusion with cerebral infarction (HCC)     MEMORY ISSUES, GAIT ISSUES    Glaucoma     History of blood transfusion 1953    rupture appendicitis    Hx of blood clots     developed in leg after appendectomy age 13 yrs     Hyperlipidemia     Hypertension     IBS (irritable bowel syndrome)     Nausea     Nosebleed 08/09/2019    in eor dr Sherif Hemphill left nostril packing    UTI (lower urinary tract infection)      Past Surgical History:        Procedure Laterality Date    APPENDECTOMY      CAROTID ENDARTERECTOMY Left 8/22/2019    LEFT CAROTID ENDARTERECTOMY performed by Bo Hawkins MD at [address] [place] [address] Bilateral     cataracts     Current Medications:   Prior to Admission medications    Medication Sig Start Date End Date Taking?  Authorizing Provider   citalopram (CELEXA) 20 MG tablet take 1 tablet by mouth once daily 9/27/19  Yes Historical Provider, MD   amLODIPine (NORVASC) 5 MG tablet Take 5 mg by mouth daily 8/30/19  Yes Historical Provider, MD   lisinopril (PRINIVIL;ZESTRIL) 20 MG tablet Take 20 mg by mouth daily 8/27/19  Yes Historical Provider, MD   promethazine (PHENERGAN) 50 MG/ML injection Infuse 50 mg intravenously every 6 hours as needed   Yes Historical Provider, MD   Cyanocobalamin (VITAMIN B 12 PO) Take by mouth   Yes Historical Provider, MD   apixaban (ELIQUIS) 5 MG TABS tablet Take 1 tablet by mouth 2 times daily 19  Yes Oneal Newton MD   atorvastatin (LIPITOR) 40 MG tablet Take 1 tablet by mouth nightly 19  Yes Oneal Newton MD   metoprolol succinate (TOPROL XL) 25 MG extended release tablet Take 1 tablet by mouth 2 times daily  Patient taking differently: Take 75 mg by mouth 2 times daily  19  Yes Oneal Newton MD   Probiotic Product (PROBIOTIC DAILY PO) Take 1 tablet by mouth   Yes Historical Provider, MD   dorzolamide-timolol (COSOPT) 22.3-6.8 MG/ML ophthalmic solution 1 drop 2 times daily   Yes Historical Provider, MD   latanoprost (XALATAN) 0.005 % ophthalmic solution 1 drop nightly   Yes Historical Provider, MD     Allergies:  Erythromycin    Social History     Socioeconomic History    Marital status:      Spouse name: Not on file    Number of children: Not on file    Years of education: Not on file    Highest education level: Not on file   Occupational History    Not on file   Tobacco Use    Smoking status: Former Smoker     Quit date: 1986     Years since quittin.1    Smokeless tobacco: Never Used   Vaping Use    Vaping Use: Never used   Substance and Sexual Activity    Alcohol use: No    Drug use: No    Sexual activity: Not on file   Other Topics Concern    Not on file   Social History Narrative    Not on file     Social Determinants of Health     Financial Resource Strain:     Difficulty of Paying Living Expenses:    Food Insecurity:     Worried About Running Out of Food in the Last Year:     [address] in the Last Year:    Transportation Needs:     Lack of Transportation (Medical):      Lack of Transportation (Non-Medical):    Physical Activity:     Days of Exercise per Week:     Minutes of Exercise per Session:    Stress:     Feeling of Stress :    Social Connections:     Frequency of Communication with Friends and Family:     Frequency of Social Gatherings with Friends and Family:     Attends Taoist Frequency/urgency:  No [x]/Yes []      Hematuria:    No [x]/Yes []                      Incontinence:    No [x]/Yes []    PHYSICAL EXAM:  There were no vitals filed for this visit. General Appearance: alert and oriented to person, place and time, in no acute distress, well developed and well- nourished  Neurologic: no cranial nerve deficit, speech normal  Head: normocephalic and atraumatic  Eyes: extraocular eye movements intact, conjunctivae normal  ENT: external ear and ear canal normal bilaterally, nose without deformity, no carotid bruits  Pulmonary/Chest: normal air movement, no respiratory distress  Cardiovascular: normal rate, irregular rhythm  Abdomen: non-distended, no masses  Musculoskeletal: no joint deformity or tenderness  Extremities: + slight leg edema bilaterally  Skin: warm and dry, no rash or erythema    PULSE EXAM      Right      Left   Brachial     Radial 2 2   Femoral     Popliteal     Dorsalis Pedis     Posterior Tibial     (3=normal, 2=diminished, 1=barely palpable, 4=widened)    RADIOLOGY:   Carotid ultrasound: <50% stenosis of the bilateral carotid arteries  Antegrade flow in the bilateral vertberal arteries     Problem List Items Addressed This Visit     None      Visit Diagnoses     Carotid artery stenosis, asymptomatic, bilateral    -  Primary    Relevant Orders    VL DUP CAROTID BILATERAL        I reviewed the results of the ultrasound with the patient and her daughter. Stable carotid artery stenosis and L CEA. I will plan to see her back in 1 year with another ultrasound. I asked her to call back sooner with any problems. Pt seen and plan reviewed with Dr. Robby Plunkett. ANTHONY Shen CNP      Return in about 1 year (around 8/18/2022).

## 2022-04-11 ENCOUNTER — HOSPITAL ENCOUNTER (INPATIENT)
Age: 82
LOS: 2 days | Discharge: HOME | DRG: 379 | End: 2022-04-13
Attending: INTERNAL MEDICINE | Admitting: INTERNAL MEDICINE
Payer: MEDICARE

## 2022-04-11 ENCOUNTER — APPOINTMENT (OUTPATIENT)
Dept: CT IMAGING | Age: 82
End: 2022-04-11
Payer: MEDICARE

## 2022-04-11 ENCOUNTER — HOSPITAL ENCOUNTER (EMERGENCY)
Age: 82
Discharge: ANOTHER HOSPITAL | End: 2022-04-11
Attending: EMERGENCY MEDICINE
Payer: MEDICARE

## 2022-04-11 ENCOUNTER — APPOINTMENT (OUTPATIENT)
Dept: GENERAL RADIOLOGY | Age: 82
End: 2022-04-11
Payer: MEDICARE

## 2022-04-11 VITALS
RESPIRATION RATE: 16 BRPM | BODY MASS INDEX: 25.51 KG/M2 | OXYGEN SATURATION: 96 % | WEIGHT: 144 LBS | TEMPERATURE: 96.9 F | SYSTOLIC BLOOD PRESSURE: 155 MMHG | DIASTOLIC BLOOD PRESSURE: 65 MMHG | HEART RATE: 72 BPM

## 2022-04-11 DIAGNOSIS — R53.1 GENERALIZED WEAKNESS: ICD-10-CM

## 2022-04-11 DIAGNOSIS — Z79.01 CHRONIC ANTICOAGULATION: ICD-10-CM

## 2022-04-11 DIAGNOSIS — K52.9 HEMORRHAGIC COLITIS: ICD-10-CM

## 2022-04-11 DIAGNOSIS — K92.2 GASTROINTESTINAL HEMORRHAGE, UNSPECIFIED GASTROINTESTINAL HEMORRHAGE TYPE: Primary | ICD-10-CM

## 2022-04-11 LAB
ALBUMIN SERPL-MCNC: 4.4 G/DL (ref 3.5–5.2)
ALP BLD-CCNC: 114 U/L (ref 35–104)
ALT SERPL-CCNC: 10 U/L (ref 0–32)
ANION GAP SERPL CALCULATED.3IONS-SCNC: 15 MMOL/L (ref 7–16)
APTT: 30.2 SEC (ref 24.5–35.1)
AST SERPL-CCNC: 18 U/L (ref 0–31)
BASOPHILS ABSOLUTE: 0.05 E9/L (ref 0–0.2)
BASOPHILS RELATIVE PERCENT: 0.5 % (ref 0–2)
BILIRUB SERPL-MCNC: 1.2 MG/DL (ref 0–1.2)
BUN BLDV-MCNC: 25 MG/DL (ref 6–23)
CALCIUM SERPL-MCNC: 9.3 MG/DL (ref 8.6–10.2)
CHLORIDE BLD-SCNC: 106 MMOL/L (ref 98–107)
CO2: 19 MMOL/L (ref 22–29)
CREAT SERPL-MCNC: 1.1 MG/DL (ref 0.5–1)
EKG ATRIAL RATE: 166 BPM
EKG Q-T INTERVAL: 166 MS
EKG QRS DURATION: 70 MS
EKG QTC CALCULATION (BAZETT): 186 MS
EKG R AXIS: 59 DEGREES
EKG T AXIS: 0 DEGREES
EKG VENTRICULAR RATE: 76 BPM
EOSINOPHILS ABSOLUTE: 0.05 E9/L (ref 0.05–0.5)
EOSINOPHILS RELATIVE PERCENT: 0.5 % (ref 0–6)
GFR AFRICAN AMERICAN: 57
GFR NON-AFRICAN AMERICAN: 47 ML/MIN/1.73
GLUCOSE BLD-MCNC: 166 MG/DL (ref 74–99)
HCT VFR BLD CALC: 40.7 % (ref 34–48)
HCT VFR BLD CALC: 42.5 % (ref 34–48)
HEMOGLOBIN: 13.4 G/DL (ref 11.5–15.5)
HEMOGLOBIN: 14.1 G/DL (ref 11.5–15.5)
IMMATURE GRANULOCYTES #: 0.07 E9/L
IMMATURE GRANULOCYTES %: 0.7 % (ref 0–5)
INR BLD: 1.8
LACTIC ACID: 1.2 MMOL/L (ref 0.5–2.2)
LACTIC ACID: 1.2 MMOL/L (ref 0.5–2.2)
LIPASE: 29 U/L (ref 13–60)
LYMPHOCYTES ABSOLUTE: 0.76 E9/L (ref 1.5–4)
LYMPHOCYTES RELATIVE PERCENT: 7.3 % (ref 20–42)
MAGNESIUM: 2.1 MG/DL (ref 1.6–2.6)
MCH RBC QN AUTO: 29.3 PG (ref 26–35)
MCHC RBC AUTO-ENTMCNC: 33.2 % (ref 32–34.5)
MCV RBC AUTO: 88.4 FL (ref 80–99.9)
MONOCYTES ABSOLUTE: 0.7 E9/L (ref 0.1–0.95)
MONOCYTES RELATIVE PERCENT: 6.7 % (ref 2–12)
NEUTROPHILS ABSOLUTE: 8.83 E9/L (ref 1.8–7.3)
NEUTROPHILS RELATIVE PERCENT: 84.3 % (ref 43–80)
PDW BLD-RTO: 14 FL (ref 11.5–15)
PLATELET # BLD: 175 E9/L (ref 130–450)
PMV BLD AUTO: 9.7 FL (ref 7–12)
POTASSIUM SERPL-SCNC: 4 MMOL/L (ref 3.5–5)
PRO-BNP: 1659 PG/ML (ref 0–450)
PROTHROMBIN TIME: 19.5 SEC (ref 9.3–12.4)
RBC # BLD: 4.81 E12/L (ref 3.5–5.5)
SODIUM BLD-SCNC: 140 MMOL/L (ref 132–146)
TOTAL PROTEIN: 7.8 G/DL (ref 6.4–8.3)
TROPONIN, HIGH SENSITIVITY: 12 NG/L (ref 0–9)
TROPONIN, HIGH SENSITIVITY: 14 NG/L (ref 0–9)
WBC # BLD: 10.5 E9/L (ref 4.5–11.5)

## 2022-04-11 PROCEDURE — 74177 CT ABD & PELVIS W/CONTRAST: CPT

## 2022-04-11 PROCEDURE — 85730 THROMBOPLASTIN TIME PARTIAL: CPT

## 2022-04-11 PROCEDURE — 87040 BLOOD CULTURE FOR BACTERIA: CPT

## 2022-04-11 PROCEDURE — 83880 ASSAY OF NATRIURETIC PEPTIDE: CPT

## 2022-04-11 PROCEDURE — 83605 ASSAY OF LACTIC ACID: CPT

## 2022-04-11 PROCEDURE — 83690 ASSAY OF LIPASE: CPT

## 2022-04-11 PROCEDURE — 6370000000 HC RX 637 (ALT 250 FOR IP): Performed by: FAMILY MEDICINE

## 2022-04-11 PROCEDURE — 85025 COMPLETE CBC W/AUTO DIFF WBC: CPT

## 2022-04-11 PROCEDURE — 36415 COLL VENOUS BLD VENIPUNCTURE: CPT

## 2022-04-11 PROCEDURE — 96365 THER/PROPH/DIAG IV INF INIT: CPT

## 2022-04-11 PROCEDURE — 93005 ELECTROCARDIOGRAM TRACING: CPT | Performed by: EMERGENCY MEDICINE

## 2022-04-11 PROCEDURE — 6360000002 HC RX W HCPCS: Performed by: EMERGENCY MEDICINE

## 2022-04-11 PROCEDURE — 2500000003 HC RX 250 WO HCPCS: Performed by: EMERGENCY MEDICINE

## 2022-04-11 PROCEDURE — 6360000004 HC RX CONTRAST MEDICATION: Performed by: RADIOLOGY

## 2022-04-11 PROCEDURE — C9113 INJ PANTOPRAZOLE SODIUM, VIA: HCPCS | Performed by: EMERGENCY MEDICINE

## 2022-04-11 PROCEDURE — 80053 COMPREHEN METABOLIC PANEL: CPT

## 2022-04-11 PROCEDURE — 71045 X-RAY EXAM CHEST 1 VIEW: CPT

## 2022-04-11 PROCEDURE — 84484 ASSAY OF TROPONIN QUANT: CPT

## 2022-04-11 PROCEDURE — 2580000003 HC RX 258: Performed by: FAMILY MEDICINE

## 2022-04-11 PROCEDURE — 85610 PROTHROMBIN TIME: CPT

## 2022-04-11 PROCEDURE — 85018 HEMOGLOBIN: CPT

## 2022-04-11 PROCEDURE — 83735 ASSAY OF MAGNESIUM: CPT

## 2022-04-11 PROCEDURE — 99285 EMERGENCY DEPT VISIT HI MDM: CPT

## 2022-04-11 PROCEDURE — 96375 TX/PRO/DX INJ NEW DRUG ADDON: CPT

## 2022-04-11 PROCEDURE — 2060000000 HC ICU INTERMEDIATE R&B

## 2022-04-11 PROCEDURE — 2580000003 HC RX 258: Performed by: EMERGENCY MEDICINE

## 2022-04-11 PROCEDURE — C9113 INJ PANTOPRAZOLE SODIUM, VIA: HCPCS | Performed by: FAMILY MEDICINE

## 2022-04-11 PROCEDURE — 85014 HEMATOCRIT: CPT

## 2022-04-11 PROCEDURE — 6360000002 HC RX W HCPCS: Performed by: FAMILY MEDICINE

## 2022-04-11 RX ORDER — ALPRAZOLAM 0.5 MG/1
0.5 TABLET ORAL NIGHTLY PRN
COMMUNITY

## 2022-04-11 RX ORDER — ACETAMINOPHEN 325 MG/1
650 TABLET ORAL EVERY 6 HOURS PRN
Status: DISCONTINUED | OUTPATIENT
Start: 2022-04-11 | End: 2022-04-13 | Stop reason: HOSPADM

## 2022-04-11 RX ORDER — ATORVASTATIN CALCIUM 40 MG/1
40 TABLET, FILM COATED ORAL NIGHTLY
Status: DISCONTINUED | OUTPATIENT
Start: 2022-04-11 | End: 2022-04-13 | Stop reason: HOSPADM

## 2022-04-11 RX ORDER — METOPROLOL SUCCINATE 25 MG/1
25 TABLET, EXTENDED RELEASE ORAL 2 TIMES DAILY
Status: DISCONTINUED | OUTPATIENT
Start: 2022-04-11 | End: 2022-04-13 | Stop reason: HOSPADM

## 2022-04-11 RX ORDER — PANTOPRAZOLE SODIUM 40 MG/10ML
80 INJECTION, POWDER, LYOPHILIZED, FOR SOLUTION INTRAVENOUS ONCE
Status: COMPLETED | OUTPATIENT
Start: 2022-04-11 | End: 2022-04-11

## 2022-04-11 RX ORDER — SODIUM CHLORIDE 9 MG/ML
INJECTION, SOLUTION INTRAVENOUS CONTINUOUS
Status: DISCONTINUED | OUTPATIENT
Start: 2022-04-11 | End: 2022-04-13 | Stop reason: HOSPADM

## 2022-04-11 RX ORDER — ACETAMINOPHEN 650 MG/1
650 SUPPOSITORY RECTAL EVERY 6 HOURS PRN
Status: DISCONTINUED | OUTPATIENT
Start: 2022-04-11 | End: 2022-04-13 | Stop reason: HOSPADM

## 2022-04-11 RX ORDER — AMLODIPINE BESYLATE 5 MG/1
5 TABLET ORAL NIGHTLY
Status: DISCONTINUED | OUTPATIENT
Start: 2022-04-11 | End: 2022-04-13 | Stop reason: HOSPADM

## 2022-04-11 RX ORDER — DORZOLAMIDE HCL 20 MG/ML
1 SOLUTION/ DROPS OPHTHALMIC 3 TIMES DAILY
Status: DISCONTINUED | OUTPATIENT
Start: 2022-04-11 | End: 2022-04-13 | Stop reason: HOSPADM

## 2022-04-11 RX ORDER — ONDANSETRON 4 MG/1
4 TABLET, ORALLY DISINTEGRATING ORAL EVERY 8 HOURS PRN
Status: DISCONTINUED | OUTPATIENT
Start: 2022-04-11 | End: 2022-04-13 | Stop reason: HOSPADM

## 2022-04-11 RX ORDER — TIMOLOL MALEATE 5 MG/ML
1 SOLUTION/ DROPS OPHTHALMIC 2 TIMES DAILY
Status: DISCONTINUED | OUTPATIENT
Start: 2022-04-11 | End: 2022-04-13 | Stop reason: HOSPADM

## 2022-04-11 RX ORDER — SODIUM CHLORIDE 9 MG/ML
INJECTION, SOLUTION INTRAVENOUS PRN
Status: DISCONTINUED | OUTPATIENT
Start: 2022-04-11 | End: 2022-04-13 | Stop reason: HOSPADM

## 2022-04-11 RX ORDER — LATANOPROST 50 UG/ML
1 SOLUTION/ DROPS OPHTHALMIC NIGHTLY
Status: DISCONTINUED | OUTPATIENT
Start: 2022-04-11 | End: 2022-04-13 | Stop reason: HOSPADM

## 2022-04-11 RX ORDER — LISINOPRIL 20 MG/1
20 TABLET ORAL DAILY
Status: DISCONTINUED | OUTPATIENT
Start: 2022-04-11 | End: 2022-04-13 | Stop reason: HOSPADM

## 2022-04-11 RX ORDER — DORZOLAMIDE HYDROCHLORIDE AND TIMOLOL MALEATE 20; 5 MG/ML; MG/ML
1 SOLUTION/ DROPS OPHTHALMIC 2 TIMES DAILY
Status: DISCONTINUED | OUTPATIENT
Start: 2022-04-11 | End: 2022-04-11 | Stop reason: CLARIF

## 2022-04-11 RX ORDER — ALPRAZOLAM 0.25 MG/1
0.5 TABLET ORAL NIGHTLY PRN
Status: DISCONTINUED | OUTPATIENT
Start: 2022-04-11 | End: 2022-04-13 | Stop reason: HOSPADM

## 2022-04-11 RX ORDER — PROMETHAZINE HYDROCHLORIDE 25 MG/ML
12.5 INJECTION, SOLUTION INTRAMUSCULAR; INTRAVENOUS ONCE
Status: COMPLETED | OUTPATIENT
Start: 2022-04-11 | End: 2022-04-11

## 2022-04-11 RX ORDER — SODIUM CHLORIDE 9 MG/ML
INJECTION, SOLUTION INTRAVENOUS CONTINUOUS
Status: DISCONTINUED | OUTPATIENT
Start: 2022-04-11 | End: 2022-04-11 | Stop reason: HOSPADM

## 2022-04-11 RX ORDER — SODIUM CHLORIDE 0.9 % (FLUSH) 0.9 %
5-40 SYRINGE (ML) INJECTION EVERY 12 HOURS SCHEDULED
Status: DISCONTINUED | OUTPATIENT
Start: 2022-04-11 | End: 2022-04-13 | Stop reason: HOSPADM

## 2022-04-11 RX ORDER — SODIUM CHLORIDE 0.9 % (FLUSH) 0.9 %
5-40 SYRINGE (ML) INJECTION PRN
Status: DISCONTINUED | OUTPATIENT
Start: 2022-04-11 | End: 2022-04-13 | Stop reason: HOSPADM

## 2022-04-11 RX ORDER — PROMETHAZINE HYDROCHLORIDE 25 MG/1
25 TABLET ORAL EVERY 6 HOURS PRN
COMMUNITY

## 2022-04-11 RX ORDER — ONDANSETRON 2 MG/ML
4 INJECTION INTRAMUSCULAR; INTRAVENOUS EVERY 6 HOURS PRN
Status: DISCONTINUED | OUTPATIENT
Start: 2022-04-11 | End: 2022-04-13 | Stop reason: HOSPADM

## 2022-04-11 RX ADMIN — METRONIDAZOLE 500 MG: 500 INJECTION, SOLUTION INTRAVENOUS at 11:48

## 2022-04-11 RX ADMIN — SODIUM CHLORIDE, PRESERVATIVE FREE 10 ML: 5 INJECTION INTRAVENOUS at 21:04

## 2022-04-11 RX ADMIN — SODIUM CHLORIDE: 9 INJECTION, SOLUTION INTRAVENOUS at 16:59

## 2022-04-11 RX ADMIN — LISINOPRIL 20 MG: 20 TABLET ORAL at 17:07

## 2022-04-11 RX ADMIN — PROMETHAZINE HYDROCHLORIDE 12.5 MG: 25 INJECTION INTRAMUSCULAR; INTRAVENOUS at 18:57

## 2022-04-11 RX ADMIN — IOPAMIDOL 75 ML: 755 INJECTION, SOLUTION INTRAVENOUS at 09:48

## 2022-04-11 RX ADMIN — ATORVASTATIN CALCIUM 40 MG: 40 TABLET, FILM COATED ORAL at 21:04

## 2022-04-11 RX ADMIN — AMLODIPINE BESYLATE 5 MG: 5 TABLET ORAL at 21:04

## 2022-04-11 RX ADMIN — SODIUM CHLORIDE, PRESERVATIVE FREE 40 MG: 5 INJECTION INTRAVENOUS at 16:59

## 2022-04-11 RX ADMIN — CEFTRIAXONE 2000 MG: 2 INJECTION, POWDER, FOR SOLUTION INTRAMUSCULAR; INTRAVENOUS at 11:39

## 2022-04-11 RX ADMIN — DORZOLAMIDE HYDROCHLORIDE 1 DROP: 20 SOLUTION/ DROPS OPHTHALMIC at 17:13

## 2022-04-11 RX ADMIN — LATANOPROST 1 DROP: 50 SOLUTION OPHTHALMIC at 21:04

## 2022-04-11 RX ADMIN — PANTOPRAZOLE SODIUM 80 MG: 40 INJECTION, POWDER, FOR SOLUTION INTRAVENOUS at 08:20

## 2022-04-11 RX ADMIN — ONDANSETRON 4 MG: 2 INJECTION INTRAMUSCULAR; INTRAVENOUS at 16:59

## 2022-04-11 RX ADMIN — METOPROLOL SUCCINATE 25 MG: 25 TABLET, EXTENDED RELEASE ORAL at 21:04

## 2022-04-11 RX ADMIN — TIMOLOL MALEATE 1 DROP: 5 SOLUTION OPHTHALMIC at 21:04

## 2022-04-11 RX ADMIN — SODIUM CHLORIDE: 9 INJECTION, SOLUTION INTRAVENOUS at 08:09

## 2022-04-11 RX ADMIN — DORZOLAMIDE HYDROCHLORIDE 1 DROP: 20 SOLUTION/ DROPS OPHTHALMIC at 21:04

## 2022-04-11 ASSESSMENT — PAIN DESCRIPTION - PROGRESSION: CLINICAL_PROGRESSION: NOT CHANGED

## 2022-04-11 ASSESSMENT — PAIN DESCRIPTION - PAIN TYPE: TYPE: ACUTE PAIN

## 2022-04-11 ASSESSMENT — PAIN DESCRIPTION - FREQUENCY: FREQUENCY: CONTINUOUS

## 2022-04-11 ASSESSMENT — PAIN DESCRIPTION - ONSET: ONSET: ON-GOING

## 2022-04-11 ASSESSMENT — PAIN SCALES - GENERAL
PAINLEVEL_OUTOF10: 0
PAINLEVEL_OUTOF10: 3
PAINLEVEL_OUTOF10: 0

## 2022-04-11 ASSESSMENT — PAIN DESCRIPTION - ORIENTATION: ORIENTATION: RIGHT;LEFT;LOWER

## 2022-04-11 ASSESSMENT — PAIN DESCRIPTION - DESCRIPTORS: DESCRIPTORS: ACHING;DULL

## 2022-04-11 ASSESSMENT — PAIN DESCRIPTION - LOCATION: LOCATION: ABDOMEN

## 2022-04-11 NOTE — ED NOTES
Alem Vasquez removed for transfer to accepting facility, Pt cleansed and new chux applied for transfer, no active bleeding noted.      Amira Stevens RN  04/11/22 9576

## 2022-04-11 NOTE — ED NOTES
IV NSS  Infusing via th epump at 150ml/hr and was changed over to dial a flow @ 150ml/hr for transfer to [facility]  4th floor room [address], [facility]  04/11/22 5931
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
## 2022-04-11 NOTE — ED NOTES
Transfer authorization signed by Estefani Stover and wittnessed by Chris Doss, BETITO  04/11/22 6948

## 2022-04-11 NOTE — CONSULTS
GENERAL SURGERY  CONSULT NOTE  4/11/2022    Physician Consulted: Dr. Chris Rubio   Reason for Consult: GIB      HPI  Nicholas Irene is a 78 y.o. female who presents for evaluation of GIB. Patient complains of N/V and bright red blood per rectum. She states that this all started yesterday. She currently is on oral anticoagulation secondary to history of A. Fib. Hgb is 14 and she has otherwise been hemodynamically stable. Patient and family admit that she has dealt with chronic nausea for many years and has been taking medication for it. She denies having a colonoscopy or EGD before. Pt ahs a significant past medical history of stroke, a fib, Carotid arterial diease s/p CEA and IBS       Past Medical History:   Diagnosis Date    Arthritis     Atrial fibrillation (HCC)     Carotid stenosis, right     Cerebral artery occlusion with cerebral infarction ([facility] [address]     MEMORY ISSUES, GAIT ISSUES    Glaucoma     History of blood transfusion 1953    rupture appendicitis    Hx of blood clots     developed in leg after appendectomy age 13 yrs     Hyperlipidemia     Hypertension     IBS (irritable bowel syndrome)     Nausea     Nosebleed 08/09/2019    in eor dr Annabelle Muñoz left nostril packing    UTI (lower urinary tract infection)        Past Surgical History:   Procedure Laterality Date    APPENDECTOMY      CAROTID ENDARTERECTOMY Left 8/22/2019    LEFT CAROTID ENDARTERECTOMY performed by Oleksandr Beach MD at [facility] Bilateral     cataracts       Medications Prior to Admission:    Prior to Admission medications    Medication Sig Start Date End Date Taking? Authorizing Provider   promethazine (PHENERGAN) 25 MG tablet Take 25 mg by mouth every 6 hours as needed for Nausea   Yes Historical Provider, MD   ALPRAZolam (XANAX) 0.5 MG tablet Take 0.5 mg by mouth nightly as needed for Sleep.     Historical Provider, MD   amLODIPine (NORVASC) 5 MG tablet Take 5 mg by mouth at bedtime  8/30/19   Historical Provider, MD lisinopril (PRINIVIL;ZESTRIL) 20 MG tablet Take 20 mg by mouth daily 19   Historical Provider, MD   promethazine (PHENERGAN) 50 MG/ML injection Infuse 50 mg intravenously every 6 hours as needed    Historical Provider, MD   Cyanocobalamin (VITAMIN B 12 PO) Take by mouth    Historical Provider, MD   apixaban (ELIQUIS) 5 MG TABS tablet Take 1 tablet by mouth 2 times daily 19   Lieutenant Caryl MD   atorvastatin (LIPITOR) 40 MG tablet Take 1 tablet by mouth nightly  Patient taking differently: Take 40 mg by mouth at bedtime  19   Lieutenant Caryl MD   metoprolol succinate (TOPROL XL) 25 MG extended release tablet Take 1 tablet by mouth 2 times daily  Patient taking differently: Take 50 mg by mouth 2 times daily  19   Lieutenant Caryl MD   Probiotic Product (PROBIOTIC DAILY PO) Take 1 tablet by mouth    Historical Provider, MD   dorzolamide-timolol (COSOPT) 22.3-6.8 MG/ML ophthalmic solution 1 drop 2 times daily    Historical Provider, MD   latanoprost (XALATAN) 0.005 % ophthalmic solution 1 drop nightly    Historical Provider, MD       Allergies   Allergen Reactions    Erythromycin Nausea And Vomiting       Family History   Problem Relation Age of Onset    Other Mother 80        heart failure     Other Father         multiple myeloma        Social History     Tobacco Use    Smoking status: Former Smoker     Quit date: 1986     Years since quittin.7    Smokeless tobacco: Never Used   Vaping Use    Vaping Use: Never used   Substance Use Topics    Alcohol use: No    Drug use: No         Review of Systems   Patient is mildly altered 2/2 to history of a stroke. Chart was checked       PHYSICAL EXAM:    Vitals:    22 1515   BP: (!) 149/67   Pulse: 80   Resp: 18   Temp: 98.3 °F (36.8 °C)   SpO2: 98%       General Appearance:  awake, alert, in no acute distress  Skin:  Skin color, texture, turgor normal. No rashes or lesions.   Head/face:  NCAT  Eyes:  PERRL  Lungs:  No chest wall tenderness. Heart:  Heart regular rate and rhythm  Abdomen:  Soft, non-tender, normal bowel sounds. No bruits, organomegaly or masses. Extremities: pulses present in all extremities  Female Rectal: FOBT +, normal rectal tone, no masses. LABS:    CBC  Recent Labs     04/11/22  0820   WBC 10.5   HGB 14.1   HCT 42.5        BMP  Recent Labs     04/11/22 0820      K 4.0      CO2 19*   BUN 25*   CREATININE 1.1*   CALCIUM 9.3     Liver Function  Recent Labs     04/11/22 0820   LIPASE 29   BILITOT 1.2   AST 18   ALT 10   ALKPHOS 114*   PROT 7.8   LABALBU 4.4     No results for input(s): LACTATE in the last 72 hours. Recent Labs     04/11/22 0820   INR 1.8       RADIOLOGY    CT ABDOMEN PELVIS W IV CONTRAST Additional Contrast? None    Result Date: 4/11/2022  EXAMINATION: CT OF THE ABDOMEN AND PELVIS WITH CONTRAST 4/11/2022 9:22 am TECHNIQUE: CT of the abdomen and pelvis was performed with the administration of intravenous contrast. Multiplanar reformatted images are provided for review. Dose modulation, iterative reconstruction, and/or weight based adjustment of the mA/kV was utilized to reduce the radiation dose to as low as reasonably achievable. COMPARISON: None. HISTORY: ORDERING SYSTEM PROVIDED HISTORY: LUQ, LLQ pain, gi bleeding TECHNOLOGIST PROVIDED HISTORY: Reason for exam:->LUQ, LLQ pain, gi bleeding Additional Contrast?->None Decision Support Exception - unselect if not a suspected or confirmed emergency medical condition->Emergency Medical Condition (MA) FINDINGS: Lower Chest: Lung bases appear generally clear. Minimal dependent hypo stasis and linear atelectasis or scarring. Heart is upper limits of normal in size with prominent atria. Sliding-type hiatal hernia. Organs: Liver is normal in size without focal lesion. Gallbladder bile ducts and pancreas appear normal.  Spleen is normal in size.   There is a 9 mm rounded calcific density in the splenic hilum which could represent a tiny splenic artery aneurysm. The adrenal glands and kidneys are within normal limits. GI/Bowel: The colon is decompressed. There is wall thickening and surrounding inflammation involving the splenic flexure to the level of the sigmoid colon. Findings are compatible with colitis including infection, inflammatory or ischemic. Contrast material is present within the inferior mesenteric artery near its origin. Distal NILO not well visualized. No bowel obstruction detected. Pelvis: Uterus appears normal.  Urinary bladder is moderately distended. No pelvic free fluid or abscess. 2.6 cm bilobed low-density lesion involving the right ovary, simple appearing cyst.  No further imaging evaluation recommended. Peritoneum/Retroperitoneum: There is ASVD of the abdominal aorta without evidence of aneurysm. Bones/Soft Tissues: Diffuse osteopenia with grade 1 anterolisthesis of L5 on S1 due to bilateral spondylolysis. Mild multilevel facet arthropathy. Vacuum disc change at L3-4. No acute fracture. No lytic or blastic lesions detected. Mild degenerative changes in the hips, right greater than left. Tiny fat containing umbilical hernia. 1.  Wall thickening and inflammation involving the left colon most prominent at the left colon/sigmoid junction. No evidence of abscess. No free air. Findings compatible with colitis. 2.  Simple appearing 2.6 cm right ovarian cyst, no further imaging evaluation required. XR CHEST PORTABLE    Result Date: 4/11/2022  EXAMINATION: ONE XRAY VIEW OF THE CHEST 4/11/2022 8:25 am COMPARISON: 07/12/2019 HISTORY: ORDERING SYSTEM PROVIDED HISTORY: abd pain TECHNOLOGIST PROVIDED HISTORY: Reason for exam:->abd pain FINDINGS: The lungs are without acute focal process. There is no effusion or pneumothorax. The cardiomediastinal silhouette is without acute process. The osseous structures are without acute process. No acute process.          ASSESSMENT:  78 y.o. female with Bright Red Blood per rectum     PLAN:  Okay for CLD  Hold anticoagulation   Patient and family do not want a colonoscopy or EGD  No plans for endoscopy at this time   Continue to monitor H/H  Continue PPI  Anti-nausea control        Electronically signed by Fely Puente DO on 4/11/22 at 4:45 PM EDT    The patient was seen and examined and the chart was reviewed. I agree with the assessment and plan. The patient will forego any endoscopic evaluation. The patient's hemoglobin and hematocrit will be monitored.

## 2022-04-11 NOTE — ED NOTES
Report was called to [address] [place] [address] at [facility] room 447-A  Without any questions. Waiting transfer to facility with approximate time of 1 Hr for transfer.   No rectal bleeding noted at this time and none c/o     Velvet Mccoy RN  04/11/22 2349

## 2022-04-12 LAB
ANION GAP SERPL CALCULATED.3IONS-SCNC: 13 MMOL/L (ref 7–16)
BUN BLDV-MCNC: 14 MG/DL (ref 6–23)
CALCIUM SERPL-MCNC: 8.5 MG/DL (ref 8.6–10.2)
CHLORIDE BLD-SCNC: 104 MMOL/L (ref 98–107)
CO2: 18 MMOL/L (ref 22–29)
CREAT SERPL-MCNC: 0.8 MG/DL (ref 0.5–1)
GFR AFRICAN AMERICAN: >60
GFR NON-AFRICAN AMERICAN: >60 ML/MIN/1.73
GLUCOSE BLD-MCNC: 116 MG/DL (ref 74–99)
HCT VFR BLD CALC: 30.9 % (ref 34–48)
HCT VFR BLD CALC: 32.5 % (ref 34–48)
HCT VFR BLD CALC: 41 % (ref 34–48)
HCT VFR BLD CALC: 41.9 % (ref 34–48)
HEMOGLOBIN: 10 G/DL (ref 11.5–15.5)
HEMOGLOBIN: 10.6 G/DL (ref 11.5–15.5)
HEMOGLOBIN: 13.7 G/DL (ref 11.5–15.5)
HEMOGLOBIN: 13.8 G/DL (ref 11.5–15.5)
IRON SATURATION: 20 % (ref 15–50)
IRON: 56 MCG/DL (ref 37–145)
MAGNESIUM: 1.9 MG/DL (ref 1.6–2.6)
POTASSIUM REFLEX MAGNESIUM: 3.1 MMOL/L (ref 3.5–5)
SODIUM BLD-SCNC: 135 MMOL/L (ref 132–146)
TOTAL IRON BINDING CAPACITY: 280 MCG/DL (ref 250–450)

## 2022-04-12 PROCEDURE — 6370000000 HC RX 637 (ALT 250 FOR IP): Performed by: NURSE PRACTITIONER

## 2022-04-12 PROCEDURE — 83550 IRON BINDING TEST: CPT

## 2022-04-12 PROCEDURE — 2580000003 HC RX 258: Performed by: FAMILY MEDICINE

## 2022-04-12 PROCEDURE — 2060000000 HC ICU INTERMEDIATE R&B

## 2022-04-12 PROCEDURE — 83540 ASSAY OF IRON: CPT

## 2022-04-12 PROCEDURE — 80048 BASIC METABOLIC PNL TOTAL CA: CPT

## 2022-04-12 PROCEDURE — 85014 HEMATOCRIT: CPT

## 2022-04-12 PROCEDURE — 6370000000 HC RX 637 (ALT 250 FOR IP): Performed by: FAMILY MEDICINE

## 2022-04-12 PROCEDURE — A4216 STERILE WATER/SALINE, 10 ML: HCPCS | Performed by: FAMILY MEDICINE

## 2022-04-12 PROCEDURE — 85018 HEMOGLOBIN: CPT

## 2022-04-12 PROCEDURE — 6360000002 HC RX W HCPCS: Performed by: FAMILY MEDICINE

## 2022-04-12 PROCEDURE — 36415 COLL VENOUS BLD VENIPUNCTURE: CPT

## 2022-04-12 PROCEDURE — 83735 ASSAY OF MAGNESIUM: CPT

## 2022-04-12 PROCEDURE — C9113 INJ PANTOPRAZOLE SODIUM, VIA: HCPCS | Performed by: FAMILY MEDICINE

## 2022-04-12 RX ORDER — POTASSIUM CHLORIDE 20 MEQ/1
40 TABLET, EXTENDED RELEASE ORAL ONCE
Status: COMPLETED | OUTPATIENT
Start: 2022-04-12 | End: 2022-04-12

## 2022-04-12 RX ADMIN — DORZOLAMIDE HYDROCHLORIDE 1 DROP: 20 SOLUTION/ DROPS OPHTHALMIC at 09:34

## 2022-04-12 RX ADMIN — LATANOPROST 1 DROP: 50 SOLUTION OPHTHALMIC at 20:30

## 2022-04-12 RX ADMIN — SODIUM CHLORIDE, PRESERVATIVE FREE 10 ML: 5 INJECTION INTRAVENOUS at 20:30

## 2022-04-12 RX ADMIN — POTASSIUM CHLORIDE 40 MEQ: 20 TABLET, EXTENDED RELEASE ORAL at 09:30

## 2022-04-12 RX ADMIN — AMLODIPINE BESYLATE 5 MG: 5 TABLET ORAL at 20:30

## 2022-04-12 RX ADMIN — METOPROLOL SUCCINATE 25 MG: 25 TABLET, EXTENDED RELEASE ORAL at 20:31

## 2022-04-12 RX ADMIN — SODIUM CHLORIDE, PRESERVATIVE FREE 10 ML: 5 INJECTION INTRAVENOUS at 09:36

## 2022-04-12 RX ADMIN — ATORVASTATIN CALCIUM 40 MG: 40 TABLET, FILM COATED ORAL at 20:31

## 2022-04-12 RX ADMIN — TIMOLOL MALEATE 1 DROP: 5 SOLUTION OPHTHALMIC at 09:34

## 2022-04-12 RX ADMIN — SODIUM CHLORIDE: 9 INJECTION, SOLUTION INTRAVENOUS at 15:21

## 2022-04-12 RX ADMIN — TIMOLOL MALEATE 1 DROP: 5 SOLUTION OPHTHALMIC at 20:30

## 2022-04-12 RX ADMIN — SODIUM CHLORIDE, PRESERVATIVE FREE 40 MG: 5 INJECTION INTRAVENOUS at 09:31

## 2022-04-12 RX ADMIN — DORZOLAMIDE HYDROCHLORIDE 1 DROP: 20 SOLUTION/ DROPS OPHTHALMIC at 20:30

## 2022-04-12 ASSESSMENT — PAIN SCALES - GENERAL
PAINLEVEL_OUTOF10: 0

## 2022-04-12 NOTE — H&P
General appearance: NAD, conversant, fatigued  Eyes: Sclerae anicteric, PERRLA  HEENT: AT/NC, MMM  Neck: FROM, supple, no thyromegaly  Lymph: No cervical / supraclavicular lymphadenopathy  Lungs: Clear to auscultation, WOB normal  CV: Irregular, no MRGs, no lower extremity edema  Abdomen: Soft, non-tender; no masses or HSM, +BS  Extremities: FROM without synovitis. No clubbing or cyanosis of the hands. Skin: no rash, induration, lesions, or ulcers  Psych: Calm and cooperative. Normal judgement and insight. Normal mood and affect. Neuro: Alert and interactive, face symmetric, speech fluent. LABS:  All labs reviewed. Of note:  CBC with Differential:    Lab Results   Component Value Date    WBC 10.5 04/11/2022    RBC 4.81 04/11/2022    HGB 13.8 04/12/2022    HCT 41.0 04/12/2022     04/11/2022    MCV 88.4 04/11/2022    MCH 29.3 04/11/2022    MCHC 33.2 04/11/2022    RDW 14.0 04/11/2022    LYMPHOPCT 7.3 04/11/2022    MONOPCT 6.7 04/11/2022    BASOPCT 0.5 04/11/2022    MONOSABS 0.70 04/11/2022    LYMPHSABS 0.76 04/11/2022    EOSABS 0.05 04/11/2022    BASOSABS 0.05 04/11/2022     CMP:    Lab Results   Component Value Date     04/12/2022    K 3.1 04/12/2022     04/12/2022    CO2 18 04/12/2022    BUN 14 04/12/2022    CREATININE 0.8 04/12/2022    GFRAA >60 04/12/2022    LABGLOM >60 04/12/2022    GLUCOSE 116 04/12/2022    PROT 7.8 04/11/2022    LABALBU 4.4 04/11/2022    CALCIUM 8.5 04/12/2022    BILITOT 1.2 04/11/2022    ALKPHOS 114 04/11/2022    AST 18 04/11/2022    ALT 10 04/11/2022       Imaging:  CXR: No acute process. CT abdomen pelvis: Wall thickening and inflammation involving the left colon most prominent at the left colon/sigmoid junction. No evidence of abscess. No free air. Findings compatible with colitis. Simple appearing 2.6 cm right ovarian cyst, no further imaging evaluation required.     EKG:  A. fib    Telemetry:  A. fib    ASSESSMENT/PLAN:  Principal Problem:    GI bleed  Resolved Problems:    * No resolved hospital problems. *    41-year-old female with a history of A. fib / Eliquis began having bright red bloody bowel movements is admitted to telemetry unit with    GI bleed  -Telemetry monitoring  -H&H every 6 Hours transfuse as needed to keep hemoglobin greater than 7  -Hemoglobin 13.7  -CLD advance to full's  - IV hydration  -Hold all blood thinners.  -Consult general surgery -appreciate input  -No plans for endoscopy at this time      DVT prophylaxis-PCD's  PT OT  Discharge planning  Case discussed with attending and agreed upon plan of care. Code status: Full  Requires inpatient level of care  ANTHONY Briscoe CNP    8:04 AM  4/12/2022     Above note edited to reflect my thoughts   Family does not wish to have panendoscopy  He would like to monitor  Explained to them if hemoglobin continues to drop, would be best to do procedure to see site of bleed  Will recheck H&H tomorrow-currently down to 10 from 13 if significantly lower family can decide whether they would like to pursue scopes or change patient's CODE STATUS to DNR  Upon discussion with daughter at bedside, it appears they are leaning more towards DNR status    I personally saw, examined and provided care for the patient. Radiographs, labs and medication list were reviewed by me independently. The case was discussed in detail and plans for care were established. Review of Gabby PURI CNP, documentation was conducted and revisions were made as appropriate directly by me. I agree with the above documented exam, problem list, and plan of care.      Ria Alexis MD  8:26 PM  4/12/2022

## 2022-04-12 NOTE — CARE COORDINATION
CASE MANAGEMENT. .. Chart reviewed. Met with patient and her daughter, Issa Leyva, at the bedside. Ms Florentin Dawn is independent from home She and Issa Leyva live together. Uses a cane on rare occasions. Has h/o CVA with gait and memory issues. Has history with [facility]. No PENG history. Follows with Dr Bianca Sheridan. Ms Florentin Dawn states that she continues with bright red rectal bleeding. Is still not interested in scope at this time. Is hopeful that the bleeding will \"stop on its own\". H/H remains stable. Eliquis from home is on hold. Continues on ivf, iv protonix and clear liquids. Per conversation with Dr Ab Rodrigez, pending progress, patient may be ready for discharge tomorrow. Plan is home at discharge. No needs at this time. Will follow and assist accordingly.

## 2022-04-13 VITALS
SYSTOLIC BLOOD PRESSURE: 142 MMHG | HEIGHT: 63 IN | BODY MASS INDEX: 27.09 KG/M2 | WEIGHT: 152.9 LBS | RESPIRATION RATE: 16 BRPM | TEMPERATURE: 98.1 F | OXYGEN SATURATION: 96 % | DIASTOLIC BLOOD PRESSURE: 67 MMHG | HEART RATE: 79 BPM

## 2022-04-13 LAB
ANION GAP SERPL CALCULATED.3IONS-SCNC: 10 MMOL/L (ref 7–16)
BUN BLDV-MCNC: 10 MG/DL (ref 6–23)
CALCIUM SERPL-MCNC: 8 MG/DL (ref 8.6–10.2)
CHLORIDE BLD-SCNC: 111 MMOL/L (ref 98–107)
CO2: 19 MMOL/L (ref 22–29)
CREAT SERPL-MCNC: 0.7 MG/DL (ref 0.5–1)
GFR AFRICAN AMERICAN: >60
GFR NON-AFRICAN AMERICAN: >60 ML/MIN/1.73
GLUCOSE BLD-MCNC: 81 MG/DL (ref 74–99)
HCT VFR BLD CALC: 37.7 % (ref 34–48)
HCT VFR BLD CALC: 38.5 % (ref 34–48)
HEMOGLOBIN: 12.3 G/DL (ref 11.5–15.5)
HEMOGLOBIN: 12.6 G/DL (ref 11.5–15.5)
MAGNESIUM: 1.8 MG/DL (ref 1.6–2.6)
POTASSIUM REFLEX MAGNESIUM: 3.3 MMOL/L (ref 3.5–5)
SODIUM BLD-SCNC: 140 MMOL/L (ref 132–146)

## 2022-04-13 PROCEDURE — 80048 BASIC METABOLIC PNL TOTAL CA: CPT

## 2022-04-13 PROCEDURE — 36415 COLL VENOUS BLD VENIPUNCTURE: CPT

## 2022-04-13 PROCEDURE — 2580000003 HC RX 258: Performed by: FAMILY MEDICINE

## 2022-04-13 PROCEDURE — 6360000002 HC RX W HCPCS: Performed by: FAMILY MEDICINE

## 2022-04-13 PROCEDURE — C9113 INJ PANTOPRAZOLE SODIUM, VIA: HCPCS | Performed by: FAMILY MEDICINE

## 2022-04-13 PROCEDURE — 6370000000 HC RX 637 (ALT 250 FOR IP): Performed by: FAMILY MEDICINE

## 2022-04-13 PROCEDURE — 6370000000 HC RX 637 (ALT 250 FOR IP): Performed by: NURSE PRACTITIONER

## 2022-04-13 PROCEDURE — 85014 HEMATOCRIT: CPT

## 2022-04-13 PROCEDURE — A4216 STERILE WATER/SALINE, 10 ML: HCPCS | Performed by: FAMILY MEDICINE

## 2022-04-13 PROCEDURE — 83735 ASSAY OF MAGNESIUM: CPT

## 2022-04-13 PROCEDURE — 85018 HEMOGLOBIN: CPT

## 2022-04-13 RX ORDER — PANTOPRAZOLE SODIUM 40 MG/1
40 TABLET, DELAYED RELEASE ORAL
Qty: 60 TABLET | Refills: 0 | Status: SHIPPED | OUTPATIENT
Start: 2022-04-13 | End: 2022-09-07 | Stop reason: ALTCHOICE

## 2022-04-13 RX ORDER — POTASSIUM CHLORIDE 20 MEQ/1
40 TABLET, EXTENDED RELEASE ORAL ONCE
Status: COMPLETED | OUTPATIENT
Start: 2022-04-13 | End: 2022-04-13

## 2022-04-13 RX ADMIN — SODIUM CHLORIDE, PRESERVATIVE FREE 40 MG: 5 INJECTION INTRAVENOUS at 08:14

## 2022-04-13 RX ADMIN — DORZOLAMIDE HYDROCHLORIDE 1 DROP: 20 SOLUTION/ DROPS OPHTHALMIC at 08:15

## 2022-04-13 RX ADMIN — TIMOLOL MALEATE 1 DROP: 5 SOLUTION OPHTHALMIC at 08:15

## 2022-04-13 RX ADMIN — SODIUM CHLORIDE: 9 INJECTION, SOLUTION INTRAVENOUS at 11:11

## 2022-04-13 RX ADMIN — SODIUM CHLORIDE, PRESERVATIVE FREE 10 ML: 5 INJECTION INTRAVENOUS at 08:15

## 2022-04-13 RX ADMIN — LISINOPRIL 20 MG: 20 TABLET ORAL at 08:14

## 2022-04-13 RX ADMIN — METOPROLOL SUCCINATE 25 MG: 25 TABLET, EXTENDED RELEASE ORAL at 08:14

## 2022-04-13 RX ADMIN — POTASSIUM CHLORIDE 40 MEQ: 20 TABLET, EXTENDED RELEASE ORAL at 05:29

## 2022-04-13 RX ADMIN — DORZOLAMIDE HYDROCHLORIDE 1 DROP: 20 SOLUTION/ DROPS OPHTHALMIC at 14:21

## 2022-04-13 ASSESSMENT — PAIN SCALES - GENERAL
PAINLEVEL_OUTOF10: 0

## 2022-04-13 NOTE — PROGRESS NOTES
Comprehensive Nutrition Assessment    Type and Reason for Visit:  Initial,Positive Nutrition Screen    Nutrition Recommendations/Plan: Start Ensure Clear/Gelatein BID & monitor for nutrition progression. Nutrition Assessment:  Pt adm d/t possible GIB w/ bloody BMs declining EGD/Cscope. Hx IBS, Afib. Pt currently on clears, will add ONS and monitor. Malnutrition Assessment:  Malnutrition Status: At risk for malnutrition (Comment)    Context:  Acute Illness     Findings of the 6 clinical characteristics of malnutrition:  Energy Intake:  Mild decrease in energy intake (Comment)  Weight Loss:  Unable to assess (lack measured w thx <1 year on file)     Body Fat Loss:  No significant body fat loss     Muscle Mass Loss:  No significant muscle mass loss    Fluid Accumulation:  No significant fluid accumulation     Strength:  Not Performed    Estimated Daily Nutrient Needs:  Energy (kcal):  3047-5318; Weight Used for Energy Requirements:  Current     Protein (g):  70-80; Weight Used for Protein Requirements:  Ideal (1.3-1.5)        Fluid (ml/day):  5972-1655; Method Used for Fluid Requirements:  1 ml/kcal      Nutrition Related Findings:  A&Ox4, active BS, soft/round/tender abd, trace BLE edema, I/Os WNL      Wounds:  None       Current Nutrition Therapies:    ADULT DIET; Clear Liquid    Anthropometric Measures:  · Height: 5' 3\" (160 cm)  · Current Body Weight: 148 lb 12.8 oz (67.5 kg) (4/12 bed scale)   · Admission Body Weight: 148 lb 12.8 oz (67.5 kg) (4/12 first taken bed scale)    · Usual Body Weight:  (lack measured wt hx <1 year on file)     · Ideal Body Weight: 115 lbs; % Ideal Body Weight 129.4 %   · BMI: 26.4  · BMI Categories: Overweight (BMI 25.0-29. 9)       Nutrition Diagnosis:   · Inadequate oral intake related to altered GI function as evidenced by NPO or clear liquid status due to medical condition    Nutrition Interventions:   Food and/or Nutrient Delivery:  Continue Current Diet,Start Oral Nutrition Supplement  Nutrition Education/Counseling:  Education not indicated   Coordination of Nutrition Care:  No recommendation at this time    Goals:  Nutrition progression & >50% ONS       Nutrition Monitoring and Evaluation:   Behavioral-Environmental Outcomes:  None Identified   Food/Nutrient Intake Outcomes:  Diet Advancement/Tolerance  Physical Signs/Symptoms Outcomes:  Biochemical Data,Nutrition Focused Physical Findings,Skin,Weight,Chewing or Swallowing,GI Status,Fluid Status or Edema     Discharge Planning:     Too soon to determine     Electronically signed by Cheyenne Mcmanus, MS, RD, LD on 4/13/22 at 9:04 AM EDT    Contact: [phone]

## 2022-04-13 NOTE — DISCHARGE SUMMARY
[facility]   Discharge summary   Patient ID:  Marisol Ochoa  [id number]  12 y.o.  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 fib / Eliquis began having bright red bloody bowel movements is admitted to telemetry unit with     GI bleed  -Telemetry monitoring  -H&H every 6 Hours transfuse as needed to keep hemoglobin greater than 7  -Hemoglobin 13.7 > 12.6/38.5 on discharge   -CLD advance to full's > tolerating   - IV hydration > discontinued   -Hold all blood thinners. > discontinue eliquis on discharge   -Consult general surgery -appreciate input  -No plans for endoscopy at this time    Follow up CBC on Monday and with Dr Dany Ashby next week. Recent Labs     04/11/22  0820 04/11/22  1840 04/12/22  2332 04/13/22  0340 04/13/22  1040   WBC 10.5  --   --   --   --    HGB 14.1   < > 10.6* 12.3 12.6   HCT 42.5   < > 32.5* 37.7 38.5     --   --   --   --     < > = values in this interval not displayed. Recent Labs     04/11/22  0820 04/12/22  0120 04/13/22  0340    135 140   K 4.0 3.1* 3.3*    104 111*   CO2 19* 18* 19*   BUN 25* 14 10   CREATININE 1.1* 0.8 0.7   CALCIUM 9.3 8.5* 8.0*       CT ABDOMEN PELVIS W IV CONTRAST Additional Contrast? None    Result Date: 4/11/2022  EXAMINATION: CT OF THE ABDOMEN AND PELVIS WITH CONTRAST 4/11/2022 9:22 am TECHNIQUE: CT of the abdomen and pelvis was performed with the administration of intravenous contrast. Multiplanar reformatted images are provided for review.  Dose modulation, iterative reconstruction, and/or weight based adjustment of the mA/kV was utilized to reduce the radiation dose to as low as reasonably achievable. COMPARISON: None. HISTORY: ORDERING SYSTEM PROVIDED HISTORY: LUQ, LLQ pain, gi bleeding TECHNOLOGIST PROVIDED HISTORY: Reason for exam:->LUQ, LLQ pain, gi bleeding Additional Contrast?->None Decision Support Exception - unselect if not a suspected or confirmed emergency medical condition->Emergency Medical Condition (MA) FINDINGS: Lower Chest: Lung bases appear generally clear. Minimal dependent hypo stasis and linear atelectasis or scarring. Heart is upper limits of normal in size with prominent atria. Sliding-type hiatal hernia. Organs: Liver is normal in size without focal lesion. Gallbladder bile ducts and pancreas appear normal.  Spleen is normal in size. There is a 9 mm rounded calcific density in the splenic hilum which could represent a tiny splenic artery aneurysm. The adrenal glands and kidneys are within normal limits. GI/Bowel: The colon is decompressed. There is wall thickening and surrounding inflammation involving the splenic flexure to the level of the sigmoid colon. Findings are compatible with colitis including infection, inflammatory or ischemic. Contrast material is present within the inferior mesenteric artery near its origin. Distal NILO not well visualized. No bowel obstruction detected. Pelvis: Uterus appears normal.  Urinary bladder is moderately distended. No pelvic free fluid or abscess. 2.6 cm bilobed low-density lesion involving the right ovary, simple appearing cyst.  No further imaging evaluation recommended. Peritoneum/Retroperitoneum: There is ASVD of the abdominal aorta without evidence of aneurysm. Bones/Soft Tissues: Diffuse osteopenia with grade 1 anterolisthesis of L5 on S1 due to bilateral spondylolysis. Mild multilevel facet arthropathy. Vacuum disc change at L3-4. No acute fracture.   No lytic or blastic lesions detected. Mild degenerative changes in the hips, right greater than left. Tiny fat containing umbilical hernia. 1.  Wall thickening and inflammation involving the left colon most prominent at the left colon/sigmoid junction. No evidence of abscess. No free air. Findings compatible with colitis. 2.  Simple appearing 2.6 cm right ovarian cyst, no further imaging evaluation required. XR CHEST PORTABLE    Result Date: 4/11/2022  EXAMINATION: ONE XRAY VIEW OF THE CHEST 4/11/2022 8:25 am COMPARISON: 07/12/2019 HISTORY: ORDERING SYSTEM PROVIDED HISTORY: abd pain TECHNOLOGIST PROVIDED HISTORY: Reason for exam:->abd pain FINDINGS: The lungs are without acute focal process. There is no effusion or pneumothorax. The cardiomediastinal silhouette is without acute process. The osseous structures are without acute process. No acute process. Discharge Exam:    HEENT: NCAT,  PERRLA, No JVD  Heart:  RRR, no murmurs, gallops, or rubs.   Lungs:  CTA bilaterally, no wheeze, rales or rhonchi  Abd: bowel sounds present, nontender, nondistended, no masses  Extrem:  No clubbing, cyanosis, or edema    Disposition: home     Patient Condition at Discharge: Stable     Patient Instructions:      Medication List        START taking these medications      pantoprazole 40 MG tablet  Commonly known as: PROTONIX  Take 1 tablet by mouth 2 times daily (before meals)            CHANGE how you take these medications      atorvastatin 40 MG tablet  Commonly known as: LIPITOR  Take 1 tablet by mouth nightly  What changed: when to take this     metoprolol succinate 25 MG extended release tablet  Commonly known as: TOPROL XL  Take 1 tablet by mouth 2 times daily  What changed: how much to take            CONTINUE taking these medications      ALPRAZolam 0.5 MG tablet  Commonly known as: XANAX     amLODIPine 5 MG tablet  Commonly known as: NORVASC     dorzolamide-timolol 22.3-6.8 MG/ML ophthalmic solution  Commonly known as: COSOPT latanoprost 0.005 % ophthalmic solution  Commonly known as: XALATAN     lisinopril 20 MG tablet  Commonly known as: PRINIVIL;ZESTRIL     PROBIOTIC DAILY PO     * promethazine 25 MG tablet  Commonly known as: PHENERGAN     * promethazine 50 MG/ML injection  Commonly known as: PHENERGAN     VITAMIN B 12 PO           * This list has 2 medication(s) that are the same as other medications prescribed for you. Read the directions carefully, and ask your doctor or other care provider to review them with you. STOP taking these medications      apixaban 5 MG Tabs tablet  Commonly known as: ELIQUIS     citalopram 20 MG tablet  Commonly known as: CELEXA               Where to Get Your Medications        These medications were sent to [address] [place] [address], [address] [place] [address]      Phone: [phone]   pantoprazole 40 MG tablet       Activity: activity as tolerated  Diet: regular diet    Pt has been advised to: Follow-up with Sarah River MD in 1 week. Follow-up with consultants as recommended by them    Note that over 30 minutes was spent in preparing discharge papers, discussing discharge with patient, medication review, etc.    Signed:  ANTHONY Polk CNP  4/13/2022  4:01 PM    Above note edited to reflect my thoughts     I personally saw, examined and provided care for the patient. Radiographs, labs and medication list were reviewed by me independently. The case was discussed in detail and plans for care were established. Review of DOMO Polk   , documentation was conducted and revisions were made as appropriate directly by me. I agree with the above documented exam, problem list, and plan of care.      Treesita Harrell MD  4/13/2022

## 2022-04-13 NOTE — PLAN OF CARE
Problem: Bleeding:  Goal: Will show no signs and symptoms of excessive bleeding  Description: Will show no signs and symptoms of excessive bleeding  Outcome: Met This Shift     Problem: Falls - Risk of:  Goal: Will remain free from falls  Description: Will remain free from falls  Outcome: Met This Shift  Goal: Absence of physical injury  Description: Absence of physical injury  Outcome: Met This Shift     Problem: Skin Integrity:  Goal: Will show no infection signs and symptoms  Description: Will show no infection signs and symptoms  Outcome: Met This Shift  Goal: Absence of new skin breakdown  Description: Absence of new skin breakdown  Outcome: Met This Shift

## 2022-04-16 LAB
BLOOD CULTURE, ROUTINE: NORMAL
CULTURE, BLOOD 2: NORMAL

## 2022-09-07 ENCOUNTER — OFFICE VISIT (OUTPATIENT)
Dept: VASCULAR SURGERY | Age: 82
End: 2022-09-07
Payer: MEDICARE

## 2022-09-07 ENCOUNTER — HOSPITAL ENCOUNTER (OUTPATIENT)
Dept: CARDIOLOGY | Age: 82
Discharge: HOME | End: 2022-09-07
Payer: MEDICARE

## 2022-09-07 VITALS — DIASTOLIC BLOOD PRESSURE: 68 MMHG | SYSTOLIC BLOOD PRESSURE: 124 MMHG

## 2022-09-07 DIAGNOSIS — I65.22 STENOSIS OF LEFT CAROTID ARTERY: Primary | ICD-10-CM

## 2022-09-07 PROCEDURE — 93880 EXTRACRANIAL BILAT STUDY: CPT

## 2022-09-07 PROCEDURE — 1123F ACP DISCUSS/DSCN MKR DOCD: CPT | Performed by: SURGERY

## 2022-09-07 PROCEDURE — 99213 OFFICE O/P EST LOW 20 MIN: CPT | Performed by: SURGERY

## 2022-09-07 NOTE — PROGRESS NOTES
MG tablet Take 5 mg by mouth at bedtime  19  Yes Historical Provider, MD   lisinopril (PRINIVIL;ZESTRIL) 20 MG tablet Take 20 mg by mouth daily 19  Yes Historical Provider, MD   Cyanocobalamin (VITAMIN B 12 PO) Take by mouth   Yes Historical Provider, MD   atorvastatin (LIPITOR) 40 MG tablet Take 1 tablet by mouth nightly  Patient taking differently: Take 40 mg by mouth at bedtime 19  Yes Yuliya Vazquez MD   metoprolol succinate (TOPROL XL) 25 MG extended release tablet Take 1 tablet by mouth 2 times daily  Patient taking differently: Take 50 mg by mouth 2 times daily 19  Yes Yuliya Vazquez MD   dorzolamide-timolol (COSOPT) 22.3-6.8 MG/ML ophthalmic solution 1 drop 2 times daily   Yes Historical Provider, MD   latanoprost (XALATAN) 0.005 % ophthalmic solution 1 drop nightly   Yes Historical Provider, MD     Allergies:  Erythromycin    Social History     Socioeconomic History    Marital status:       Spouse name: Not on file    Number of children: Not on file    Years of education: Not on file    Highest education level: Not on file   Occupational History    Not on file   Tobacco Use    Smoking status: Former     Types: Cigarettes     Quit date: 1986     Years since quittin.1    Smokeless tobacco: Never   Vaping Use    Vaping Use: Never used   Substance and Sexual Activity    Alcohol use: No    Drug use: No    Sexual activity: Not on file   Other Topics Concern    Not on file   Social History Narrative    Not on file     Social Determinants of Health     Financial Resource Strain: Not on file   Food Insecurity: Not on file   Transportation Needs: Not on file   Physical Activity: Not on file   Stress: Not on file   Social Connections: Not on file   Intimate Partner Violence: Not on file   Housing Stability: Not on file        Family History   Problem Relation Age of Onset    Other Mother 80        heart failure     Other Father         multiple myeloma        REVIEW OF SYSTEMS (New symptoms):    Eyes:      Blurred vision:  No [x]/Yes []               Diplopia:   No [x]/Yes []               Vision loss:       No [x]/Yes []   Ears, nose, throat:             Hearing loss:    No [x]/Yes []      Vertigo:   No [x]/Yes []                       Swallowing problem:  No [x]/Yes []               Nose bleeds:   No [x]/Yes []      Voice hoarseness:  No [x]/Yes []  Respiratory:             Cough:   No [x]/Yes []      Pleuritic chest pain:  No [x]/Yes []                        Dyspnea:   No [x]/Yes []      Wheezing:   No [x]/Yes []  Cardiovascular:             Angina:   No [x]/Yes []      Palpitations:   No [x]/Yes []          Claudication:    No [x]/Yes []      Leg swelling:   No [x]/Yes []  Gastrointestinal:             Nausea or vomiting:  No [x]/Yes []               Abdominal pain:  No [x]/Yes []                     Intestinal bleeding: No [x]/Yes []  Musculoskeletal:             Leg pain:   No [x]/Yes []      Back pain:   No [x]/Yes []                    Weakness:   No [x]/Yes []  Neurologic:             Numbness:   No [x]/Yes []      Paralysis:   No [x]/Yes []                       Headaches:   No [x]/Yes []  Hematologic, lymphatic:   Anemia:   No [x]/Yes []              Bleeding or bruising:  No [x]/Yes []              Fevers or chills: No [x]/Yes []  Endocrine:             Temp intolerance:   No [x]/Yes []                       Polydipsia, polyuria:  No [x]/Yes []  Skin:              Rash:    No [x]/Yes []      Ulcers:   No [x]/Yes []              Abnorm pigment: No [x]/Yes []  :              Frequency/urgency:  No [x]/Yes []      Hematuria:    No [x]/Yes []                      Incontinence:    No [x]/Yes []    PHYSICAL EXAM:  Vitals:    09/07/22 1057   BP: 124/68     General Appearance: alert and oriented to person, place and time, in no acute distress, well developed and well- nourished  Neurologic: no cranial nerve deficit, speech normal  Head: normocephalic and atraumatic  Eyes: extraocular eye movements intact, conjunctivae normal  ENT: external ear and ear canal normal bilaterally, nose without deformity, no carotid bruits  Pulmonary/Chest: normal air movement, no respiratory distress  Cardiovascular: normal rate, irregular rhythm  Abdomen: non-distended, no masses  Musculoskeletal: no joint deformity or tenderness  Extremities: + slight leg edema bilaterally  Skin: warm and dry, no rash or erythema    PULSE EXAM      Right      Left   Brachial     Radial 2 2   Femoral     Popliteal     Dorsalis Pedis     Posterior Tibial     (3=normal, 2=diminished, 1=barely palpable, 4=widened)    RADIOLOGY:   Carotid ultrasound: <50% stenosis of the bilateral carotid arteries  Antegrade flow in the bilateral vertberal arteries     Leighann Wild  1940  Date of study: 9/7/22     Indication for study:  Carotid artery stenosis  Study : Bilateral Carotid Artery Duplex Examination     Duplex examination of the RIGHT carotid artery system identifies atherosclerotic plaque. The peak systolic velocity in internal carotid artery was 75 centimeters / second. The maximum end diastolic velocity was 20 centimeters / second. The ICA/CCA ratio is 1.1. The right vertebral artery has antegrade flow. Duplex examination of the LEFT carotid artery system identifies atherosclerotic plaque. The peak systolic velocity in internal carotid artery was 85 centimeters / second. The maximum end diastolic velocity was 24 centimeters / second. The ICA/CCA ratio is 1.1. The left vertebral artery has antegrade flow. LAST STUDY  6/16/2021 @ [facility]       Problem List Items Addressed This Visit       Stenosis of left carotid artery - Primary       Assessment and plan: #1 status post left carotid endarterectomy   Her bilateral carotids are widely patent. She can follow-up in 1 year. Results were shared with the patient. They will call if is any questions or concerns or issues.   Lupe Main

## 2022-09-07 NOTE — PROGRESS NOTES
[facility] - [facility]    This is a pre read worksheet - prior to official physician interpretation    Mary Baez  1940  Date of study: 9/7/22    Indication for study:  Carotid artery stenosis  Study : Bilateral Carotid Artery Duplex Examination    Duplex examination of the RIGHT carotid artery system identifies atherosclerotic plaque. The peak systolic velocity in internal carotid artery was 75 centimeters / second. The maximum end diastolic velocity was 20 centimeters / second. The ICA/CCA ratio is 1.1. The right vertebral artery has antegrade flow. Duplex examination of the LEFT carotid artery system identifies atherosclerotic plaque. The peak systolic velocity in internal carotid artery was 85 centimeters / second. The maximum end diastolic velocity was 24 centimeters / second. The ICA/CCA ratio is 1.1. The left vertebral artery has antegrade flow.         LAST STUDY  6/16/2021 @ [facility]

## 2022-09-20 ENCOUNTER — APPOINTMENT (OUTPATIENT)
Dept: CT IMAGING | Age: 82
DRG: 062 | End: 2022-09-20
Payer: MEDICARE

## 2022-09-20 ENCOUNTER — HOSPITAL ENCOUNTER (INPATIENT)
Age: 82
LOS: 4 days | Discharge: HOME HEALTH | DRG: 062 | End: 2022-09-24
Attending: EMERGENCY MEDICINE | Admitting: INTERNAL MEDICINE
Payer: MEDICARE

## 2022-09-20 ENCOUNTER — APPOINTMENT (OUTPATIENT)
Dept: GENERAL RADIOLOGY | Age: 82
DRG: 062 | End: 2022-09-20
Payer: MEDICARE

## 2022-09-20 DIAGNOSIS — I63.9 CEREBROVASCULAR ACCIDENT (CVA), UNSPECIFIED MECHANISM (HCC): Primary | ICD-10-CM

## 2022-09-20 PROBLEM — I48.91 ATRIAL FIBRILLATION (HCC): Status: ACTIVE | Noted: 2022-09-20

## 2022-09-20 LAB
ABO/RH: NORMAL
ALBUMIN SERPL-MCNC: 4.5 G/DL (ref 3.5–5.2)
ALP BLD-CCNC: 111 U/L (ref 35–104)
ALT SERPL-CCNC: 12 U/L (ref 0–32)
ANION GAP SERPL CALCULATED.3IONS-SCNC: 14 MMOL/L (ref 7–16)
ANTIBODY SCREEN: NORMAL
APTT: 25 SEC (ref 24.5–35.1)
AST SERPL-CCNC: 17 U/L (ref 0–31)
BASOPHILS ABSOLUTE: 0.04 E9/L (ref 0–0.2)
BASOPHILS RELATIVE PERCENT: 0.8 % (ref 0–2)
BILIRUB SERPL-MCNC: 0.8 MG/DL (ref 0–1.2)
BUN BLDV-MCNC: 18 MG/DL (ref 6–23)
CALCIUM SERPL-MCNC: 9.1 MG/DL (ref 8.6–10.2)
CHLORIDE BLD-SCNC: 107 MMOL/L (ref 98–107)
CHP ED QC CHECK: YES
CO2: 20 MMOL/L (ref 22–29)
CREAT SERPL-MCNC: 1 MG/DL (ref 0.5–1)
EOSINOPHILS ABSOLUTE: 0.15 E9/L (ref 0.05–0.5)
EOSINOPHILS RELATIVE PERCENT: 2.8 % (ref 0–6)
GFR AFRICAN AMERICAN: >60
GFR NON-AFRICAN AMERICAN: 53 ML/MIN/1.73
GLUCOSE BLD-MCNC: 131 MG/DL
GLUCOSE BLD-MCNC: 133 MG/DL (ref 74–99)
HCT VFR BLD CALC: 38.4 % (ref 34–48)
HEMOGLOBIN: 12.3 G/DL (ref 11.5–15.5)
IMMATURE GRANULOCYTES #: 0.01 E9/L
IMMATURE GRANULOCYTES %: 0.2 % (ref 0–5)
INR BLD: 1.2
LACTIC ACID: 1.4 MMOL/L (ref 0.5–2.2)
LYMPHOCYTES ABSOLUTE: 1.15 E9/L (ref 1.5–4)
LYMPHOCYTES RELATIVE PERCENT: 21.7 % (ref 20–42)
MCH RBC QN AUTO: 28.5 PG (ref 26–35)
MCHC RBC AUTO-ENTMCNC: 32 % (ref 32–34.5)
MCV RBC AUTO: 88.9 FL (ref 80–99.9)
MONOCYTES ABSOLUTE: 0.49 E9/L (ref 0.1–0.95)
MONOCYTES RELATIVE PERCENT: 9.2 % (ref 2–12)
NEUTROPHILS ABSOLUTE: 3.46 E9/L (ref 1.8–7.3)
NEUTROPHILS RELATIVE PERCENT: 65.3 % (ref 43–80)
PDW BLD-RTO: 14.9 FL (ref 11.5–15)
PLATELET # BLD: 172 E9/L (ref 130–450)
PMV BLD AUTO: 10.2 FL (ref 7–12)
POTASSIUM REFLEX MAGNESIUM: 3.7 MMOL/L (ref 3.5–5)
PROTHROMBIN TIME: 12.7 SEC (ref 9.3–12.4)
RBC # BLD: 4.32 E12/L (ref 3.5–5.5)
SODIUM BLD-SCNC: 141 MMOL/L (ref 132–146)
TOTAL PROTEIN: 7.8 G/DL (ref 6.4–8.3)
TROPONIN, HIGH SENSITIVITY: 15 NG/L (ref 0–9)
WBC # BLD: 5.3 E9/L (ref 4.5–11.5)

## 2022-09-20 PROCEDURE — 2580000003 HC RX 258: Performed by: EMERGENCY MEDICINE

## 2022-09-20 PROCEDURE — 99291 CRITICAL CARE FIRST HOUR: CPT | Performed by: PSYCHIATRY & NEUROLOGY

## 2022-09-20 PROCEDURE — 0042T CT BRAIN PERFUSION: CPT

## 2022-09-20 PROCEDURE — 99285 EMERGENCY DEPT VISIT HI MDM: CPT

## 2022-09-20 PROCEDURE — 85730 THROMBOPLASTIN TIME PARTIAL: CPT

## 2022-09-20 PROCEDURE — 70450 CT HEAD/BRAIN W/O DYE: CPT

## 2022-09-20 PROCEDURE — 83605 ASSAY OF LACTIC ACID: CPT

## 2022-09-20 PROCEDURE — 4A03X5D MEASUREMENT OF ARTERIAL FLOW, INTRACRANIAL, EXTERNAL APPROACH: ICD-10-PCS | Performed by: INTERNAL MEDICINE

## 2022-09-20 PROCEDURE — 3E03317 INTRODUCTION OF OTHER THROMBOLYTIC INTO PERIPHERAL VEIN, PERCUTANEOUS APPROACH: ICD-10-PCS | Performed by: INTERNAL MEDICINE

## 2022-09-20 PROCEDURE — 85025 COMPLETE CBC W/AUTO DIFF WBC: CPT

## 2022-09-20 PROCEDURE — 86901 BLOOD TYPING SEROLOGIC RH(D): CPT

## 2022-09-20 PROCEDURE — 86850 RBC ANTIBODY SCREEN: CPT

## 2022-09-20 PROCEDURE — 6360000002 HC RX W HCPCS: Performed by: EMERGENCY MEDICINE

## 2022-09-20 PROCEDURE — 70496 CT ANGIOGRAPHY HEAD: CPT

## 2022-09-20 PROCEDURE — 6360000004 HC RX CONTRAST MEDICATION: Performed by: RADIOLOGY

## 2022-09-20 PROCEDURE — 86900 BLOOD TYPING SEROLOGIC ABO: CPT

## 2022-09-20 PROCEDURE — 80053 COMPREHEN METABOLIC PANEL: CPT

## 2022-09-20 PROCEDURE — 96374 THER/PROPH/DIAG INJ IV PUSH: CPT

## 2022-09-20 PROCEDURE — 71045 X-RAY EXAM CHEST 1 VIEW: CPT

## 2022-09-20 PROCEDURE — 2000000000 HC ICU R&B

## 2022-09-20 PROCEDURE — 85610 PROTHROMBIN TIME: CPT

## 2022-09-20 PROCEDURE — 84484 ASSAY OF TROPONIN QUANT: CPT

## 2022-09-20 PROCEDURE — 70498 CT ANGIOGRAPHY NECK: CPT

## 2022-09-20 RX ORDER — SODIUM CHLORIDE 0.9 % (FLUSH) 0.9 %
10 SYRINGE (ML) INJECTION ONCE
Status: COMPLETED | OUTPATIENT
Start: 2022-09-20 | End: 2022-09-20

## 2022-09-20 RX ORDER — SODIUM CHLORIDE 0.9 % (FLUSH) 0.9 %
5-40 SYRINGE (ML) INJECTION PRN
Status: DISCONTINUED | OUTPATIENT
Start: 2022-09-20 | End: 2022-09-24 | Stop reason: HOSPADM

## 2022-09-20 RX ORDER — DEXTROSE MONOHYDRATE 25 G/50ML
12.5 INJECTION, SOLUTION INTRAVENOUS
Status: ACTIVE | OUTPATIENT
Start: 2022-09-20 | End: 2022-09-20

## 2022-09-20 RX ORDER — HYDRALAZINE HYDROCHLORIDE 20 MG/ML
5 INJECTION INTRAMUSCULAR; INTRAVENOUS ONCE
Status: DISCONTINUED | OUTPATIENT
Start: 2022-09-20 | End: 2022-09-21

## 2022-09-20 RX ORDER — SODIUM CHLORIDE 9 MG/ML
INJECTION, SOLUTION INTRAVENOUS PRN
Status: DISCONTINUED | OUTPATIENT
Start: 2022-09-20 | End: 2022-09-24 | Stop reason: HOSPADM

## 2022-09-20 RX ORDER — SODIUM CHLORIDE 0.9 % (FLUSH) 0.9 %
5-40 SYRINGE (ML) INJECTION EVERY 12 HOURS SCHEDULED
Status: DISCONTINUED | OUTPATIENT
Start: 2022-09-20 | End: 2022-09-24 | Stop reason: HOSPADM

## 2022-09-20 RX ADMIN — IOPAMIDOL 100 ML: 755 INJECTION, SOLUTION INTRAVENOUS at 17:53

## 2022-09-20 RX ADMIN — Medication 10 ML: at 17:53

## 2022-09-20 RX ADMIN — Medication 10 ML: at 17:57

## 2022-09-20 RX ADMIN — Medication 17 MG: at 17:54

## 2022-09-20 RX ADMIN — Medication 10 ML: at 20:43

## 2022-09-20 ASSESSMENT — PAIN - FUNCTIONAL ASSESSMENT
PAIN_FUNCTIONAL_ASSESSMENT: NONE - DENIES PAIN

## 2022-09-20 NOTE — VIRTUAL HEALTH
Consults  Patient Location:  [address] [place] [address] Emergency Department    Provider Location (City/State): [facility]      This virtual visit was conducted via interactive/real-time audio/video. Stroke Neurology Consult Note  9/20/2022 5:54 PM  Pt Name: Timmy Dubose  MRN: [id number]  YOB: 1940  Date of evaluation: 9/20/2022  Primary Care Physician: Emily Tenorio MD    Stroke referral received from Sanford Mcburney, DO based upon patient's presenting stroke like symptomology. Timmy Dubose is a 78 y.o. female who presents with Acute aphasia and facial droop, became better on ER arrival and then when wrapping up had aphasia and NIHSS of 2. As this was an active event stroke alert was pursued    CT negative for bleed- has a hyperdense M5 branch on the letf   CTA Left MCA m2 occlusion . CTP large penumbra and no CORE infarct         Prior Functional Status(Modified [place] Scale):  0=No symptoms at all    Allergies   Allergen Reactions    Erythromycin Nausea And Vomiting       Medications  Prior to Admission medications    Medication Sig Start Date End Date Taking? Authorizing Provider   ALPRAZolam Daryel Fletcher) 0.5 MG tablet Take 0.5 mg by mouth nightly as needed for Sleep.     Historical Provider, MD   promethazine (PHENERGAN) 25 MG tablet Take 25 mg by mouth every 6 hours as needed for Nausea    Historical Provider, MD   amLODIPine (NORVASC) 5 MG tablet Take 5 mg by mouth at bedtime  8/30/19   Historical Provider, MD   lisinopril (PRINIVIL;ZESTRIL) 20 MG tablet Take 20 mg by mouth daily 8/27/19   Historical Provider, MD   Cyanocobalamin (VITAMIN B 12 PO) Take by mouth    Historical Provider, MD   atorvastatin (LIPITOR) 40 MG tablet Take 1 tablet by mouth nightly  Patient taking differently: Take 40 mg by mouth at bedtime 6/11/19   Nany Casanova MD   metoprolol succinate (TOPROL XL) 25 MG extended release tablet Take 1 tablet by mouth 2 times daily  Patient taking differently: Take 50 mg by mouth 2 times daily 19   Lynsey Ortiz MD   dorzolamide-timolol (COSOPT) 22.3-6.8 MG/ML ophthalmic solution 1 drop 2 times daily    Historical Provider, MD   latanoprost (XALATAN) 0.005 % ophthalmic solution 1 drop nightly    Historical Provider, MD         Past Medical History:   Diagnosis Date    Arthritis     Atrial fibrillation ([facility] [address]     Carotid stenosis, right     Cerebral artery occlusion with cerebral infarction ([facility] [address]     MEMORY ISSUES, GAIT ISSUES    Glaucoma     History of blood transfusion     rupture appendicitis    Hx of blood clots     developed in leg after appendectomy age 13 yrs     Hyperlipidemia     Hypertension     IBS (irritable bowel syndrome)     Nausea     Nosebleed 2019    in [place] dr [place]re[facility] left nostril packing    UTI (lower urinary tract infection)      Past Surgical History:   Procedure Laterality Date    APPENDECTOMY      CAROTID ENDARTERECTOMY Left 2019    LEFT CAROTID ENDARTERECTOMY performed by Caroline Wang MD at [address] [place] [address] 37 Bilateral     cataracts     Social History     Socioeconomic History    Marital status:       Spouse name: Not on file    Number of children: Not on file    Years of education: Not on file    Highest education level: Not on file   Occupational History    Not on file   Tobacco Use    Smoking status: Former     Types: Cigarettes     Quit date: 1986     Years since quittin.2    Smokeless tobacco: Never   Vaping Use    Vaping Use: Never used   Substance and Sexual Activity    Alcohol use: No    Drug use: No    Sexual activity: Not on file   Other Topics Concern    Not on file   Social History Narrative    Not on file     Social Determinants of Health     Financial Resource Strain: Not on file   Food Insecurity: Not on file   Transportation Needs: Not on file   Physical Activity: Not on file   Stress: Not on file   Social Connections: Not on file   Intimate Partner Violence: Not on file   Housing Stability: Not on file       OBJECTIVE  Vitals:    09/20/22 1745   Weight: 150 lb (68 kg)     NIH Stroke Scale at time of initial evaluation:        NIH Stroke Scale/Score at time of initial evaluation:    1A: Level of Consciousness 0 - alert; keenly responsive   1B: Ask Month and Age 0 - answers both questions correctly   1C: Tell Patient To Open and Close Eyes, then Hand  Squeeze 0 - performs both tasks correctly   2: Test Horizontal Extraocular Movements 0 - normal   3: Test Visual Fields 0 - no visual loss   4: Test Facial Palsy 1 - minor paralysis (flattened nasolabial fold, asymmetric on smiling)   5A: Test Left Arm Motor Drift 0 - no drift, limb holds 90 (or 45) degrees for full 10 seconds   5B: Test Right Arm Motor Drift 0 - no drift, limb holds 90 (or 45) degrees for full 10 seconds   6A: Test Left Leg Motor Drift 0 - no drift; leg holds 30 degree position for full 5 seconds   6B: Test Right Leg Motor Drift 0 - no drift; leg holds 30 degree position for full 5 seconds   7: Test Limb Ataxia (FNF/Heel-Shin) 0 - absent   8: Test Sensation 0 - normal; no sensory loss   9: Test Language/Aphasia 1 - mild to moderate aphasia; some obvious loss of fluency or facility of comprehension without significant limitation on ideas expressed or form of expression. Reduction of speech and/or comprehension, however, makes conversation about provided materials difficult or impossible. For example, in conversation about provided materials, examiner can identify picture or naming card content from patient's response.     10: Test Dysarthria 0 - normal   11: Test Extinction/Inattention 0 - no abnormality   Total 2              Imaging:  CT of head without contrast displays Evidence of Intravascular Thrombus  CTA/CTP imaging: CTP shows large penumbra and no CORE     VIZ LVO was utilized to assist with triage      Labs:  Labs Reviewed   POCT GLUCOSE - Normal   CBC WITH AUTO DIFFERENTIAL COMPREHENSIVE METABOLIC PANEL W/ REFLEX TO MG FOR LOW K   TROPONIN    Narrative:     High Sensitivity Troponin T   PROTIME-INR   APTT   URINALYSIS WITH MICROSCOPIC   LACTIC ACID    Narrative:     Draw gray top tube-place on ice.~Separate plasma-keep cold. TYPE AND SCREEN       IV tNK INCLUSION criteria met Yes:  Diagnosis of ischemic stroke causing a measurable neurologic deficit. Time from last known well(or stroke onset) is:  *Less than 3 hours or  *Between 3-4.5 hours and the patient is > or = [de-identified]years old, on no anticoagulants       regardless of INR, NIHSS equal or < 25 and has no history of prior stroke with diabetes. *Adult aged 25 years or older. Assessment:  Working Diagnosis: Ischemic stroke    Acute Ischemic stroke in the Left MCA territory with M2 and M5 thrombus noted    Recommendations:  Patient is an IV-TNK candidate:    No history of recent bleeding, no history of brain bleed/aneurysm/brain tumor, or recent surgery/trauma. IV-TNK Consent: I have informed the patient and/or family of all the associated risks including 1% of sich/death, benefits of potential improved thrombolysis, and alternative of using IV tenectaplase instead of IV t-PA. The patient and/or family voluntarily consent to the administration of IV t-PA. Additional RISK OF Bleed from GI bleed and Epistaxis were explained and she voluntarily consents to tnk    --Admit the patient to an intensive care or stroke unit for monitoring. --If the patient develops severe headache, acute hypertension, nausea, or vomiting or has a worsening neurological examination, discontinue the infusion and obtain emergent CT scan. --Measure blood pressure and perform neurological assessments every 15 minutes during and after IV rtPA infusion for 2 hours, then every 30 minutes for 6 hours, then hourly until 24 hours after IV rtPA treatment.    --Increase the frequency of blood pressure measurements if systolic blood pressure is >180 mmHg or if diastolic blood pressure is >105 mmHg; administer antihypertensive medications to maintain blood pressure at or below these levels  --Delay placement of nasogastric tubes, indwelling bladder catheters, or intra-arterial pressure catheters if the patient can be safely managed without them. --Obtain a follow-up CT or MRI scan at 24 hours after IV rtPA before starting anticoagulants or antiplatelet agents. No antiplatelet agent or SQ heparinoids for 24 hrs. --Keep HOB < 15 degrees  --Keep NPO unless passes bedside dysphagia screen or swallow evaluation. --IVF with NS @ 100cc per hour  --PT/OT/SLP  - Gen Neuro Consult    This patient IS NOT a candidate for endovascular treatment DUE TO LOW NIHSS- NIHSS is only 2. She would need to have NIHSS>6 to be considered that the risk of intervention outweigh the benefits- AND patient is getting the standard of care which is TNK alone , hopefully she improves with the thrombolytic    SHOULD SHE WORSEN verbal consent was obtained for intervetion  She wishes for thrombectomy if she worsens and gave permission to proceed , Witnessed by ED Pharmacist. ED Doc and ED RN         Consultation completed by Holly Laguna MD via audio visual equipment. CRITICAL CARE BILLING  A total of 34 minutes of TELEcritical care time was spent addressing central nervous system failure. Neurocritical care elements considered include administration of IV thrombolytics, evaluation of risk of cerebral bleeding, Comprehensive plan with EVT if she worsens. Epistaxis risk and GI bleed risk complicating medical decision making  This time was also spent reviewing the patient's case, discussing with other physicians, evaluating imaging, and direct patient care. Greater than 80 % of the time was spent on direct, face to face,  patient care and counseling.          Electronically signed by Holly Laguna MD on 9/20/2022 at 5:54 PM

## 2022-09-20 NOTE — ED PROVIDER NOTES
Department of Emergency Medicine   ED  Provider Note  Admit Date/RoomTime: 9/20/2022  5:26 PM  ED Room: 12/12 9/20/22  5:28 PM EDT      HISTORY OF PRESENT ILLNESS:  (Nurses Notes Reviewed)    Chief Complaint:   No chief complaint on file. Source of history provided by:  patient. History/Exam Limitations: none. Nuria Cho is a 78 y.o. old female presenting to the emergency department by EMS, with sudden onset of dysarthria, aphasia, facial droop and altered mental status. , which began at home witnessed by family. Last known well time: 1630. The episode occurred at home. Since recognized the symptoms have been improving. She has history of stroke. She has stroke risk factors of: atrial fibrillation, carotid stenosis, hyperlipidemia, and hypertension. There have been associated symptoms of altered mental status dysarthria and aphasia. There has been no history of recent trauma. Code Status on file: Prior. NIH Stroke Scale at time of initial evaluation: 1725  1A: Level of Consciousness 0 - alert; keenly responsive   1B: Ask Month and Age 0 - answers both questions correctly   1C:  Tell Patient To Open and Close Eyes, then Hand  Squeeze 0 - performs both tasks correctly   2: Test Horizontal Extraocular Movements 0 - normal   3: Test Visual Fields 0 - no visual loss   4: Test Facial Palsy 0 - normal symmetric movement   5A: Test Left Arm Motor Drift 0 - no drift, limb holds 90 (or 45) degrees for full 10 seconds   5B: Test Right Arm Motor Drift 0 - no drift, limb holds 90 (or 45) degrees for full 10 seconds   6A: Test Left Leg Motor Drift 0 - no drift; leg holds 30 degree position for full 5 seconds   6B: Test Right Leg Motor Drift 0 - no drift; leg holds 30 degree position for full 5 seconds   7: Test Limb Ataxia   (FNF/Heel-Shin) 0 - absent   8: Test Sensation 0 - normal; no sensory loss   9: Test Language/Aphasia 1 - mild to moderate aphasia; some obvious loss of fluency or facility of comprehension without significant limitation on ideas expressed or form of expression. Reduction of speech and/or comprehension, however, makes conversation about provided materials difficult or impossible. For example, in conversation about provided materials, examiner can identify picture or naming card content from patient's response. 10: Test Dysarthria 1 - mild to moderate, patient slurs at least some words and at worst, can be understood with some difficulty   11: Test Extinction/Inattention 0 - no abnormality   Total 2             Past Medical History:  has a past medical history of Arthritis, Atrial fibrillation ([address] Carotid stenosis, right, Cerebral artery occlusion with cerebral infarction ([address] Glaucoma, History of blood transfusion, Hx of blood clots, Hyperlipidemia, Hypertension, IBS (irritable bowel syndrome), Nausea, Nosebleed, and UTI (lower urinary tract infection). Past Surgical History:  has a past surgical history that includes Appendectomy; eye surgery (Bilateral); and Carotid endarterectomy (Left, 8/22/2019). Social History:  reports that she quit smoking about 36 years ago. Her smoking use included cigarettes. She has never used smokeless tobacco. She reports that she does not drink alcohol and does not use drugs. Family History: family history includes Other in her father; Other (age of onset: 80) in her mother. The patients home medications have been reviewed. Allergies: Erythromycin      Review of Systems:   Pertinent positives and negatives are stated within HPI, all other systems reviewed and are negative.          ---------------------------------------------------PHYSICAL EXAM--------------------------------------    Constitutional/General: Alert and oriented x3, well appearing, non toxic in NAD  Head: Normocephalic and atraumatic  Eyes: PERRL, EOMI  Mouth: Oropharynx clear, handling secretions, no trismus.  no facial droop  Neck: Supple, full ROM, non tender to palpation in the midline, no stridor, no crepitus, no meningeal signs  Pulmonary: Lungs clear to auscultation bilaterally, no wheezes, rales, or rhonchi. Not in respiratory distress  Cardiovascular:  Regular rate. Regular rhythm. No murmurs, gallops, or rubs. 2+ distal pulses  Chest: no chest wall tenderness  Abdomen: Soft. Non tender. Non distended. +BS. No rebound, guarding, or rigidity. No pulsatile masses appreciated. Musculoskeletal: Moves all extremities x 4. Warm and well perfused, no clubbing, cyanosis, or edema. Capillary refill <3 seconds  Skin: warm and dry. No rashes. Neurologic: GCS 15, CN 2-12 grossly intact, no focal deficits, symmetric strength 5/5 in the upper and lower extremities bilaterally. Speech mild dysarthria and aphasia. Normal finger to nose. No drift. Please also see NIH stroke scale.   Psych: Normal Affect      -------------------------------------------------- RESULTS -------------------------------------------------  All laboratory and imaging studies have been reviewed by myself    LABS:  Results for orders placed or performed during the hospital encounter of 09/20/22   CBC with Auto Differential   Result Value Ref Range    WBC 5.3 4.5 - 11.5 E9/L    RBC 4.32 3.50 - 5.50 E12/L    Hemoglobin 12.3 11.5 - 15.5 g/dL    Hematocrit 38.4 34.0 - 48.0 %    MCV 88.9 80.0 - 99.9 fL    MCH 28.5 26.0 - 35.0 pg    MCHC 32.0 32.0 - 34.5 %    RDW 14.9 11.5 - 15.0 fL    Platelets 251 538 - 671 E9/L    MPV 10.2 7.0 - 12.0 fL    Neutrophils % 65.3 43.0 - 80.0 %    Immature Granulocytes % 0.2 0.0 - 5.0 %    Lymphocytes % 21.7 20.0 - 42.0 %    Monocytes % 9.2 2.0 - 12.0 %    Eosinophils % 2.8 0.0 - 6.0 %    Basophils % 0.8 0.0 - 2.0 %    Neutrophils Absolute 3.46 1.80 - 7.30 E9/L    Immature Granulocytes # 0.01 E9/L    Lymphocytes Absolute 1.15 (L) 1.50 - 4.00 E9/L    Monocytes Absolute 0.49 0.10 - 0.95 E9/L    Eosinophils Absolute 0.15 0.05 - 0.50 E9/L    Basophils stenosis of right PCA P2 segment. Multifocal areas of mild stenosis   of left PCA P1 segment. Ct perfusion: No infarct core. 27 cc focus of hypoperfusion within left   temporoparietal area. Critical results were called by Dr. Gopal Rodas MD to Dr. Reyes Limon  on   9/20/2022 at 18:26.               ------------------------- NURSING NOTES AND VITALS REVIEWED ---------------------------   The nursing notes within the ED encounter and vital signs as below have been reviewed. BP (!) 167/74   Pulse 77   Resp 18   Wt 150 lb (68 kg)   SpO2 94%   BMI 26.57 kg/m²   Oxygen Saturation Interpretation: Normal    The patients available past medical records and past encounters were reviewed. ------------------------------ ED COURSE/MEDICAL DECISION MAKING----------------------  Medications   sodium chloride flush 0.9 % injection 5-40 mL (has no administration in time range)   sodium chloride flush 0.9 % injection 5-40 mL (has no administration in time range)   0.9 % sodium chloride infusion (has no administration in time range)   dextrose 50 % IV solution (has no administration in time range)   hydrALAZINE (APRESOLINE) injection 5 mg (has no administration in time range)   iopamidol (ISOVUE-370) 76 % injection 100 mL (100 mLs IntraVENous Given 9/20/22 1753)   sodium chloride flush 0.9 % injection 10 mL (10 mLs IntraVENous Given 9/20/22 1753)     Followed by   tenecteplase (TNKASE) injection 17 mg (17 mg IntraVENous Given 9/20/22 1754)     Followed by   sodium chloride flush 0.9 % injection 10 mL ( IntraVENous Canceled Entry 9/20/22 1820)             Acute CVA Core Measures:   Last Known Well : 36  NIH Stroke Scale Total: 2  TNK Eligibility: Yes, IV TNK was Administered      Medical Decision Making:    Patient is an 57-year-old female past medical history of A. fib currently on anticoagulation as well as hypertension and hyperlipidemia. Patient elected to complain of possible stroke.   Patient found --------------------------------- IMPRESSION AND DISPOSITION ---------------------------------    IMPRESSION  1.  Cerebrovascular accident (CVA), unspecified mechanism [facility]        DISPOSITION  Disposition: Admit to CCU/ICU  Patient condition is serious           Bimal Keane DO  Resident  09/20/22 2020

## 2022-09-20 NOTE — ED NOTES
Time Neurologist page:1734      Time Stroke Alert called:  :6380    Time Neurologist called back:0974    X-Ray/CT notified: 3954     Makayla Chiu  09/20/22 8421

## 2022-09-20 NOTE — ED NOTES
Please call daughter with updates. Donna Walker [phone]     Christian Rubio RN  09/20/22 Nora Aguilar

## 2022-09-20 NOTE — ED NOTES
ER Clinical Pharmacy Note:    79yo Female presents to the ER @ [address] [place] [address] for stroke symptoms. Last known well = 1630  Arrival time = 1725  POCT glucose = 131  NIH = 2  Weight = 68 kg  Blood pressure = 178/81  TNKase dose = 17 mg at 1754  Door to needle time = 29 minutes    Patient and/or family was counseled on the risks/benefits of giving TNKase  They agree and wish to proceed.     Mohamud Chiang, PharmD 9/20/2022 6:21 PM  [phone]

## 2022-09-20 NOTE — ED NOTES
Nurse to nurse report called and given to [facility].       Christian Rubio RN  09/20/22 [id number]

## 2022-09-21 ENCOUNTER — APPOINTMENT (OUTPATIENT)
Dept: CT IMAGING | Age: 82
DRG: 062 | End: 2022-09-21
Payer: MEDICARE

## 2022-09-21 LAB
ANION GAP SERPL CALCULATED.3IONS-SCNC: 13 MMOL/L (ref 7–16)
BACTERIA: ABNORMAL /HPF
BILIRUBIN URINE: NEGATIVE
BLOOD, URINE: ABNORMAL
BUN BLDV-MCNC: 13 MG/DL (ref 6–23)
CALCIUM SERPL-MCNC: 8.8 MG/DL (ref 8.6–10.2)
CHLORIDE BLD-SCNC: 106 MMOL/L (ref 98–107)
CHOLESTEROL, TOTAL: 133 MG/DL (ref 0–199)
CLARITY: CLEAR
CO2: 18 MMOL/L (ref 22–29)
COLOR: YELLOW
CREAT SERPL-MCNC: 0.7 MG/DL (ref 0.5–1)
GFR AFRICAN AMERICAN: >60
GFR NON-AFRICAN AMERICAN: >60 ML/MIN/1.73
GLUCOSE BLD-MCNC: 112 MG/DL (ref 74–99)
GLUCOSE URINE: NEGATIVE MG/DL
HBA1C MFR BLD: 5.8 % (ref 4–5.6)
HCT VFR BLD CALC: 38.9 % (ref 34–48)
HDLC SERPL-MCNC: 49 MG/DL
HEMOGLOBIN: 13.1 G/DL (ref 11.5–15.5)
KETONES, URINE: NEGATIVE MG/DL
LDL CHOLESTEROL CALCULATED: 68 MG/DL (ref 0–99)
LEUKOCYTE ESTERASE, URINE: NEGATIVE
LV EF: 63 %
LVEF MODALITY: NORMAL
MAGNESIUM: 2 MG/DL (ref 1.6–2.6)
MCH RBC QN AUTO: 29.1 PG (ref 26–35)
MCHC RBC AUTO-ENTMCNC: 33.7 % (ref 32–34.5)
MCV RBC AUTO: 86.4 FL (ref 80–99.9)
NITRITE, URINE: NEGATIVE
PDW BLD-RTO: 14.7 FL (ref 11.5–15)
PH UA: 7.5 (ref 5–9)
PHOSPHORUS: 2.8 MG/DL (ref 2.5–4.5)
PLATELET # BLD: 158 E9/L (ref 130–450)
PMV BLD AUTO: 10 FL (ref 7–12)
POTASSIUM SERPL-SCNC: 3.4 MMOL/L (ref 3.5–5)
PROTEIN UA: NEGATIVE MG/DL
RBC # BLD: 4.5 E12/L (ref 3.5–5.5)
RBC UA: ABNORMAL /HPF (ref 0–2)
SODIUM BLD-SCNC: 137 MMOL/L (ref 132–146)
SPECIFIC GRAVITY UA: 1.01 (ref 1–1.03)
TRIGL SERPL-MCNC: 79 MG/DL (ref 0–149)
UROBILINOGEN, URINE: 0.2 E.U./DL
VLDLC SERPL CALC-MCNC: 16 MG/DL
WBC # BLD: 7.2 E9/L (ref 4.5–11.5)
WBC UA: ABNORMAL /HPF (ref 0–5)

## 2022-09-21 PROCEDURE — 83036 HEMOGLOBIN GLYCOSYLATED A1C: CPT

## 2022-09-21 PROCEDURE — 81001 URINALYSIS AUTO W/SCOPE: CPT

## 2022-09-21 PROCEDURE — 92507 TX SP LANG VOICE COMM INDIV: CPT | Performed by: SPEECH-LANGUAGE PATHOLOGIST

## 2022-09-21 PROCEDURE — 99221 1ST HOSP IP/OBS SF/LOW 40: CPT | Performed by: NURSE PRACTITIONER

## 2022-09-21 PROCEDURE — 93306 TTE W/DOPPLER COMPLETE: CPT

## 2022-09-21 PROCEDURE — 83735 ASSAY OF MAGNESIUM: CPT

## 2022-09-21 PROCEDURE — 85027 COMPLETE CBC AUTOMATED: CPT

## 2022-09-21 PROCEDURE — 80061 LIPID PANEL: CPT

## 2022-09-21 PROCEDURE — 2000000000 HC ICU R&B

## 2022-09-21 PROCEDURE — 80048 BASIC METABOLIC PNL TOTAL CA: CPT

## 2022-09-21 PROCEDURE — 6370000000 HC RX 637 (ALT 250 FOR IP): Performed by: NURSE PRACTITIONER

## 2022-09-21 PROCEDURE — 97530 THERAPEUTIC ACTIVITIES: CPT

## 2022-09-21 PROCEDURE — 84100 ASSAY OF PHOSPHORUS: CPT

## 2022-09-21 PROCEDURE — 92523 SPEECH SOUND LANG COMPREHEN: CPT | Performed by: SPEECH-LANGUAGE PATHOLOGIST

## 2022-09-21 PROCEDURE — 2580000003 HC RX 258: Performed by: EMERGENCY MEDICINE

## 2022-09-21 PROCEDURE — 97166 OT EVAL MOD COMPLEX 45 MIN: CPT

## 2022-09-21 PROCEDURE — 70450 CT HEAD/BRAIN W/O DYE: CPT

## 2022-09-21 PROCEDURE — 97161 PT EVAL LOW COMPLEX 20 MIN: CPT

## 2022-09-21 PROCEDURE — 36415 COLL VENOUS BLD VENIPUNCTURE: CPT

## 2022-09-21 RX ORDER — HYDRALAZINE HYDROCHLORIDE 20 MG/ML
10 INJECTION INTRAMUSCULAR; INTRAVENOUS EVERY 10 MIN PRN
Status: DISCONTINUED | OUTPATIENT
Start: 2022-09-21 | End: 2022-09-24 | Stop reason: HOSPADM

## 2022-09-21 RX ORDER — POTASSIUM CHLORIDE 20 MEQ/1
40 TABLET, EXTENDED RELEASE ORAL ONCE
Status: COMPLETED | OUTPATIENT
Start: 2022-09-21 | End: 2022-09-21

## 2022-09-21 RX ORDER — LABETALOL HYDROCHLORIDE 5 MG/ML
10 INJECTION, SOLUTION INTRAVENOUS EVERY 10 MIN PRN
Status: DISCONTINUED | OUTPATIENT
Start: 2022-09-21 | End: 2022-09-24 | Stop reason: HOSPADM

## 2022-09-21 RX ORDER — DORZOLAMIDE HYDROCHLORIDE AND TIMOLOL MALEATE 20; 5 MG/ML; MG/ML
1 SOLUTION/ DROPS OPHTHALMIC 2 TIMES DAILY
Status: DISCONTINUED | OUTPATIENT
Start: 2022-09-21 | End: 2022-09-21 | Stop reason: CLARIF

## 2022-09-21 RX ORDER — LATANOPROST 50 UG/ML
1 SOLUTION/ DROPS OPHTHALMIC NIGHTLY
Status: DISCONTINUED | OUTPATIENT
Start: 2022-09-21 | End: 2022-09-24 | Stop reason: HOSPADM

## 2022-09-21 RX ORDER — METOPROLOL SUCCINATE 25 MG/1
25 TABLET, EXTENDED RELEASE ORAL DAILY
Status: DISCONTINUED | OUTPATIENT
Start: 2022-09-21 | End: 2022-09-24 | Stop reason: HOSPADM

## 2022-09-21 RX ORDER — TIMOLOL MALEATE 5 MG/ML
1 SOLUTION/ DROPS OPHTHALMIC 2 TIMES DAILY
Status: DISCONTINUED | OUTPATIENT
Start: 2022-09-21 | End: 2022-09-24 | Stop reason: HOSPADM

## 2022-09-21 RX ORDER — ACETAMINOPHEN 325 MG/1
650 TABLET ORAL EVERY 4 HOURS PRN
Status: DISCONTINUED | OUTPATIENT
Start: 2022-09-21 | End: 2022-09-24 | Stop reason: HOSPADM

## 2022-09-21 RX ORDER — ATORVASTATIN CALCIUM 40 MG/1
40 TABLET, FILM COATED ORAL NIGHTLY
Status: DISCONTINUED | OUTPATIENT
Start: 2022-09-21 | End: 2022-09-24 | Stop reason: HOSPADM

## 2022-09-21 RX ORDER — DORZOLAMIDE HCL 20 MG/ML
1 SOLUTION/ DROPS OPHTHALMIC 2 TIMES DAILY
Status: DISCONTINUED | OUTPATIENT
Start: 2022-09-21 | End: 2022-09-24 | Stop reason: HOSPADM

## 2022-09-21 RX ADMIN — Medication 5 ML: at 20:51

## 2022-09-21 RX ADMIN — POTASSIUM CHLORIDE 40 MEQ: 1500 TABLET, EXTENDED RELEASE ORAL at 12:50

## 2022-09-21 RX ADMIN — LATANOPROST 1 DROP: 50 SOLUTION OPHTHALMIC at 20:51

## 2022-09-21 RX ADMIN — ATORVASTATIN CALCIUM 40 MG: 40 TABLET, FILM COATED ORAL at 20:51

## 2022-09-21 RX ADMIN — METOPROLOL SUCCINATE 25 MG: 25 TABLET, EXTENDED RELEASE ORAL at 08:35

## 2022-09-21 RX ADMIN — TIMOLOL MALEATE 1 DROP: 5 SOLUTION OPHTHALMIC at 20:51

## 2022-09-21 RX ADMIN — Medication 10 ML: at 08:38

## 2022-09-21 RX ADMIN — DORZOLAMIDE HYDROCHLORIDE 1 DROP: 20 SOLUTION/ DROPS OPHTHALMIC at 08:36

## 2022-09-21 RX ADMIN — DORZOLAMIDE HYDROCHLORIDE 1 DROP: 20 SOLUTION/ DROPS OPHTHALMIC at 20:51

## 2022-09-21 RX ADMIN — ACETAMINOPHEN 650 MG: 325 TABLET, FILM COATED ORAL at 16:44

## 2022-09-21 RX ADMIN — TIMOLOL MALEATE 1 DROP: 5 SOLUTION OPHTHALMIC at 08:36

## 2022-09-21 ASSESSMENT — PAIN SCALES - GENERAL
PAINLEVEL_OUTOF10: 0
PAINLEVEL_OUTOF10: 4
PAINLEVEL_OUTOF10: 0
PAINLEVEL_OUTOF10: 0

## 2022-09-21 ASSESSMENT — PAIN DESCRIPTION - FREQUENCY: FREQUENCY: INTERMITTENT

## 2022-09-21 ASSESSMENT — PAIN DESCRIPTION - ONSET: ONSET: GRADUAL

## 2022-09-21 ASSESSMENT — PAIN DESCRIPTION - ORIENTATION: ORIENTATION: RIGHT

## 2022-09-21 ASSESSMENT — PAIN DESCRIPTION - PAIN TYPE: TYPE: ACUTE PAIN

## 2022-09-21 ASSESSMENT — PAIN - FUNCTIONAL ASSESSMENT: PAIN_FUNCTIONAL_ASSESSMENT: ACTIVITIES ARE NOT PREVENTED

## 2022-09-21 ASSESSMENT — PAIN DESCRIPTION - DESCRIPTORS: DESCRIPTORS: ACHING;DISCOMFORT;DULL

## 2022-09-21 ASSESSMENT — PAIN DESCRIPTION - LOCATION: LOCATION: HEAD

## 2022-09-21 NOTE — PROGRESS NOTES
[address]       Date:2022                                                               Patient Name: Chen Milian  MRN: [id number]  : 1940  Room: [address] [place] [address]    Evaluating OT: William Rodriguezs, OTR/L [id number]    Referring Provider:ANTHONY Gay CNP    Specific Provider Orders/Date: OT eval and treat (22)       Diagnosis:   Acute CVA      Reason for admission: sudden onset of dysarthria, aphasia, facial droop and altered mental status; NIH 2    Surgery/Procedures:  TNK     Pertinent Medical History:  arthritis, Afib, carotid stenosis, glaucoma, blood clots, HTN, HLD, IBS, UTI     *Precautions:  Fall Risk, STM deficits, mild expressive aphasia, glaucoma     Assessment of current deficits   [x] Functional mobility  [x]ADLs  [x] Strength               [x]Cognition   [x] Functional transfers   [x] IADLs         [x] Safety Awareness   [x]Endurance   [] Fine Coordination        [] ROM     [] Vision/perception   []Sensation    []Gross Motor Coordination [x] Balance   [] Delirium                  [x]Motor Control     [x] Communication    OT PLAN OF CARE   OT POC based on physician orders, patient diagnosis and results of clinical assessment.        Frequency/Duration: 1-3 days/wk for 1-2 weeks PRN    Specific OT Treatment to include:   ADL retraining/adapted techniques and AE recommendations to increase functional independence within precautions                    Energy conservation techniques to improve tolerance for selfcare routine   Functional transfer/mobility training/DME recommendations for increased independence, safety and fall prevention         Patient/family education to increase safety and functional independence within precautions              Environmental modifications for safe mobility and completion of ADLs                           Cognitive retraining ex's to improve problem solving skills & safe participation in ADLs/IADLs  Sensory re-education techniques to improve extremity awareness, maintain skin integrity and improve hand function                             Visual/Perceptual retraining  to improve body awareness and safety during transfers and ADLs  Therapeutic activity to improve functional performance during ADLs/IADLs                                         Therapeutic exercise to improve tolerance and functional strength for ADLs   Balance retraining exercises/tasks for facilitation of postural control with dynamic challenges during ADLs . Positioning to improve functional independence  Neuromuscular re-education: facilitation of righting/equilibrium reactions, normalization muscle tone/facilitation active functional movement                      Delirium prevention/treatment    Modified Willacy Scale   Score     Description  0             No symptoms  1             No significant disability despite symptoms  2             Slight disability; able to look after own affairs  3             Moderate disability; able to ambulate without assist/ requires assist with ADLs  4             Moderate/Severe disability;requires assist to ambulate/assist with ADLs  5             Severe disability;bedridden/incontinent   6               Score:   4    Recommended Adaptive Equipment: TBA: WW     Home Living: Pt lives with daughter  in a 1 floor plan with 3 step(s) to enter and 1 rail(s); bed/bath on first floor  Bathroom setup: tub and walk in shower  Equipment owned: tub rail, shower seat, SPC    Prior Level of Function: IND with ADLs;  IND with IADLs. SPC PRN for ambulation. Driving: no  Occupation: retired      Pain Level: pt c/o 0/10  pain  this session    Cognition: A&O: 3/4  (pt not oriented to month and year despite frequent orientation throughout session)  Follows 1-2 step commands with min redirection.     Memory: 1/3 word recall with cues; pt reports she has been \"forgetting things\"    Comprehension fair   Problem solving: fair   Judgement/safety: fair               Communication skills: WFL with occasional word finding difficulties; slurred speech clearing           Vision: WFL; denies vision changes (h/o glaucoma)               Glasses:reading                                                   Hearing: WFL     RASS: 0  CAM-ICU: (NT) Delirium    UE Assessment:  Hand Dominance: Right [x]  Left []     ROM Strength STM goal: PRN   RUE  WFL   4-/5 4/5     LUE WFL 4-/5 4/5       Sensation: No c/o numbness/tingling in extremities. Tone: WNL   Edema: WFL     Functional Assessment:  AM-PAC Daily Activity Raw Score: 17/24   Initial Eval Status  Date: 9/21/22 Treatment Status  Date: STGs = LTGs  Time frame: 7-14 days   Feeding S; set up                        [place]  while seated up in chair to increase activity tolerance        Grooming Min A                        [place]   while standing sink level requiring AD for balance and demonstrating G tolerance; G safety     UB dressing/bathing Min A                       [place]       LB dressing/bathing Min A                        Bibiana  using AE as needed for safe reach/ energy conservation       Toileting NT                        [place]     Bed Mobility  Supine to sit:   Min A    Sit to supine:   NT                        [place]  in prep of ADL tasks & transfers   Functional Transfers Sit to stand:   Min A     Stand to sit:   Min A                        Bib[place]  sit<>stand/functional bathroom transfers using AD/DME as needed for balance and safety   Functional Mobility Min A [employer]                       Bibiana   functional/bathroom mobility using AD as needed & demonstrating G safety     Balance Sitting:     Static:  SBA    Dynamic:Min A  Standing: [place] [employer]  Bibiana dynamic sitting balance; Bib[place] dynamic standing balance  during ADL tasks & transfers   Endurance/  Activity Tolerance   F tolerance with light activity. G   tolerance with moderate activity/self care routine   Visual/  Perceptual   WFL                     Vitals:  Heart Rate at rest 78 bpm Heart Rate post session 83 bpm   SpO2 at rest -% SpO2 post session 98%   Blood Pressure at rest 161/65 mmHg Blood Pressure post session 144/68 mmHg          Treatment: OT treatment provided this date includes:  Balance retraining: Performed sitting/standing balance ex's with instruction to facilitate righting reactions with postural changes during ADLS. Pt provided with walker for support. ADL retraining: Instruction on adapted techniques to increase independence and safe reach during dressing/bathing activities. Pt demonstrated G understanding. Functional transfer training:  Instruction on postural cues, hand placement/sequencing, & walker safety  to assist with balance and fall prevention during self care routine/bathroom transfers. Energy conservation: Education on breathing techniques, pacing, work simplification strategies & recommended bathroom DME for safety and energy conservation during self care tasks and activities of daily living. Cognition/Perception: cognitive ex's and recommendation of using memory journal to recall date, events of day, medication schedule and appointments. Delirium Prevention: Environmental and sensory modifications assessed and implemented to decrease ICU acquired delirium and to improve overall orientation, mentation and pt interaction with family/staff. Line management and environmental modifications made prior to and end of session to ensure patient safety and to increase efficiency of session. Skilled monitoring of HR, O2 saturation, blood pressure and patient's response to activity performed throughout session. Comments: OK from RN to see patient. Upon arrival, patient supine in bed,agreeable to session. Pt demo fair tolerance with fair+/good understanding of education/techniques.   At end of session, patient left seated in chair to increase activity tolerance. Nurse aware. Call light within reach, all lines and tubes intact. Pt instructed on use of call light for assistance and fall prevention . Patient presents with decreased ROM/strength,activity tolerance, dynamic balance, functional mobility and cognitive deficits limiting completion of ADLs and safety. Pt can benefit from continued skilled OT to increase safety, functional independence and quality of life. Rehab Potential: Good for established goals    Patient / Family Goal: to return to PLOF    Patient and/or family were instructed/educated on diagnosis, prognosis/goals and plan of care. Pt demonstrated good understanding. Evaluation Complexity: Moderate     History: Expanded chart review of consults, imaging, and psychosocial history related to current functional performance. Exam: 5+ performance deficits identified limiting functional independence and safe return home   Assistance/Modification: Min/mod assistance or modifications required to perform tasks. May have comorbidities that affect occupational performance. [] Malnutrition indicators have been identified and nursing has been notified to ensure a dietitian consult is ordered. Time In:1300             Time Out: 1325         Total Treatment time: 10   Min Units   OT Eval Low 32991     OT Eval Medium 07329 X    OT Eval High 16082     OT Re-Eval [id number]     Therapeutic Ex 14351     Therapeutic Activities 23197 10 1   ADL/Self Care 42418     Orthotic Management 50679     Neuro Re-Ed 29550     Non-Billable Time        Evaluation time includes thorough review of current medical information, gathering information on past medical history/social history and prior level of function, completion of standardized testing/informal observation of tasks, assessment of data and development of POC/Goals.      Giorgi Zaman, OTR/L [id number]

## 2022-09-21 NOTE — PROGRESS NOTES
Physical Therapy  Physical Therapy Initial Assessment     Name: Parisa Muñiz  : 1940  MRN: [id number]      Date of Service: 2022    Evaluating PT:  Ashley Briceño PT, DPT [id number]    Room #:  0825/1337-F  Diagnosis:  Acute CVA (cerebrovascular accident) [facility]) [I63.9]  Cerebrovascular accident (CVA), unspecified mechanism ([facility] [I63.9]  PMHx/PSHx:    Past Medical History:   Diagnosis Date    Arthritis     Atrial fibrillation ([facility]     Carotid stenosis, right     Cerebral artery occlusion with cerebral infarction ([facility]     MEMORY ISSUES, GAIT ISSUES    Glaucoma     History of blood transfusion     rupture appendicitis    Hx of blood clots     developed in leg after appendectomy age 13 yrs     Hyperlipidemia     Hypertension     IBS (irritable bowel syndrome)     Nausea     Nosebleed 2019    in eor dr Jevon [place]ado left nostril packing    UTI (lower urinary tract infection)      Procedure/Surgery:   TNK  Precautions:  Falls, STM deficits, mild expressive aphasia  Equipment Needs:  TBD    SUBJECTIVE:    Pt lives with daughter in a 1 story home with 3 stairs to enter and 1 rail. Pt ambulated without device and was independent PTA. OBJECTIVE:   Initial Evaluation  Date: 22 Treatment Short Term/ Long Term   Goals   AM-PAC 6 Clicks 98/68     Was pt agreeable to Eval/treatment? Yes     Does pt have pain?  No c/o pain     Bed Mobility  Rolling: NT  Supine to sit: Ross[place]  Sit to supine: NT  Scooting: Rossana  Mod Independent   Transfers Sit to stand: Rossana  Stand to sit: Rossana  Stand pivot: Rossana with [employer]  Mod Independent with [employer]   Ambulation   70 feet with Rossana with [employer]  >400 feet with Mod Independent with [employer]    Stair negotiation: ascended and descended NT  >4 steps with 1 rail Mod Independent   ROM BUE:  Defer to OT note  BLE:  WFL     Strength BUE:  Defer to OT note  BLE:  4/5  Increase by 1/3 MMT grade   Balance Sitting EOB:  Rossana  Dynamic Standing:  Rossana with [employer]  Sitting EOB:  Independent  Dynamic Standing: Mod Independent with [employer]     Pt is A & O x 3 self, place and situation but not time  CAM-ICU: NT  RASS: 0  Sensation:  No reported paresthesias  Edema:  None    Vitals:  Heart Rate at rest 78 bpm Heart Rate post session 83 bpm   SpO2 at rest -% SpO2 post session 98%   Blood Pressure at rest 161/65 mmHg Blood Pressure post session 144/68 mmHg       Functional Status Score-Intensive Care Unit (FSS-ICU)   Rolling -/7   Supine to sit transfer 4/7   Unsupported sitting  4/7   Sit to stand transfers 4/7   Ambulation 2/7   Total  14/35       Therapeutic Exercises:  NA    Patient education  Pt educated on safety    Patient response to education:   Pt verbalized understanding Pt demonstrated skill Pt requires further education in this area   x x x     ASSESSMENT:    Conditions Requiring Skilled Therapeutic Intervention:    [x]Decreased strength     []Decreased ROM  [x]Decreased functional mobility  [x]Decreased balance   [x]Decreased endurance   [x]Decreased posture  []Decreased sensation  []Decreased coordination   []Decreased vision  []Decreased safety awareness   []Increased pain       Comments:  NP reported pt was medically stable. Pt was in bed upon arrival, agreeable to initial evaluation. Trunk assistance provided for bed mobility. Upon standing, pt demonstrated a posterior lean with flexed posture. Pt was given a [employer] to improve balance and safety. Pt ambulated with slight unsteadiness but no LOB noted. Fatigue limited activity. Pt was left in chair with all needs met and call light in reach. All lines remained intact. Pt would benefit from an intensive rehabilitation program at discharge. Treatment:  Patient practiced and was instructed in the following treatment:    Bed mobility training - pt given verbal and tactile cues to facilitate proper sequencing and safety during  supine>sit as well as provided with physical assistance.   Sitting EOB for >5 minutes for upright tolerance, postural awareness and BLE ROM  Transfer training - pt was given verbal and tactile cues to facilitate proper hand placement, technique and safety during sit to stand, stand to sit and stand pivot transfers as well as provided with physical assistance. Gait training- pt was given verbal and tactile cues to facilitate safety, balance, posture and use of WW during ambulation as well as provided with physical assistance. Pt's/ family goals   1. Return to PLOF    Prognosis is good for reaching above PT goals. Patient and or family understand(s) diagnosis, prognosis, and plan of care. yes    PHYSICAL THERAPY PLAN OF CARE:    PT POC is established based on physician order and patient diagnosis     Referring provider/PT Order:  ANTHONY Lawrence - CNP /09/21/22 0715 PT eval and treat  Diagnosis:  Acute CVA (cerebrovascular accident) ([facility] [address] [I63.9]  Cerebrovascular accident (CVA), unspecified mechanism ([facility] [address] [I63.9]  Specific instructions for next treatment:  Progress activity    Current Treatment Recommendations:     [x] Strengthening to improve independence with functional mobility   [] ROM to improve independence with functional mobility   [x] Balance Training to improve static/dynamic balance and to reduce fall risk  [x] Endurance Training to improve activity tolerance during functional mobility   [x] Transfer Training to improve safety and independence with all functional transfers   [x] Gait Training to improve gait mechanics, endurance and asses need for appropriate assistive device  [x] Stair Training in preparation for safe discharge home and/or into the community   [] Positioning to prevent skin breakdown and contractures  [x] Safety and Education Training   [x] Patient/Caregiver Education   [] HEP  [] Other     PT long term treatment goals are located in above grid    Frequency of treatments: 2-5x/week x 1-2 weeks.     Time in  1250  Time out  1315    Total Treatment Time  10 minutes     Evaluation Time includes thorough review of current medical information, gathering information on past medical history/social history and prior level of function, completion of standardized testing/informal observation of tasks, assessment of data and education on plan of care and goals.     CPT codes:  [x] Low Complexity PT evaluation 27096  [] Moderate Complexity PT evaluation 88861  [] High Complexity PT evaluation 02898  [] PT Re-evaluation 58475  [] Gait training 25441 - minutes  [] Manual therapy 86875 - minutes  [x] Therapeutic activities 13267 10 minutes  [] Therapeutic exercises 74874 - minutes  [] Neuromuscular reeducation 44634 - minutes     Lit Puga, PT, DPT  [id number]

## 2022-09-21 NOTE — PROGRESS NOTES
Interventional Neuro Follow up     Doing well, speech better but not baseline yet  Family and patient happy with improvement post TNK   Discussed need for anticoagulation , CHADS VASC of 7 with two thrombi in the left MCA territory - Benefits outweigh all risk in this situation , If she bleeds then the bleeding needs to be addressed and anticoagulation not stopped  NO need for intervention, WILL SIGN OFF,  NIHSS minimal 1.  Neurology to follow, Add MRI to determine timing of anticoagulation

## 2022-09-21 NOTE — PROGRESS NOTES
Assisted pt to bedside chair to void on pan, assisted back to bed , brief and pants removed and pure wick placed.

## 2022-09-21 NOTE — PLAN OF CARE
Problem: Discharge Planning  Goal: Discharge to home or other facility with appropriate resources  Outcome: Progressing     Problem: Skin/Tissue Integrity  Goal: Absence of new skin breakdown  Description: 1. Monitor for areas of redness and/or skin breakdown  2. Assess vascular access sites hourly  3. Every 4-6 hours minimum:  Change oxygen saturation probe site  4. Every 4-6 hours:  If on nasal continuous positive airway pressure, respiratory therapy assess nares and determine need for appliance change or resting period.   Outcome: Progressing     Problem: Safety - Adult  Goal: Free from fall injury  Outcome: Progressing     Problem: ABCDS Injury Assessment  Goal: Absence of physical injury  Outcome: Progressing     Problem: Neurosensory - Adult  Goal: Achieves stable or improved neurological status  Outcome: Progressing  Goal: Absence of seizures  Outcome: Progressing  Goal: Achieves maximal functionality and self care  Outcome: Progressing     Problem: Skin/Tissue Integrity - Adult  Goal: Skin integrity remains intact  Outcome: Progressing  Goal: Oral mucous membranes remain intact  Outcome: Progressing     Problem: Infection - Adult  Goal: Absence of infection at discharge  Outcome: Progressing  Goal: Absence of infection during hospitalization  Outcome: Progressing

## 2022-09-21 NOTE — PLAN OF CARE
Problem: Discharge Planning  Goal: Discharge to home or other facility with appropriate resources  9/21/2022 1939 by Tom Godwin RN  Outcome: Progressing  Flowsheets (Taken 9/21/2022 1900)  Discharge to home or other facility with appropriate resources: Identify barriers to discharge with patient and caregiver  9/21/2022 1102 by Clovis Velasco RN  Outcome: Progressing     Problem: Skin/Tissue Integrity  Goal: Absence of new skin breakdown  Description: 1. Monitor for areas of redness and/or skin breakdown  2. Assess vascular access sites hourly  3. Every 4-6 hours minimum:  Change oxygen saturation probe site  4. Every 4-6 hours:  If on nasal continuous positive airway pressure, respiratory therapy assess nares and determine need for appliance change or resting period.   9/21/2022 1939 by Tom Godwin RN  Outcome: Progressing  9/21/2022 1102 by Clovis Velasco RN  Outcome: Progressing     Problem: Safety - Adult  Goal: Free from fall injury  9/21/2022 1939 by Tom Godwin RN  Outcome: Progressing  Flowsheets (Taken 9/21/2022 1900)  Free From Fall Injury: Based on caregiver fall risk screen, instruct family/caregiver to ask for assistance with transferring infant if caregiver noted to have fall risk factors  9/21/2022 1102 by Clovis Velasco RN  Outcome: Progressing     Problem: ABCDS Injury Assessment  Goal: Absence of physical injury  9/21/2022 1939 by Tom Godwin RN  Outcome: Progressing  9/21/2022 1102 by Clovis Velasco RN  Outcome: Progressing     Problem: Neurosensory - Adult  Goal: Achieves stable or improved neurological status  9/21/2022 1939 by Tom Godwin RN  Outcome: Progressing  Flowsheets (Taken 9/21/2022 1900)  Achieves stable or improved neurological status:   Assess for and report changes in neurological status   Maintain blood pressure and fluid volume within ordered parameters to optimize cerebral perfusion and minimize risk of hemorrhage   Monitor temperature, glucose, and sodium.  Initiate appropriate interventions as ordered  9/21/2022 1102 by Martha Zapata RN  Outcome: Progressing  Goal: Absence of seizures  9/21/2022 1939 by Samantha Ba RN  Outcome: Progressing  9/21/2022 1102 by Martha Zapata RN  Outcome: Progressing  Goal: Achieves maximal functionality and self care  9/21/2022 1939 by Samantha Ba RN  Outcome: Progressing  Flowsheets (Taken 9/21/2022 1900)  Achieves maximal functionality and self care: Monitor swallowing and airway patency with patient fatigue and changes in neurological status  9/21/2022 1102 by Martha Zapata RN  Outcome: Progressing     Problem: Skin/Tissue Integrity - Adult  Goal: Skin integrity remains intact  9/21/2022 1939 by Samantha Ba RN  Outcome: Progressing  Flowsheets (Taken 9/21/2022 1900)  Skin Integrity Remains Intact: Monitor for areas of redness and/or skin breakdown  9/21/2022 1102 by Martha Zapata RN  Outcome: Progressing  Goal: Oral mucous membranes remain intact  9/21/2022 1939 by Samantha Ba RN  Outcome: Progressing  Flowsheets (Taken 9/21/2022 1900)  Oral Mucous Membranes Remain Intact:   Assess oral mucosa and hygiene practices   Implement preventative oral hygiene regimen  9/21/2022 1102 by Martha Zapata RN  Outcome: Progressing     Problem: Infection - Adult  Goal: Absence of infection at discharge  9/21/2022 1939 by Samantha Ba RN  Outcome: Progressing  Flowsheets (Taken 9/21/2022 1900)  Absence of infection at discharge:   Assess and monitor for signs and symptoms of infection   Monitor lab/diagnostic results  9/21/2022 1102 by Martha Zapata RN  Outcome: Progressing  Goal: Absence of infection during hospitalization  9/21/2022 1939 by Samantha Ba RN  Outcome: Progressing  Flowsheets (Taken 9/21/2022 1900)  Absence of infection during hospitalization:   Assess and monitor for signs and symptoms of infection   Monitor lab/diagnostic results  9/21/2022 1102 by Martha Zapata RN  Outcome: Progressing Problem: Pain  Goal: Verbalizes/displays adequate comfort level or baseline comfort level  Outcome: Progressing  Flowsheets (Taken 9/21/2022 1900)  Verbalizes/displays adequate comfort level or baseline comfort level:   Encourage patient to monitor pain and request assistance   Assess pain using appropriate pain scale   Administer analgesics based on type and severity of pain and evaluate response   Implement non-pharmacological measures as appropriate and evaluate response     Problem: Confusion  Goal: Confusion, delirium, dementia, or psychosis is improved or at baseline  Description: INTERVENTIONS:  1. Assess for possible contributors to thought disturbance, including medications, impaired vision or hearing, underlying metabolic abnormalities, dehydration, psychiatric diagnoses, and notify attending LIP  2. [place] high risk fall precautions, as indicated  3. Provide frequent short contacts to provide reality reorientation, refocusing and direction  4. Decrease environmental stimuli, including noise as appropriate  5. Monitor and intervene to maintain adequate nutrition, hydration, elimination, sleep and activity  6. If unable to ensure safety without constant attention obtain sitter and review sitter guidelines with assigned personnel  7.  Initiate Psychosocial CNS and Spiritual Care consult, as indicated  Outcome: Progressing

## 2022-09-21 NOTE — H&P
[facility]  History and Physical      CHIEF COMPLAINT:    No chief complaint on file. Patient of Fidelina Mccartney MD presents with:  Acute CVA (cerebrovascular accident) [facility])    History of Present Illness:   Patient is an 55-year-old female with a past medical history of atrial fibrillation not on anticoagulation, right carotid stenosis, glaucoma, DVT, HLD, HTN, IBS who presents to the emergency room for sudden onset of dysarthria, aphasia, facial droop and altered mental status. Patient's last known well was at 34 which was about an hour prior to arrival to the emergency room. Family and patient noticed that symptoms had been improving once in the emergency room. Patient had a series of CT scans which was negative for bleed and found to have a left MCA M2 occlusion. Telestroke was consulted and recommendations were for [facility] as her NIH was a 2 and did not meet requirements for endovascular treatment. Lab work was relatively unremarkable for patient. Patient was transferred to the ICU for closer monitoring and further work-up and treatment. On evaluation, she is resting comfortably - really able to answer most questions appropriately however per nursing has been agitated. She is alert awake oriented x2-does not know where she is however. Is able to follow commands appropriately. REVIEW OF SYSTEMS:  Pertinent negatives are above in HPI. 10 point ROS otherwise negative.       Past Medical History:   Diagnosis Date    Arthritis     Atrial fibrillation ([address]     Carotid stenosis, right     Cerebral artery occlusion with cerebral infarction ([facility])     MEMORY ISSUES, GAIT ISSUES    Glaucoma     History of blood transfusion 1953    rupture appendicitis    Hx of blood clots     developed in leg after appendectomy age 13 yrs     Hyperlipidemia     Hypertension     IBS (irritable bowel syndrome)     Nausea     Nosebleed 08/09/2019    in eor dr Jane Masterson left nostril packing    UTI (lower urinary tract infection)          Past Surgical History:   Procedure Laterality Date    APPENDECTOMY      CAROTID ENDARTERECTOMY Left 8/22/2019    LEFT CAROTID ENDARTERECTOMY performed by Jaskaran Brownlee MD at [address] [place] [address] Bilateral     cataracts       Medications Prior to Admission:    Medications Prior to Admission: ALPRAZolam (XANAX) 0.5 MG tablet, Take 0.5 mg by mouth nightly as needed for Sleep.  promethazine (PHENERGAN) 25 MG tablet, Take 25 mg by mouth every 6 hours as needed for Nausea  amLODIPine (NORVASC) 5 MG tablet, Take 5 mg by mouth at bedtime   lisinopril (PRINIVIL;ZESTRIL) 20 MG tablet, Take 20 mg by mouth daily  Cyanocobalamin (VITAMIN B 12 PO), Take by mouth  atorvastatin (LIPITOR) 40 MG tablet, Take 1 tablet by mouth nightly (Patient taking differently: Take 40 mg by mouth at bedtime)  metoprolol succinate (TOPROL XL) 25 MG extended release tablet, Take 1 tablet by mouth 2 times daily (Patient taking differently: Take 50 mg by mouth 2 times daily)  dorzolamide-timolol (COSOPT) 22.3-6.8 MG/ML ophthalmic solution, 1 drop 2 times daily  latanoprost (XALATAN) 0.005 % ophthalmic solution, 1 drop nightly    Note that the patient's home medications were reviewed and the above list is accurate to the best of my knowledge at the time of the exam.    Allergies:    Erythromycin    Social History:    reports that she quit smoking about 36 years ago. Her smoking use included cigarettes. She has never used smokeless tobacco. She reports that she does not drink alcohol and does not use drugs. Family History:   family history includes Other in her father; Other (age of onset: 80) in her mother.       PHYSICAL EXAM:    Vitals:  BP (!) 166/72   Pulse 82   Temp 98.6 °F (37 °C) (Temporal)   Resp 19   Wt 150 lb (68 kg)   SpO2 96%   BMI 26.57 kg/m²       General appearance: NAD, conversant  Eyes: Sclerae anicteric, PERRLA  HEENT: AT/NC, MMM  Neck: FROM, supple, no thyromegaly  Lymph: No cervical / Problem:    Acute CVA (cerebrovascular accident) [facility])  Active Problems:    Atrial fibrillation ([address]    Essential hypertension    Mixed hyperlipidemia  Resolved Problems:    * No resolved hospital problems. *    Patient is a 20-year-old female admitted to ICU for  Acute CVA left MCA territory with thrombus, not a candidate for intervention  -TNKase administered 9/20/2022 around 6 PM  -Monitor labs  -Check A1c and lipid panel  -We will need follow-up CT head 24 hours following TNKase administration  -MRI brain pending  -Continue statin  -Hold on anticoagulants and antiplatelets  -Neurology consult for ongoing management poststroke  -Echo with bubble completed, pending read    Atrial fibrillation not on [facility] at home  -Continue metoprolol  -Will need to be initiated on oral anticoagulation if family agreeable after out of post TN K window    Hypertension  -Continue home medications  -Monitor vital signs, BPs remain elevated      Medication for other comorbidities continue as appropriate dose adjustment as necessary.     DVT prophylaxis PCD  PT OT  Discharge planning    Code status: Full  Requires inpatient level of care    Domenico Mathis MD

## 2022-09-21 NOTE — CARE COORDINATION
9/21/2022 - Care Coordination - admitted for acute CVA, left MCA M2 occlusion. S/P t-PA administration on 9/20. Neuro stroke consulted in ED. CT head and MRI brain to be done. Echo done. PT/OT/SLP evals ordered. Spoke with pt and her son and daughter-in-law in room. PCP is Dr John Paul Cartagena. Pharmacy is [facility] in [facility]. Pt daughter lives with her in a 1 floor home with 3 steps to enter from garage. Independent in ADLs prior to admission. Hx [facility] 3 1/2 yrs ago - would use [facility] again if [address] ordered. Denies hx PENG/ARU. No DME. Plan is to discharge home when medically stable. Her family can provide transportation home. SW/CM will follow.

## 2022-09-21 NOTE — PROGRESS NOTES
Pt is impulsive with poor judgement, pt attempting to get oob x2. Attempts made to redirect and reorient to place and situation and to use call light when she needs help.bed alarm is on and side rails are up. Report to section RN.

## 2022-09-21 NOTE — PROGRESS NOTES
Neuro Science Intensive Care Unit  Critical Care Consult 9/21/2022      Date of Admission: 9/20\    CC: Aphasia    HPI: 57-year-old female with past medical history of carotid stenosis, CVA's, A. fib with anticoagulation was being held secondary to GI bleed presented to the ED with aphasia and facial droop. Work-up demonstrated a left MCA M2 occlusion, patient received TNK and was admitted for further care. Problem List:   Patient Active Problem List   Diagnosis    Posterior choroidal artery infarction [facility])    Persistent atrial fibrillation (HCC)    Essential hypertension    Mixed hyperlipidemia    Stenosis of left carotid artery    Bilateral carotid artery stenosis    GI bleed    Acute CVA (cerebrovascular accident) [facility])    Atrial fibrillation ([facility] [address]       Surgical/Interventional Procedures:   9/21: TNK, 1754    PMH:    has a past medical history of Arthritis, Atrial fibrillation ([facility] [address] Carotid stenosis, right, Cerebral artery occlusion with cerebral infarction ([facility] [address] Glaucoma, History of blood transfusion, Hx of blood clots, Hyperlipidemia, Hypertension, IBS (irritable bowel syndrome), Nausea, Nosebleed, and UTI (lower urinary tract infection). PSH:    has a past surgical history that includes Appendectomy; eye surgery (Bilateral); and Carotid endarterectomy (Left, 8/22/2019). Home Medications:   Prior to Admission medications    Medication Sig Start Date End Date Taking? Authorizing Provider   ALPRAZolam Johann Files) 0.5 MG tablet Take 0.5 mg by mouth nightly as needed for Sleep.     Historical Provider, MD   promethazine (PHENERGAN) 25 MG tablet Take 25 mg by mouth every 6 hours as needed for Nausea    Historical Provider, MD   amLODIPine (NORVASC) 5 MG tablet Take 5 mg by mouth at bedtime  8/30/19   Historical Provider, MD   lisinopril (PRINIVIL;ZESTRIL) 20 MG tablet Take 20 mg by mouth daily 8/27/19   Historical Provider, MD   Cyanocobalamin (VITAMIN B 12 PO) Take by mouth    Historical Provider, MD atorvastatin (LIPITOR) 40 MG tablet Take 1 tablet by mouth nightly  Patient taking differently: Take 40 mg by mouth at bedtime 19   Colton Baer MD   metoprolol succinate (TOPROL XL) 25 MG extended release tablet Take 1 tablet by mouth 2 times daily  Patient taking differently: Take 50 mg by mouth 2 times daily 19   Colton Baer MD   dorzolamide-timolol (COSOPT) 22.3-6.8 MG/ML ophthalmic solution 1 drop 2 times daily    Historical Provider, MD   latanoprost (XALATAN) 0.005 % ophthalmic solution 1 drop nightly    Historical Provider, MD       Allergies: Allergies   Allergen Reactions    Erythromycin Nausea And Vomiting       Social History:  Social History     Socioeconomic History    Marital status:      Spouse name: Not on file    Number of children: Not on file    Years of education: Not on file    Highest education level: Not on file   Occupational History    Not on file   Tobacco Use    Smoking status: Former     Types: Cigarettes     Quit date: 1986     Years since quittin.2    Smokeless tobacco: Never   Vaping Use    Vaping Use: Never used   Substance and Sexual Activity    Alcohol use: No    Drug use: No    Sexual activity: Not on file   Other Topics Concern    Not on file   Social History Narrative    Not on file     Social Determinants of Health     Financial Resource Strain: Not on file   Food Insecurity: Not on file   Transportation Needs: Not on file   Physical Activity: Not on file   Stress: Not on file   Social Connections: Not on file   Intimate Partner Violence: Not on file   Housing Stability: Not on file       Family History:   Family History   Problem Relation Age of Onset    Other Mother 80        heart failure     Other Father         multiple myeloma        VITAL SIGNS:   BP (!) 153/75   Pulse 76   Temp 98.6 °F (37 °C) (Oral)   Resp 14   Wt 150 lb (68 kg)   SpO2 99%   BMI 26.57 kg/m²     PHYSICAL EXAM:    General appearance:  Comfortable.    Pain Description: none  GCS:    4 - Opens eyes on own   6 - Follows simple motor commands  4 - Seems confused, disoriented    Pupil size:  Left 3 mm    Right 3 mm  Pupil reaction: Yes  Wiggles fingers: Left Yes Right Yes  Hand grasp:   Left normal    Right normal  Wiggles toes: Left Yes    Right Yes  Plantar flexion: Left normal   Right normal    CONSTITUTIONAL: no acute distress, lying in hospital bed. Alert and cooperative  NEUROLOGIC: PERRL, some expressive aphasia  CARDIOVASCULAR: S1 S2, irregular rate and rhythm, no murmur/gallop/rub. Monitor: A. fib  PULMONARY: no rhonchi/rales/wheezes, no use of accessory muscles, RA  RENAL: kenny to gravity, clear yellow urine  ABDOMEN: soft, nontender, nondistended, nontympanic, no masses, no organomegaly, normal bowel sounds   MUSCULOSKELETAL: moves all extremities purposefully, 5/5 strength   SKIN/EXTREMITIES: no rashes/ecchymosis, no edema/clubbing, warm/dry, good capillary refill     Assessment & Plan:       Neuro: Left MCA M2 occlusion status post TNK. Monitor neuro status, Neurology following, stroke protocol, Lipitor  CV: No acute issues. Monitor hemodynamics. BP goal less than 180/105. PRN hydralazine & labetalol. 2Decho. Home Toprol  Pulm: No acute issues monitor RR & SpO2. Pulmonary hygiene  GI: No acute issues. Speech-language and cognition diet. Monitor bowel function. Renal: Hypokalemia. BUN & Cr. Monitor electrolytes & replace as needed. Monitor urine output. ID: No acute issues  Endocrine: No acute issues. Monitor BS.    MSK: No acute issues. PT/OT. Heme: TNK induced coagulopathy TNK protocol    Bowel regime: Glycolax   Pain control/Sedation: Tylenol  DVT prophylaxis: SCDs.    No Lovenox/heparin until 24 hours after TNK  GI prophylaxis: Diet  Mouth/Eye care: Home glaucoma drops  Family update: Questions answered for patient  Code status: Full    Disposition: ICU     Time:  45 minutes       Electronically signed by ANTHONY Wiley CNP on 9/21/2022 at 10:05 AM

## 2022-09-22 LAB
ANION GAP SERPL CALCULATED.3IONS-SCNC: 13 MMOL/L (ref 7–16)
BUN BLDV-MCNC: 12 MG/DL (ref 6–23)
CALCIUM SERPL-MCNC: 8.9 MG/DL (ref 8.6–10.2)
CHLORIDE BLD-SCNC: 108 MMOL/L (ref 98–107)
CO2: 19 MMOL/L (ref 22–29)
CREAT SERPL-MCNC: 0.7 MG/DL (ref 0.5–1)
GFR AFRICAN AMERICAN: >60
GFR NON-AFRICAN AMERICAN: >60 ML/MIN/1.73
GLUCOSE BLD-MCNC: 98 MG/DL (ref 74–99)
HCT VFR BLD CALC: 37.8 % (ref 34–48)
HEMOGLOBIN: 13.1 G/DL (ref 11.5–15.5)
MAGNESIUM: 2.1 MG/DL (ref 1.6–2.6)
MCH RBC QN AUTO: 29.7 PG (ref 26–35)
MCHC RBC AUTO-ENTMCNC: 34.7 % (ref 32–34.5)
MCV RBC AUTO: 85.7 FL (ref 80–99.9)
PDW BLD-RTO: 14.7 FL (ref 11.5–15)
PHOSPHORUS: 3.1 MG/DL (ref 2.5–4.5)
PLATELET # BLD: 145 E9/L (ref 130–450)
PMV BLD AUTO: 9.5 FL (ref 7–12)
POTASSIUM SERPL-SCNC: 3.4 MMOL/L (ref 3.5–5)
RBC # BLD: 4.41 E12/L (ref 3.5–5.5)
SODIUM BLD-SCNC: 140 MMOL/L (ref 132–146)
WBC # BLD: 5.5 E9/L (ref 4.5–11.5)

## 2022-09-22 PROCEDURE — 6370000000 HC RX 637 (ALT 250 FOR IP): Performed by: NURSE PRACTITIONER

## 2022-09-22 PROCEDURE — 85027 COMPLETE CBC AUTOMATED: CPT

## 2022-09-22 PROCEDURE — 2580000003 HC RX 258: Performed by: EMERGENCY MEDICINE

## 2022-09-22 PROCEDURE — 97530 THERAPEUTIC ACTIVITIES: CPT

## 2022-09-22 PROCEDURE — 84100 ASSAY OF PHOSPHORUS: CPT

## 2022-09-22 PROCEDURE — 6370000000 HC RX 637 (ALT 250 FOR IP): Performed by: PSYCHIATRY & NEUROLOGY

## 2022-09-22 PROCEDURE — 99223 1ST HOSP IP/OBS HIGH 75: CPT | Performed by: NURSE PRACTITIONER

## 2022-09-22 PROCEDURE — 97535 SELF CARE MNGMENT TRAINING: CPT

## 2022-09-22 PROCEDURE — 6360000002 HC RX W HCPCS: Performed by: PSYCHIATRY & NEUROLOGY

## 2022-09-22 PROCEDURE — 2060000000 HC ICU INTERMEDIATE R&B

## 2022-09-22 PROCEDURE — 99222 1ST HOSP IP/OBS MODERATE 55: CPT | Performed by: PSYCHIATRY & NEUROLOGY

## 2022-09-22 PROCEDURE — 83735 ASSAY OF MAGNESIUM: CPT

## 2022-09-22 PROCEDURE — 80048 BASIC METABOLIC PNL TOTAL CA: CPT

## 2022-09-22 RX ORDER — HYDRALAZINE HYDROCHLORIDE 20 MG/ML
10 INJECTION INTRAMUSCULAR; INTRAVENOUS EVERY 4 HOURS PRN
Status: CANCELLED | OUTPATIENT
Start: 2022-09-22

## 2022-09-22 RX ORDER — ENOXAPARIN SODIUM 100 MG/ML
40 INJECTION SUBCUTANEOUS DAILY
Status: DISCONTINUED | OUTPATIENT
Start: 2022-09-22 | End: 2022-09-24 | Stop reason: HOSPADM

## 2022-09-22 RX ORDER — POTASSIUM CHLORIDE 20 MEQ/1
40 TABLET, EXTENDED RELEASE ORAL ONCE
Status: COMPLETED | OUTPATIENT
Start: 2022-09-22 | End: 2022-09-22

## 2022-09-22 RX ORDER — ASPIRIN 81 MG/1
81 TABLET, CHEWABLE ORAL DAILY
Status: DISCONTINUED | OUTPATIENT
Start: 2022-09-22 | End: 2022-09-24

## 2022-09-22 RX ORDER — LABETALOL HYDROCHLORIDE 5 MG/ML
10 INJECTION, SOLUTION INTRAVENOUS EVERY 4 HOURS PRN
Status: CANCELLED | OUTPATIENT
Start: 2022-09-22

## 2022-09-22 RX ADMIN — DORZOLAMIDE HYDROCHLORIDE 1 DROP: 20 SOLUTION/ DROPS OPHTHALMIC at 09:04

## 2022-09-22 RX ADMIN — ENOXAPARIN SODIUM 40 MG: 100 INJECTION SUBCUTANEOUS at 09:02

## 2022-09-22 RX ADMIN — TIMOLOL MALEATE 1 DROP: 5 SOLUTION OPHTHALMIC at 20:38

## 2022-09-22 RX ADMIN — TIMOLOL MALEATE 1 DROP: 5 SOLUTION OPHTHALMIC at 09:04

## 2022-09-22 RX ADMIN — LATANOPROST 1 DROP: 50 SOLUTION OPHTHALMIC at 21:14

## 2022-09-22 RX ADMIN — METOPROLOL SUCCINATE 25 MG: 25 TABLET, EXTENDED RELEASE ORAL at 09:02

## 2022-09-22 RX ADMIN — Medication 10 ML: at 20:39

## 2022-09-22 RX ADMIN — POTASSIUM CHLORIDE 40 MEQ: 1500 TABLET, EXTENDED RELEASE ORAL at 09:02

## 2022-09-22 RX ADMIN — ASPIRIN 81 MG 81 MG: 81 TABLET ORAL at 09:02

## 2022-09-22 RX ADMIN — DORZOLAMIDE HYDROCHLORIDE 1 DROP: 20 SOLUTION/ DROPS OPHTHALMIC at 20:39

## 2022-09-22 RX ADMIN — Medication 10 ML: at 09:03

## 2022-09-22 RX ADMIN — ATORVASTATIN CALCIUM 40 MG: 40 TABLET, FILM COATED ORAL at 20:43

## 2022-09-22 ASSESSMENT — PAIN SCALES - GENERAL
PAINLEVEL_OUTOF10: 0

## 2022-09-22 NOTE — CARE COORDINATION
9/22/2022 - Updated CM note - admitted for acute CVA, left MCA M2 occlusion. S/P t-PA administration on 9/20. Neuro stroke consulted in ED. Neurology consult. MRI brain to be done. PT 16/24, OT 17/24. Speech therapy recommends pt have speech therapy after discharge. Spoke with pt daughter, son and daughter-in-law in room - they want [facility] for speech therapy only, do not feel she needs anything else. Sent referral message to Emily Young at [facility]. Emily Young reports they do not have speech therapy available. Second choice is [facility]. [facility] and spoke with Andressa  they can accept. Plan is to discharge home with [facility] when medically stable. Her family can provide transportation home. SW/CM will follow. The Plan for Transition of Care is related to the following treatment goals: discharge planning when medically stable    The Patient and/or patient representative Mari Edmond was provided with a choice of provider and agrees   with the discharge plan. [x] Yes [] No    Freedom of choice list was provided with basic dialogue that supports the patient's individualized plan of care/goals, treatment preferences and shares the quality data associated with the providers.  [x] Yes [] No

## 2022-09-22 NOTE — PROGRESS NOTES
Physical Therapy  Physical Therapy Treatment Note    Name: Yudy Amaya  : 1940  MRN: [id number]      Date of Service: 2022    Evaluating PT:  Michael Hilliard, PT, DPT [id number]    Room #:  4771/0498-B  Diagnosis:  Acute CVA (cerebrovascular accident) [facility]) [I63.9]  Cerebrovascular accident (CVA), unspecified mechanism ([facility] [address] [I63.9]  PMHx/PSHx:    Past Medical History:   Diagnosis Date    Arthritis     Atrial fibrillation ([facility] [address]     Carotid stenosis, right     Cerebral artery occlusion with cerebral infarction ([facility] [address]     MEMORY ISSUES, GAIT ISSUES    Glaucoma     History of blood transfusion     rupture appendicitis    Hx of blood clots     developed in leg after appendectomy age 13 yrs     Hyperlipidemia     Hypertension     IBS (irritable bowel syndrome)     Nausea     Nosebleed 2019    in [place] [address] [place] left nostril packing    UTI (lower urinary tract infection)      Procedure/Surgery:   TNK  Precautions:  Falls, STM deficits, mild expressive aphasia, bed/chair alarm  Equipment Needs:  TBD    SUBJECTIVE:    Pt lives with daughter in a 1 story home with 3 stairs to enter and 1 rail. Pt ambulated without device and was independent PTA. OBJECTIVE:   Initial Evaluation  Date: 22 Treatment  22 Short Term/ Long Term   Goals   AM-PAC 6 Clicks  87/39    Was pt agreeable to Eval/treatment? Yes Yes    Does pt have pain?  No c/o pain No c/o pain    Bed Mobility  Rolling: NT  Supine to sit: Rossana  Sit to supine: NT  Scooting: Rossana Rolling: NT  Supine to sit: SBA  Sit to supine: NT  Scooting: SBA Mod Independent   Transfers Sit to stand: Rossana  Stand to sit: Rossana  Stand pivot: Rossana with [employer] Sit to stand: Rossana  Stand to sit: Rossana  Stand pivot: Rossana with [employer] Mod Independent with [employer]   Ambulation   70 feet with Rossana with [employer] 70 feet x 2 reps with Rossana with [employer] >400 feet with Mod Independent with [employer]    Stair negotiation: ascended and descended NT NT >4 steps with 1 rail Mod Independent   ROM BUE:  Defer to OT note  BLE:  WFL     Strength BUE:  Defer to OT note  BLE:  4/5  Increase by 1/3 MMT grade   Balance Sitting EOB:  Rossana  Dynamic Standing:  Rossana with [employer] Sitting EOB:  Rossana  Dynamic Standing:  Rossana with [employer] Sitting EOB:  Independent  Dynamic Standing: Mod Independent with [employer]     Pt is A & O x 1 self  CAM-ICU: NT  RASS: 0  Sensation:  No reported paresthesias  Edema:  None    Vitals:  Heart Rate at rest 87 bpm Heart Rate post session 90 bpm   SpO2 at rest -% SpO2 post session -%   Blood Pressure at rest 159/79 mmHg Blood Pressure post session 159/89 mmHg       Functional Status Score-Intensive Care Unit (FSS-ICU)   Rolling -/7   Supine to sit transfer 4/7   Unsupported sitting  4/7   Sit to stand transfers 4/7   Ambulation 4/7   Total  16/35       Therapeutic Exercises:  NA    Patient education  Pt educated on safety    Patient response to education:   Pt verbalized understanding Pt demonstrated skill Pt requires further education in this area   x x x     ASSESSMENT:    Conditions Requiring Skilled Therapeutic Intervention:    [x]Decreased strength     []Decreased ROM  [x]Decreased functional mobility  [x]Decreased balance   [x]Decreased endurance   [x]Decreased posture  []Decreased sensation  []Decreased coordination   []Decreased vision  []Decreased safety awareness   []Increased pain       Comments:  NP reported pt was medically stable. Pt was in bed upon arrival, agreeable to treatment session. Pt was more confused and required reorientation to place, time and situation. Poor safety noted with transfers as pt did not properly reach back for chair. Some learning noted with cues on following repetition. Pt ambulated with flexed posture and decreased gait speed but no major LOBs. Pt was left in chair with all needs met and call light in reach. All lines remained intact and chair alarm.   Discussed with MEERA Sanderson that pt may benefit from an intensive rehabilitation program.     Treatment: Patient practiced and was instructed in the following treatment:    Bed mobility training - pt given verbal and tactile cues to facilitate proper sequencing and safety during  supine>sit as well as provided with physical assistance. Sitting EOB for >5 minutes for upright tolerance, postural awareness and BLE ROM  Transfer training - pt was given verbal and tactile cues to facilitate proper hand placement, technique and safety during sit to stand, stand to sit and stand pivot transfers as well as provided with physical assistance. Gait training- pt was given verbal and tactile cues to facilitate safety, balance, posture and use of WW during ambulation as well as provided with physical assistance. PLAN:    Patient is making good progress towards established goals. Will continue with current POC.       Time in  1329  Time out  1355    Total Treatment Time  26 minutes     CPT codes:  [] Gait training 64288 - minutes  [] Manual therapy 78530 - minutes  [x] Therapeutic activities 53989 26 minutes  [] Therapeutic exercises 99962 - minutes  [] Neuromuscular reeducation 27687 - minutes    Sheryl Dinh, PT, DPT  [id number]

## 2022-09-22 NOTE — PROGRESS NOTES
[facility]                                Progress note    Subjective:    Awaiting MRI   No acute complaints, resting comfortably  Less agitated today    Objective:    BP (!) 159/89   Pulse 88   Temp 97.5 °F (36.4 °C) (Oral)   Resp 22   Ht 5' 2.99\" (1.6 m)   Wt 149 lb 14.6 oz (68 kg)   SpO2 97%   BMI 26.56 kg/m²     In: 240 [P.O.:240]  Out: 1350   In: 240   Out: 1350 [Urine:1350]    General appearance: NAD, conversant  HEENT: AT/NC, MMM  Neck: FROM, supple  Lungs: Clear to auscultation  CV: irregular, no MRGs  Vasc: Radial pulses 2+  Abdomen: Soft, non-tender; no masses or HSM  Extremities: No peripheral edema or digital cyanosis  Skin: no rash, lesions or ulcers  Psych: Alert and oriented to person, place and time  Neuro: Alert and interactive-no focal deficits     Recent Labs     09/20/22  1733 09/21/22  0802 09/22/22  0557   WBC 5.3 7.2 5.5   HGB 12.3 13.1 13.1   HCT 38.4 38.9 37.8    158 145       Recent Labs     09/20/22  1733 09/21/22  0802 09/22/22  0557    137 140   K 3.7 3.4* 3.4*    106 108*   CO2 20* 18* 19*   BUN 18 13 12   CREATININE 1.0 0.7 0.7   CALCIUM 9.1 8.8 8.9       Assessment:    Principal Problem:    Acute CVA (cerebrovascular accident) ([facility] [address]  Active Problems:    Atrial fibrillation (HCC)    Essential hypertension    Mixed hyperlipidemia  Resolved Problems:    * No resolved hospital problems.  *      Plan:  Patient is a 76-year-old female admitted to ICU for  Acute CVA left MCA territory with thrombus, not a candidate for intervention  -TNKase administered 9/20/2022 around 6 PM  -Monitor labs  -Check A1c and lipid panel  -follow-up CT head 24 hours following TNKase administration > negative   -MRI brain still pending  -Continue statin  -Ok to start ASA today and DVT proph   -Neurology consult for ongoing management poststroke  -Echo with bubble > negative for PFO, EF 60-65%     Atrial fibrillation not on [address] at home  -Continue metoprolol  -Will need to be initiated on oral anticoagulation if family agreeable after out of post TNK window  -However patient is intolerant of anticoagulation due to GI bleeds and epistaxis on several occasions, secondary to this recommendation is made for watchman procedure either here or in [place]-family agreeable to either     Hypertension  -Continue home medications, metoprolol 25 mg daily   -Monitor vital signs, Bps still remain elevated, may need additional antihypertensive     Mild Hypokalemia  -Monitor labs  -K+ 3.4   -Potassium protocol      DVT Prophylaxis Lovenox  PT/OT  Discharge planning   Was discussed with family at bedside  Marline Balderrama MD

## 2022-09-22 NOTE — PROGRESS NOTES
[address] [id number] [address]       Date:2022                                                               Patient Name: Se Martinez  MRN: [id number]  : 1940  Room: [address] [place] [address]    Evaluating OT: Edyta Pace OTR/L [id number]    Referring Provider:ANTHONY Lowry CNP    Specific Provider Orders/Date: OT eval and treat (22)       Diagnosis: Acute CVA      Reason for admission: sudden onset of dysarthria, aphasia, facial droop and altered mental status; NIH 2    Surgery/Procedures:  TNK     Pertinent Medical History:  arthritis, Afib, carotid stenosis, glaucoma, blood clots, HTN, HLD, IBS, UTI     *Precautions:  Fall Risk, STM deficits, mild expressive aphasia, glaucoma     Assessment of current deficits   [x] Functional mobility  [x]ADLs  [x] Strength               [x]Cognition   [x] Functional transfers   [x] IADLs         [x] Safety Awareness   [x]Endurance   [] Fine Coordination        [] ROM     [] Vision/perception   []Sensation    []Gross Motor Coordination [x] Balance   [] Delirium                  [x]Motor Control     [x] Communication    OT PLAN OF CARE   OT POC based on physician orders, patient diagnosis and results of clinical assessment.        Frequency/Duration: 1-3 days/wk for 1-2 weeks PRN    Specific OT Treatment to include:   ADL retraining/adapted techniques and AE recommendations to increase functional independence within precautions                    Energy conservation techniques to improve tolerance for selfcare routine   Functional transfer/mobility training/DME recommendations for increased independence, safety and fall prevention         Patient/family education to increase safety and functional independence within precautions              Environmental modifications for safe mobility and completion of ADLs                           Cognitive retraining ex's to improve problem solving skills & safe participation in ADLs/IADLs  Sensory re-education techniques to improve extremity awareness, maintain skin integrity and improve hand function                             Visual/Perceptual retraining  to improve body awareness and safety during transfers and ADLs  Therapeutic activity to improve functional performance during ADLs/IADLs                                         Therapeutic exercise to improve tolerance and functional strength for ADLs   Balance retraining exercises/tasks for facilitation of postural control with dynamic challenges during ADLs . Positioning to improve functional independence  Neuromuscular re-education: facilitation of righting/equilibrium reactions, normalization muscle tone/facilitation active functional movement                      Delirium prevention/treatment    Modified [place] Scale   Score     Description  0             No symptoms  1             No significant disability despite symptoms  2             Slight disability; able to look after own affairs  3             Moderate disability; able to ambulate without assist/ requires assist with ADLs  4             Moderate/Severe disability;requires assist to ambulate/assist with ADLs  5             Severe disability;bedridden/incontinent   6               Score:   4    Recommended Adaptive Equipment: TBA: WW     Home Living: Pt lives with daughter  in a 1 floor plan with 3 step(s) to enter and 1 rail(s); bed/bath on first floor  Bathroom setup: tub and walk in shower  Equipment owned: tub rail, shower seat, SPC    Prior Level of Function: IND with ADLs;  IND with IADLs. SPC PRN for ambulation. Driving: no  Occupation: retired      Pain Level: pt c/o 0/10  pain  this session    Cognition: A&O: 1/4; pt oriented to self only stating it is 1226 and she is in [employer]. Follows 1-2 step commands with min redirection.     Memory: F-; pt unable to recall events of this AM.   Comprehension fair   Problem solving: fair; intermittent verbal cues for simple problems during ADLs. Judgement/safety: fair               Communication skills: WFL demos improved word finding this date. Vision: [facility]; denies vision changes (h/o glaucoma)               Glasses:reading                                                   Hearing: WFL     RASS: 0  CAM-ICU: (NT) Delirium    UE Assessment:  Hand Dominance: Right [x]  Left []     ROM Strength STM goal: PRN   RUE  WFL   4-/5 4/5     LUE WFL 4-/5 4/5       Sensation: No c/o numbness/tingling in extremities. Tone: WNL   Edema: [facility]     Functional Assessment:  AM-PAC Daily Activity Raw Score: 17/24   Initial Eval Status  Date: 9/21/22 Treatment Status  Date: 9/22/22 STGs = LTGs  Time frame: 7-14 days   Feeding S; set up S; Set-up                       Bibiana  while seated up in chair to increase activity tolerance        Grooming Min A SBA  While standing at sink to brush teeth. Pt requiring min verbal cues for attention/initiation. Pt intermittently confused during ADL. Bibiana   while standing sink level requiring AD for balance and demonstrating G tolerance; G safety     UB dressing/bathing Min A Min A                      Bibiana       LB dressing/bathing Min A Min A  To don pants in supported sitting/to pull up in standing. Pt educated on alternating hand technique to maximize safety during dressing task.                          Bibiana  using AE as needed for safe reach/ energy conservation       Toileting NT NT                       Bibiana     Bed Mobility  Supine to sit:   Min A    Sit to supine:   NT Supine to sit:   SBA    Sit to supine:   NT                       Bibiana  in prep of ADL tasks & transfers   Functional Transfers Sit to stand:   Min A     Stand to sit:   Min A Sit to stand:   Min A     Stand to sit:   Min A                       Bibiana  sit<>stand/functional bathroom transfers using AD/DME as needed for overall orientation, mentation and pt interaction with family/staff. Line management and environmental modifications made prior to and end of session to ensure patient safety and to increase efficiency of session. Skilled monitoring of HR, O2 saturation, blood pressure and patient's response to activity performed throughout session. Comments: OK from RN to see patient. Upon arrival, patient semi supine in bed, agreeable to session. Pt demo fair tolerance with fair+ understanding of education/techniques. Pt demos impaired STM this session stating, \"I had family here this morning and I totally forget what happened, but I remember every word! \" At end of session, patient left seated in chair to increase activity tolerance. Nurse aware. Call light within reach, all lines and tubes intact. Pt instructed on use of call light for assistance and fall prevention .     Time In: 1340             Time Out: 1405         Total Treatment time: 25   Min Units   OT Eval Low 55601     OT Eval Medium 30103     OT Eval High [id number]     OT Re-Eval [id number]     Therapeutic Ex [phone]     Therapeutic Activities 14139 45 5   ADL/Self Care 91645 10 1   Orthotic Management 90491     Neuro Re-Ed 75993     Non-Billable Time        [employer] [address] [facility] [place], OTR/L, [id number]

## 2022-09-22 NOTE — CONSULTS
[facility]  Neurology Consult    Date:  9/22/2022  Patient Name:  Leighann Wild  YOB: 1940  MRN: [id number]     PCP:  Aspen Stovall MD   Referring:  No ref. provider found      Chief Complaint: Aphasia    History obtained from: Patient and family    Assessment  Leighann Wild is a 78 y.o. female with a past medical history significant for A. fib not on anticoagulation, right carotid artery stenosis, DVT, hyperlipidemia, and hypertension who presented to the ED with a stroke with symptoms of dysarthria aphasia and facial droop now s/p TNKase. Symptoms of aphasia dysarthria and facial droop are now almost completely resolved with some residual right-sided weakness. Plan  Consult cardiology for possible watchman (hx of bleeding on anticoagulation)  MRI pending         History of Present Illness:  Leighann Wild is a 78 y.o. right handed female presenting for evaluation of stoke now s/p TKNase. She presented with symptoms of aphasia, dysarthria, facial droop, and altered mental status per family. She was brought immediately to the hospital after symptom onset. In the ED, she received TKN. Symptoms have been improving and are now almost completely resolved. She has a history of Protein S deficiency, and afib not on anticoagulation. She has a history of nose bleeds and rectal bleeding when she was on elquis. She was then on plavix and continued to have nosebleeds, so she had nasal embolization and packing which resolved the epistaxis. She has no complaints at this time.            Review of Systems:  Unable to obtain review of symptoms due to underlying dementia    Medical History:   Past Medical History:   Diagnosis Date    Arthritis     Atrial fibrillation ([address]     Carotid stenosis, right     Cerebral artery occlusion with cerebral infarction ([address]     MEMORY ISSUES, GAIT ISSUES    Glaucoma     History of blood transfusion 1953    rupture appendicitis    Hx of blood clots developed in leg after appendectomy age 13 yrs     Hyperlipidemia     Hypertension     IBS (irritable bowel syndrome)     Nausea     Nosebleed 2019    in eor dr Bruno Carpenter left nostril packing    UTI (lower urinary tract infection)         Surgical History:   Past Surgical History:   Procedure Laterality Date    APPENDECTOMY      CAROTID ENDARTERECTOMY Left 2019    LEFT CAROTID ENDARTERECTOMY performed by Deandra Oropeza MD at [address] [place] [address] 37 Bilateral     cataracts        Family History:   Family History   Problem Relation Age of Onset    Other Mother 80        heart failure     Other Father         multiple myeloma        Social History:  Social History     Tobacco Use    Smoking status: Former     Types: Cigarettes     Quit date: 1986     Years since quittin.2    Smokeless tobacco: Never   Vaping Use    Vaping Use: Never used   Substance Use Topics    Alcohol use: No    Drug use: No        Current Medications:      Current Facility-Administered Medications   Medication Dose Route Frequency Provider Last Rate Last Admin    aspirin chewable tablet 81 mg  81 mg Oral Daily Mo Whitman MD   81 mg at 22    enoxaparin (LOVENOX) injection 40 mg  40 mg SubCUTAneous Daily Mo Whitman MD   40 mg at 22    hydrALAZINE (APRESOLINE) injection 10 mg  10 mg IntraVENous Q10 Min PRN Belkys Patrick APRN - CNP        labetalol (NORMODYNE;TRANDATE) injection 10 mg  10 mg IntraVENous Q10 Min PRN Belkys Patrick APRN - CNP        atorvastatin (LIPITOR) tablet 40 mg  40 mg Oral Nightly Belkys Patrick, APRN - CNP   40 mg at 22    metoprolol succinate (TOPROL XL) extended release tablet 25 mg  25 mg Oral Daily Belkys Patrick APRN - CNP   25 mg at 22    latanoprost (XALATAN) 0.005 % ophthalmic solution 1 drop  1 drop Both Eyes Nightly Belkys Patrick APRN - CNP   1 drop at 22    dorzolamide (TRUSOPT) 2 % ophthalmic solution 1 drop  1 drop Both Eyes BID Omelia Rhein, APRN - CNP   1 drop at 09/22/22 3118    And    timolol (TIMOPTIC) 0.5 % ophthalmic solution 1 drop  1 drop Both Eyes BID Omelia Rhein, APRN - CNP   1 drop at 09/22/22 9978    perflutren lipid microspheres (DEFINITY) injection 1.65 mg  1.5 mL IntraVENous ONCE PRN Omelia Rhein, APRN - CNP        acetaminophen (TYLENOL) tablet 650 mg  650 mg Oral Q4H PRN Clara Pollen, APRN - CNP   650 mg at 09/21/22 1644    sodium chloride flush 0.9 % injection 5-40 mL  5-40 mL IntraVENous 2 times per day Breann Lias, DO   10 mL at 09/22/22 1483    sodium chloride flush 0.9 % injection 5-40 mL  5-40 mL IntraVENous PRN Patience Ho, DO        0.9 % sodium chloride infusion   IntraVENous PRN Breann Lias, DO            Allergies: Allergies   Allergen Reactions    Erythromycin Nausea And Vomiting        Physical Examination  Vitals   Vitals:    09/22/22 0500 09/22/22 0700 09/22/22 0800 09/22/22 0900   BP: (!) 146/72  (!) 156/88 (!) 163/119   Pulse: 74 71 86 79   Resp: 14 11 19 16   Temp: 97.9 °F (36.6 °C)  97.6 °F (36.4 °C)    TempSrc: Oral  Oral    SpO2: 97%  99% 98%   Weight:       Height:            General: Patient appears in no acute distress. Awake and oriented x 1. HEENT: Normocephalic, atraumatic  Chest: Normal work of breathing  Extremities/Peripheral vascular: No edema/swelling noted. No cold limbs noted. Neurologic Examination    Mental Status  Alert, and oriented to person, place and time. Speech is fluent with intact comprehension. No evidence of memory impairment. Attention and concentration appeared normal.     Cranial Nerves  II. Visual fields full to confrontation bilaterally. III, IV, VI: Pupils equally round and reactive to light, 3 to 2 mm bilaterally. EOMs: full, no nystagmus. V. Facial sensation intact to light touch bilaterally  VII: Facial movements symmetric and strong  VIII: Hearing intact to voice  IX,X: Palate elevates symmetrically.  No dysarthria  XI: Sternocleidomastoid and trapezius 5/5 bilaterally   XII: Tongue is midline    Motor     Right Left   Right Left   Deltoid 5 5  Hip Flexion 4 5   Biceps      4  5  Knee Extension 5 5   Triceps 5 5  Knee Flexion 5 5   Handgrip 5 5  Ankle Dorsiflexion 5 5       Ankle Plantarflexion 5 5     Tone: Normal in all four limbs    Bulk: Normal in all four limbs with no evidence of atrophy    Pronator drift: absent bilaterally    Sensation  Light Touch: Intact distally in all four limbs    Reflexes     Right Left   Biceps 3 3   Brachioradialis 3 2   Triceps 2 2   Patellar 3+ 2   Achilles 2 2   ankle clonus none none   Babinski absent absent       Gait  Deferred for safety/fall consideration      Labs  Recent Labs     09/20/22  1733 09/20/22  1733 09/21/22  0802 09/22/22  0557     --  137 140   K 3.7   < > 3.4* 3.4*     --  106 108*   CO2 20*  --  18* 19*   BUN 18  --  13 12   CREATININE 1.0  --  0.7 0.7   GLUCOSE 133*  --  112* 98   CALCIUM 9.1  --  8.8 8.9   PROT 7.8  --   --   --    LABALBU 4.5  --   --   --    BILITOT 0.8  --   --   --    ALKPHOS 111*  --   --   --    AST 17  --   --   --    ALT 12  --   --   --    WBC 5.3  --  7.2 5.5   RBC 4.32  --  4.50 4.41   HGB 12.3  --  13.1 13.1   HCT 38.4  --  38.9 37.8   MCV 88.9  --  86.4 85.7   MCH 28.5  --  29.1 29.7   MCHC 32.0  --  33.7 34.7*   RDW 14.9  --  14.7 14.7     --  158 145   MPV 10.2  --  10.0 9.5   LACTA 1.4  --   --   --    HDL  --   --  49  --    LDLCALC  --   --  68  --    LABA1C  --   --  5.8*  --     < > = values in this interval not displayed. Imaging  CT HEAD WO CONTRAST   Final Result   No acute intracranial hemorrhage or mass effect. Chronic microvascular ischemic changes. XR CHEST PORTABLE   Final Result   No acute process. CT Head WO Contrast   Final Result   Head CT: No acute intracranial abnormality. No acute territorial infarction,   intracranial hemorrhage or mass lesion. Moderate generalized volume loss.   Mild chronic microangiopathic ischemic   disease. Cta neck: Mild 20% stenosis at the origin right cervical ICA. Cta head: sudden cut off of the superior cortical branch of left MCA within   left sylvian fissure. Moderate stenosis of right PCA P2 segment. Multifocal areas of mild stenosis   of left PCA P1 segment. Ct perfusion: No infarct core. 27 cc focus of hypoperfusion within left   temporoparietal area. Critical results were called by Dr. Mendez Gannon MD to Dr. Mónica Fitzgerald  on   9/20/2022 at 18:26. CTA HEAD W CONTRAST   Final Result   Head CT: No acute intracranial abnormality. No acute territorial infarction,   intracranial hemorrhage or mass lesion. Moderate generalized volume loss. Mild chronic microangiopathic ischemic   disease. Cta neck: Mild 20% stenosis at the origin right cervical ICA. Cta head: sudden cut off of the superior cortical branch of left MCA within   left sylvian fissure. Moderate stenosis of right PCA P2 segment. Multifocal areas of mild stenosis   of left PCA P1 segment. Ct perfusion: No infarct core. 27 cc focus of hypoperfusion within left   temporoparietal area. Critical results were called by Dr. Mendez Gannon MD to Dr. Mónica Fitzgerald  on   9/20/2022 at 18:26. CTA NECK W CONTRAST   Final Result   Head CT: No acute intracranial abnormality. No acute territorial infarction,   intracranial hemorrhage or mass lesion. Moderate generalized volume loss. Mild chronic microangiopathic ischemic   disease. Cta neck: Mild 20% stenosis at the origin right cervical ICA. Cta head: sudden cut off of the superior cortical branch of left MCA within   left sylvian fissure. Moderate stenosis of right PCA P2 segment. Multifocal areas of mild stenosis   of left PCA P1 segment. Ct perfusion: No infarct core. 27 cc focus of hypoperfusion within left   temporoparietal area.       Critical results were called by Dr. Albaro Peters Joel Torrez MD to Dr. Armando Hobbs  on   9/20/2022 at 18:26. CT BRAIN PERFUSION   Final Result   Head CT: No acute intracranial abnormality. No acute territorial infarction,   intracranial hemorrhage or mass lesion. Moderate generalized volume loss. Mild chronic microangiopathic ischemic   disease. Cta neck: Mild 20% stenosis at the origin right cervical ICA. Cta head: sudden cut off of the superior cortical branch of left MCA within   left sylvian fissure. Moderate stenosis of right PCA P2 segment. Multifocal areas of mild stenosis   of left PCA P1 segment. Ct perfusion: No infarct core. 27 cc focus of hypoperfusion within left   temporoparietal area. Critical results were called by Dr. Nany Mcdaniel MD to Dr. Armando Hobbs  on   9/20/2022 at 18:26.          MRI BRAIN WO CONTRAST    (Results Pending)         I have personally reviewed the following images: CT head, CTA, and CTP      Electronically signed by López Herrera MD on 9/22/2022 at 9:56 AM

## 2022-09-22 NOTE — PROGRESS NOTES
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[facility])    Persistent atrial fibrillation (HCC)    Essential hypertension    Mixed hyperlipidemia    Stenosis of left carotid artery    Bilateral carotid artery stenosis    GI bleed    Acute CVA (cerebrovascular accident) ([facility]    Atrial fibrillation ([facility]       Surgical/Interventional Procedures:   9/21: TNK for L M2 occlusion  9/22 More confused overnight, repeat CT stable      VITAL SIGNS:   BP (!) 159/79   Pulse 85   Temp 97.5 °F (36.4 °C) (Oral)   Resp 17   Ht 5' 2.99\" (1.6 m)   Wt 149 lb 14.6 oz (68 kg)   SpO2 97%   BMI 26.56 kg/m²     PHYSICAL EXAM:    General appearance:  Comfortable. Pain Description: none  GCS:    4 - Opens eyes on own   6 - Follows simple motor commands  4 - Seems confused, disoriented    Pupil size:  Left 3 mm    Right 3 mm  Pupil reaction: Yes  Wiggles fingers: Left Yes Right Yes  Hand grasp:   Left normal    Right normal  Wiggles toes: Left Yes    Right Yes  Plantar flexion: Left normal   Right normal    CONSTITUTIONAL: no acute distress, lying in hospital bed. Alert and cooperative  CARDIOVASCULAR: S1 S2, irregular rate and rhythm, no murmur/gallop/rub. Monitor: A. fib  PULMONARY: no rhonchi/rales/wheezes, no use of accessory muscles, RA  RENAL: external kenny clear yellow urine  ABDOMEN: soft, nontender, nondistended, nontympanic, no masses, no organomegaly, normal bowel sounds   MUSCULOSKELETAL: moves all extremities purposefully, 5/5 strength   SKIN/EXTREMITIES: no rashes/ecchymosis, no edema/clubbing, warm/dry, good capillary refill     Assessment & Plan:       Neuro: Left MCA M2 occlusion status post TNK. Monitor neuro status, Neurology following, stroke protocol, Lipitor, aspirin  CV: No acute issues. Monitor hemodynamics. BP goal less than 180/105.  PRN hydralazine & labetalol. 2Decho. Home Toprol  Pulm: No acute issues monitor RR & SpO2. Pulmonary hygiene  GI: No acute issues. Speech-language and cognition diet. Monitor bowel function. Renal: Hypokalemia. BUN & Cr. Monitor electrolytes & replace as needed. Monitor urine output. ID: No acute issues  Endocrine: No acute issues. Monitor BS.    MSK: No acute issues. PT/OT. Heme: No acute issues     Bowel regime: Glycolax   Pain control/Sedation: Tylenol  DVT prophylaxis: SCDs.  Lovenox until 24 hours after TNK  GI prophylaxis: Diet  Mouth/Eye care: Home glaucoma drops  Code status: Full    Disposition: Transfer      Time:  25 minutes       Electronically signed by ANTHONY Kaplan CNP on 9/22/2022 at 1:13 PM

## 2022-09-23 ENCOUNTER — APPOINTMENT (OUTPATIENT)
Dept: MRI IMAGING | Age: 82
DRG: 062 | End: 2022-09-23
Payer: MEDICARE

## 2022-09-23 DIAGNOSIS — I48.91 ATRIAL FIBRILLATION, UNSPECIFIED TYPE (HCC): Primary | ICD-10-CM

## 2022-09-23 LAB
ANION GAP SERPL CALCULATED.3IONS-SCNC: 12 MMOL/L (ref 7–16)
BUN BLDV-MCNC: 16 MG/DL (ref 6–23)
CALCIUM SERPL-MCNC: 8.6 MG/DL (ref 8.6–10.2)
CHLORIDE BLD-SCNC: 107 MMOL/L (ref 98–107)
CO2: 20 MMOL/L (ref 22–29)
CREAT SERPL-MCNC: 0.9 MG/DL (ref 0.5–1)
GFR AFRICAN AMERICAN: >60
GFR NON-AFRICAN AMERICAN: 60 ML/MIN/1.73
GLUCOSE BLD-MCNC: 87 MG/DL (ref 74–99)
HCT VFR BLD CALC: 36.7 % (ref 34–48)
HEMOGLOBIN: 12.2 G/DL (ref 11.5–15.5)
MAGNESIUM: 2.1 MG/DL (ref 1.6–2.6)
MCH RBC QN AUTO: 28.3 PG (ref 26–35)
MCHC RBC AUTO-ENTMCNC: 33.2 % (ref 32–34.5)
MCV RBC AUTO: 85.2 FL (ref 80–99.9)
PDW BLD-RTO: 15.1 FL (ref 11.5–15)
PHOSPHORUS: 3.1 MG/DL (ref 2.5–4.5)
PLATELET # BLD: 159 E9/L (ref 130–450)
PMV BLD AUTO: 10.1 FL (ref 7–12)
POTASSIUM SERPL-SCNC: 3.9 MMOL/L (ref 3.5–5)
RBC # BLD: 4.31 E12/L (ref 3.5–5.5)
SODIUM BLD-SCNC: 139 MMOL/L (ref 132–146)
WBC # BLD: 5.3 E9/L (ref 4.5–11.5)

## 2022-09-23 PROCEDURE — 6360000002 HC RX W HCPCS: Performed by: PSYCHIATRY & NEUROLOGY

## 2022-09-23 PROCEDURE — 85027 COMPLETE CBC AUTOMATED: CPT

## 2022-09-23 PROCEDURE — 97535 SELF CARE MNGMENT TRAINING: CPT

## 2022-09-23 PROCEDURE — 6370000000 HC RX 637 (ALT 250 FOR IP): Performed by: NURSE PRACTITIONER

## 2022-09-23 PROCEDURE — 83735 ASSAY OF MAGNESIUM: CPT

## 2022-09-23 PROCEDURE — 6370000000 HC RX 637 (ALT 250 FOR IP): Performed by: PSYCHIATRY & NEUROLOGY

## 2022-09-23 PROCEDURE — 84100 ASSAY OF PHOSPHORUS: CPT

## 2022-09-23 PROCEDURE — 36415 COLL VENOUS BLD VENIPUNCTURE: CPT

## 2022-09-23 PROCEDURE — 97530 THERAPEUTIC ACTIVITIES: CPT

## 2022-09-23 PROCEDURE — 2580000003 HC RX 258: Performed by: EMERGENCY MEDICINE

## 2022-09-23 PROCEDURE — 2060000000 HC ICU INTERMEDIATE R&B

## 2022-09-23 PROCEDURE — 99232 SBSQ HOSP IP/OBS MODERATE 35: CPT

## 2022-09-23 PROCEDURE — 92507 TX SP LANG VOICE COMM INDIV: CPT | Performed by: SPEECH-LANGUAGE PATHOLOGIST

## 2022-09-23 PROCEDURE — 70551 MRI BRAIN STEM W/O DYE: CPT

## 2022-09-23 PROCEDURE — 80048 BASIC METABOLIC PNL TOTAL CA: CPT

## 2022-09-23 RX ADMIN — Medication 10 ML: at 22:33

## 2022-09-23 RX ADMIN — LATANOPROST 1 DROP: 50 SOLUTION OPHTHALMIC at 22:40

## 2022-09-23 RX ADMIN — METOPROLOL SUCCINATE 25 MG: 25 TABLET, EXTENDED RELEASE ORAL at 10:56

## 2022-09-23 RX ADMIN — ASPIRIN 81 MG 81 MG: 81 TABLET ORAL at 10:58

## 2022-09-23 RX ADMIN — TIMOLOL MALEATE 1 DROP: 5 SOLUTION OPHTHALMIC at 10:58

## 2022-09-23 RX ADMIN — TIMOLOL MALEATE 1 DROP: 5 SOLUTION OPHTHALMIC at 22:40

## 2022-09-23 RX ADMIN — DORZOLAMIDE HYDROCHLORIDE 1 DROP: 20 SOLUTION/ DROPS OPHTHALMIC at 10:59

## 2022-09-23 RX ADMIN — Medication 10 ML: at 10:51

## 2022-09-23 RX ADMIN — DORZOLAMIDE HYDROCHLORIDE 1 DROP: 20 SOLUTION/ DROPS OPHTHALMIC at 22:39

## 2022-09-23 RX ADMIN — ENOXAPARIN SODIUM 40 MG: 100 INJECTION SUBCUTANEOUS at 09:23

## 2022-09-23 RX ADMIN — ATORVASTATIN CALCIUM 40 MG: 40 TABLET, FILM COATED ORAL at 22:33

## 2022-09-23 ASSESSMENT — PAIN SCALES - GENERAL
PAINLEVEL_OUTOF10: 0

## 2022-09-23 NOTE — PLAN OF CARE
Problem: Discharge Planning  Goal: Discharge to home or other facility with appropriate resources  Outcome: Progressing  Flowsheets (Taken 9/23/2022 1900)  Discharge to home or other facility with appropriate resources: Identify barriers to discharge with patient and caregiver     Problem: Skin/Tissue Integrity  Goal: Absence of new skin breakdown  Description: 1. Monitor for areas of redness and/or skin breakdown  2. Assess vascular access sites hourly  3. Every 4-6 hours minimum:  Change oxygen saturation probe site  4. Every 4-6 hours:  If on nasal continuous positive airway pressure, respiratory therapy assess nares and determine need for appliance change or resting period. Outcome: Progressing     Problem: Safety - Adult  Goal: Free from fall injury  Outcome: Progressing  Flowsheets (Taken 9/23/2022 1900)  Free From Fall Injury: Based on caregiver fall risk screen, instruct family/caregiver to ask for assistance with transferring infant if caregiver noted to have fall risk factors     Problem: ABCDS Injury Assessment  Goal: Absence of physical injury  Outcome: Progressing  Flowsheets (Taken 9/23/2022 1900)  Absence of Physical Injury: Implement safety measures based on patient assessment     Problem: Neurosensory - Adult  Goal: Achieves stable or improved neurological status  Outcome: Progressing  Flowsheets (Taken 9/23/2022 1900)  Achieves stable or improved neurological status:   Assess for and report changes in neurological status   Maintain blood pressure and fluid volume within ordered parameters to optimize cerebral perfusion and minimize risk of hemorrhage   Monitor temperature, glucose, and sodium.  Initiate appropriate interventions as ordered  Goal: Absence of seizures  Outcome: Progressing  Goal: Achieves maximal functionality and self care  Outcome: Progressing  Flowsheets (Taken 9/23/2022 1900)  Achieves maximal functionality and self care:   Monitor swallowing and airway patency with patient fatigue and changes in neurological status   Encourage and assist patient to increase activity and self care with guidance from physical therapy/occupational therapy     Problem: Skin/Tissue Integrity - Adult  Goal: Skin integrity remains intact  Outcome: Progressing  Flowsheets (Taken 9/23/2022 1900)  Skin Integrity Remains Intact: Monitor for areas of redness and/or skin breakdown  Goal: Oral mucous membranes remain intact  Outcome: Progressing  Flowsheets (Taken 9/23/2022 1900)  Oral Mucous Membranes Remain Intact:   Assess oral mucosa and hygiene practices   Implement preventative oral hygiene regimen     Problem: Infection - Adult  Goal: Absence of infection at discharge  Outcome: Progressing  Flowsheets (Taken 9/23/2022 1900)  Absence of infection at discharge:   Assess and monitor for signs and symptoms of infection   Monitor lab/diagnostic results  Goal: Absence of infection during hospitalization  Outcome: Progressing  Flowsheets (Taken 9/23/2022 1900)  Absence of infection during hospitalization:   Assess and monitor for signs and symptoms of infection   Monitor lab/diagnostic results   Administer medications as ordered     Problem: Pain  Goal: Verbalizes/displays adequate comfort level or baseline comfort level  Outcome: Progressing  Flowsheets (Taken 9/23/2022 1900)  Verbalizes/displays adequate comfort level or baseline comfort level:   Encourage patient to monitor pain and request assistance   Assess pain using appropriate pain scale   Administer analgesics based on type and severity of pain and evaluate response     Problem: Confusion  Goal: Confusion, delirium, dementia, or psychosis is improved or at baseline  Description: INTERVENTIONS:  1. Assess for possible contributors to thought disturbance, including medications, impaired vision or hearing, underlying metabolic abnormalities, dehydration, psychiatric diagnoses, and notify attending LIP  2.  [place] high risk fall precautions, as indicated  3. Provide frequent short contacts to provide reality reorientation, refocusing and direction  4. Decrease environmental stimuli, including noise as appropriate  5. Monitor and intervene to maintain adequate nutrition, hydration, elimination, sleep and activity  6. If unable to ensure safety without constant attention obtain sitter and review sitter guidelines with assigned personnel  7.  Initiate Psychosocial CNS and Spiritual Care consult, as indicated  Outcome: Progressing  Flowsheets (Taken 9/23/2022 1900)  Effect of thought disturbance (confusion, delirium, dementia, or psychosis) are managed with adequate functional status:   [place] high risk fall precautions, as indicated   Provide frequent short contacts to provide reality reorientation, refocusing and direction   Decrease environmental stimuli, including noise as appropriate   Monitor and intervene to maintain adequate nutrition, hydration, elimination, sleep and activity

## 2022-09-23 NOTE — PROGRESS NOTES
[facility]                                Progress note    Subjective:    Awaiting MRI   Feels great   Ambulated and worked with pt     Objective:    BP (!) 145/88   Pulse 83   Temp 98.1 °F (36.7 °C) (Oral)   Resp 30   Ht 5' 2.99\" (1.6 m)   Wt 138 lb 8 oz (62.8 kg)   SpO2 96%   BMI 24.54 kg/m²     In: 540 [P.O.:540]  Out: 1200   In: 540   Out: 1200 [Urine:1200]    General appearance: NAD, conversant  HEENT: AT/NC, MMM  Neck: FROM, supple  Lungs: Clear to auscultation  CV: irregular, no MRGs  Vasc: Radial pulses 2+  Abdomen: Soft, non-tender; no masses or HSM  Extremities: No peripheral edema or digital cyanosis  Skin: no rash, lesions or ulcers  Psych: Alert and oriented to person, place and time  Neuro: Alert and interactive-no focal deficits     Recent Labs     09/21/22  0802 09/22/22  0557 09/23/22  0434   WBC 7.2 5.5 5.3   HGB 13.1 13.1 12.2   HCT 38.9 37.8 36.7    145 159         Recent Labs     09/21/22  0802 09/22/22  0557 09/23/22  0434    140 139   K 3.4* 3.4* 3.9    108* 107   CO2 18* 19* 20*   BUN 13 12 16   CREATININE 0.7 0.7 0.9   CALCIUM 8.8 8.9 8.6         Assessment:    Principal Problem:    Cerebrovascular accident (CVA) ([facility]  Active Problems:    Atrial fibrillation ([facility]    Essential hypertension    Mixed hyperlipidemia  Resolved Problems:    * No resolved hospital problems.  *      Plan:  Patient is a 64-year-old female admitted to ICU for  Acute CVA left MCA territory with thrombus, not a candidate for intervention  -TNKase administered 9/20/2022 around 6 PM  -Monitor labs  -Check A1c and lipid panel  -follow-up CT head 24 hours following TNKase administration > negative   -MRI brain still pending- this is the hold up for dc   -Continue statin  -Ok to start ASA today and DVT proph   -Neurology consult for ongoing management poststroke  -Echo with bubble > negative for PFO, EF 60-65%     Atrial fibrillation not on [facility] at home  -Continue metoprolol  -unable to tolerate oac   -referral for outpatient watchman device at [facility] [place] per neurology      Hypertension  -Continue home medications, metoprolol 25 mg daily   -Monitor vital signs, BP's improved today     Mild Hypokalemia  -Monitor labs  -K+ 3.4 > 3.9  -Potassium protocol      DVT Prophylaxis Lovenox  PT/OT  Discharge planning as soon as mri is done         Harinder Ospina MD

## 2022-09-23 NOTE — PROGRESS NOTES
Jennifer Alexis is a 78 y.o. right handed female     PMH: Arthritis, A. fib, hyperlipidemia, hypertension, carotid stenosis, previous stroke, and glaucoma    She presented to the ER 9/20 for sudden onset dysarthria, aphasia, facial droop, and altered mental status which was witnessed by family. Her NIH was a 2, and she was not a candidate for endovascular treatment due to her low NIH scale score. She did need TNK and  was transferred to the ICU. CT of her head demonstrated evidence of intravascular thrombus in her CTA/CTP imaging displayed large penumbra and no core. She has a history of A. fib and was not on any anticoagulation because experienced recurrent bleeding issues (GI bleed, epistaxis). She is an excellent candidate for the watchman and is agreeable to the consult. Is a consult to [facility] for the [facility] device. Her son also suffers from A. fib and has been schooled in the [facility] device already. She is awake alert and oriented x3 sitting up in bed with family by the bedside. She has no deficits at this time. She jokes around and is anxious to go home.      Allergies as of 09/20/2022 - Fully Reviewed 09/20/2022   Allergen Reaction Noted    Erythromycin Nausea And Vomiting 10/16/2014       Objective:     BP (!) 145/88   Pulse 83   Temp 98.1 °F (36.7 °C) (Oral)   Resp 30   Ht 5' 2.99\" (1.6 m)   Wt 138 lb 8 oz (62.8 kg)   SpO2 96%   BMI 24.54 kg/m²      General appearance: alert, appears stated age and cooperative  Head: Normocephalic, without obvious abnormality, atraumatic  Extremities: no cyanosis or edema  Pulses: 2+ and symmetric  Skin: no rashes or lesions    Mental Status: Alert, oriented, thought content appropriate    Speech: clear  Language: appropriate    Cranial Nerves:  I: smell    II: visual acuity     II: visual fields Full   II: pupils EDITH   III,VII: ptosis None   III,IV,VI: extraocular muscles  EOMI without nystagmus    V: mastication Normal   V: facial light touch sensation  Normal   V,VII: corneal reflex  Present   VII: facial muscle function - upper     VII: facial muscle function - lower Normal   VIII: hearing Normal   IX: soft palate elevation  Normal   IX,X: gag reflex    XI: trapezius strength  5/5   XI: sternocleidomastoid strength 5/5   XI: neck extension strength  5/5   XII: tongue strength  Normal     Motor:  5/5 throughout  Normal bulk and tone    Sensory:  Normal to LT and PP  Vibration normal in the ankles     Coordination:   FN, FFM and CARLOS symmetrical    Gait:  Deferred for safety    DTR:   2+ throughout    No Babinski    No Dugan's     Laboratory/Radiology:     CBC with Differential:    Lab Results   Component Value Date/Time    WBC 5.3 09/23/2022 04:34 AM    RBC 4.31 09/23/2022 04:34 AM    HGB 12.2 09/23/2022 04:34 AM    HCT 36.7 09/23/2022 04:34 AM     09/23/2022 04:34 AM    MCV 85.2 09/23/2022 04:34 AM    MCH 28.3 09/23/2022 04:34 AM    MCHC 33.2 09/23/2022 04:34 AM    RDW 15.1 09/23/2022 04:34 AM    LYMPHOPCT 21.7 09/20/2022 05:33 PM    MONOPCT 9.2 09/20/2022 05:33 PM    BASOPCT 0.8 09/20/2022 05:33 PM    MONOSABS 0.49 09/20/2022 05:33 PM    LYMPHSABS 1.15 09/20/2022 05:33 PM    EOSABS 0.15 09/20/2022 05:33 PM    BASOSABS 0.04 09/20/2022 05:33 PM     BMP:    Lab Results   Component Value Date/Time     09/23/2022 04:34 AM    K 3.9 09/23/2022 04:34 AM    K 3.7 09/20/2022 05:33 PM     09/23/2022 04:34 AM    CO2 20 09/23/2022 04:34 AM    BUN 16 09/23/2022 04:34 AM    LABALBU 4.5 09/20/2022 05:33 PM    CREATININE 0.9 09/23/2022 04:34 AM    CALCIUM 8.6 09/23/2022 04:34 AM    GFRAA >60 09/23/2022 04:34 AM    LABGLOM 60 09/23/2022 04:34 AM    GLUCOSE 87 09/23/2022 04:34 AM     HgBA1c:    Lab Results   Component Value Date/Time    LABA1C 5.8 09/21/2022 08:02 AM     FLP:    Lab Results   Component Value Date/Time    TRIG 79 09/21/2022 08:02 AM    HDL 49 09/21/2022 08:02 AM    LDLCALC 68 09/21/2022 08:02 AM    LABVLDL 16 09/21/2022 08:02 AM     CT head/CTA neck/CTA head/CT perfusion  Head CT: No acute intracranial abnormality. No acute territorial infarction,  intracranial hemorrhage or mass lesion. Moderate generalized volume loss. Mild chronic microangiopathic ischemic  disease. Cta neck: Mild 20% stenosis at the origin right cervical ICA. Cta head: sudden cut off of the superior cortical branch of left MCA within  left sylvian fissure. Moderate stenosis of right PCA P2 segment. Multifocal areas of mild stenosis  of left PCA P1 segment. Ct perfusion: No infarct core. 27 cc focus of hypoperfusion within left  temporoparietal area. I personally reviewed the patient's lab and imaging studies at this time.     Assessment:     Left MCA stroke  - Sudden onset aphasia and right-sided weakness  - Has a history of A. fib and her oral anticoagulation was on hold due to bleeding issues such as GI bleed and Epistaxis.   -She was administered TN K and was transferred to the ICU  -Right sided weakness has resolved along with her aphasia  -Referral was made to [facility] for the watchman device    Plan:     MRI brain pending  Outpatient referral to cardiology at [facility] for watchman  LDL goal less than 70  A1c goal less than 7  Stroke education  PT/OT/SLP  Continue aspirin  Continue statin          ANTHONY Jennings - CNP  2:06 PM  9/23/2022

## 2022-09-23 NOTE — CARE COORDINATION
9/23/2022 - Updated CM note - Admitted for acute CVA, admitted for acute CVA, left MCA M2 occlusion. S/P t-PA administration on 9/20. Neuro stroke consulted in ED. Neurology following. MRI brain needs done. PT 17/24, OT 17/24 yesterday. Discharge plan is home with [facility] for speech therapy. Home health care orders in Epic chart. Pt has a supportive family and her daughter lives with pt. They will provide transportation home. SW/CM will follow. 

## 2022-09-23 NOTE — PROGRESS NOTES
Physical Therapy  Physical Therapy Treatment Note    Name: Yessy Henley  : 1940  MRN: [id number]      Date of Service: 2022    Evaluating PT:  Dung Cooley, PT, DPJACKY [id number]    Room #:  3192/9812-L  Diagnosis:  Acute CVA (cerebrovascular accident) [facility]) [I63.9]  Cerebrovascular accident (CVA), unspecified mechanism ([facility] [I63.9]  PMHx/PSHx:    Past Medical History:   Diagnosis Date    Arthritis     Atrial fibrillation ([facility]     Carotid stenosis, right     Cerebral artery occlusion with cerebral infarction ([facility]     MEMORY ISSUES, GAIT ISSUES    Glaucoma     History of blood transfusion     rupture appendicitis    Hx of blood clots     developed in leg after appendectomy age 13 yrs     Hyperlipidemia     Hypertension     IBS (irritable bowel syndrome)     Nausea     Nosebleed 2019    in eor dr Andrae Finne[place] left nostril packing    UTI (lower urinary tract infection)      Procedure/Surgery:   TNK  Precautions:  Falls, STM deficits, mild expressive aphasia, bed/chair alarm  Equipment Needs:  TBD    SUBJECTIVE:    Pt lives with daughter in a 1 story home with 3 stairs to enter and 1 rail. Pt ambulated without device and was independent PTA. OBJECTIVE:   Initial Evaluation  Date: 22 Treatment  22 Short Term/ Long Term   Goals   AM-PAC 6 Clicks 62/04 99/69    Was pt agreeable to Eval/treatment? Yes Yes    Does pt have pain?  No c/o pain No c/o pain    Bed Mobility  Rolling: NT  Supine to sit: Rossana  Sit to supine: NT  Scooting: Rossana Rolling: NT  Supine to sit: SBA  Sit to supine: NT  Scooting: SBA Mod Independent   Transfers Sit to stand: Rossana  Stand to sit: Rossana  Stand pivot: Rossana with [address] Sit to stand: Rossana  Stand to sit: Rossana  Stand pivot: Rossana with no AD Mod Independent with [address]   Ambulation   70 feet with Rossana with [address] 200 feet with FWW SBA >400 feet with Mod Independent with [address]    Stair negotiation: ascended and descended NT NT >4 steps with 1 rail Mod Independent   ROM BUE:  Defer to OT note  BLE:  WFL     Strength BUE:  Defer to OT note  BLE:  4/5  Increase by 1/3 MMT grade   Balance Sitting EOB:  Rossana  Dynamic Standing:  Rossana with [employer] Sitting EOB:  Rossana  Dynamic Standing:  SBA with [employer] Sitting EOB:  Independent  Dynamic Standing: Mod Independent with [employer]     Pt is A & O x 2  CAM-ICU: NT  RASS: 0  Sensation:  No reported paresthesias  Edema:  None    Vitals:  Heart Rate at rest 79 bpm Heart Rate post session 89 bpm   SpO2 at rest -% SpO2 post session 98%   Blood Pressure at rest 145/88 mmHg Blood Pressure post session 159/107 mmHg       Functional Status Score-Intensive Care Unit (FSS-ICU)   Rolling -/7   Supine to sit transfer 5/7   Unsupported sitting  5/7   Sit to stand transfers 4/7   Ambulation 5/7   Total  19/35       Therapeutic Exercises:  NA    Patient education  Pt educated on safety, FWW usage     Patient response to education:   Pt verbalized understanding Pt demonstrated skill Pt requires further education in this area   x x x     ASSESSMENT:    Conditions Requiring Skilled Therapeutic Intervention:    [x]Decreased strength     []Decreased ROM  [x]Decreased functional mobility  [x]Decreased balance   [x]Decreased endurance   [x]Decreased posture  []Decreased sensation  []Decreased coordination   []Decreased vision  []Decreased safety awareness   []Increased pain       Comments:  NP reported pt was medically stable. Pt was in bed upon arrival, agreeable to treatment session. Pt was pleasant and agreeable. Attempted transfer to chair using no device and pt was a bit unsteady. Pt was given FWW and balance much improved. Pt had improved gait stability and distance this date. Pt's son present during session. Pt sitting in chair with chair alarm on exit.       Treatment:  Patient practiced and was instructed in the following treatment:    Bed mobility training - pt given verbal and tactile cues to facilitate proper sequencing and safety during  supine>sit as well as provided with physical assistance  Transfer training - pt was given verbal and tactile cues to facilitate proper hand placement, technique and safety during sit to stand, stand to sit and stand pivot transfers as well as provided with physical assistance. Gait training- pt was given verbal and tactile cues to facilitate safety, balance, posture and use of FWW during ambulation as well as provided with physical assistance. PLAN:    Patient is making good progress towards established goals. Will continue with current POC.       Time in  1320  Time out  1345    Total Treatment Time  25 minutes     CPT codes:  [] Gait training 34078 - minutes  [] Manual therapy 11551 - minutes  [x] Therapeutic activities 20848 25 minutes  [] Therapeutic exercises 36745 - minutes  [] Neuromuscular reeducation 67120 - minutes    Manju Kwan PT, DPT  [id number]

## 2022-09-23 NOTE — PROGRESS NOTES
[address] [address] [facility]  [address] [place] [address]       Date:2022                                                               Patient Name: Se Martinez  MRN: [id number]  : 1940  Room: [address] [place] [address]    Evaluating OT: Edyta Pace OTR/L [id number]    Referring Provider:ANTHONY Lowry CNP    Specific Provider Orders/Date: OT eval and treat (22)       Diagnosis: Acute CVA      Reason for admission: sudden onset of dysarthria, aphasia, facial droop and altered mental status; NIH 2    Surgery/Procedures:  TNK     Pertinent Medical History:  arthritis, Afib, carotid stenosis, glaucoma, blood clots, HTN, HLD, IBS, UTI     *Precautions:  Fall Risk, STM deficits, mild expressive aphasia, glaucoma     Assessment of current deficits   [x] Functional mobility  [x]ADLs  [x] Strength               [x]Cognition   [x] Functional transfers   [x] IADLs         [x] Safety Awareness   [x]Endurance   [] Fine Coordination        [] ROM     [] Vision/perception   []Sensation    []Gross Motor Coordination [x] Balance   [] Delirium                  [x]Motor Control     [x] Communication    OT PLAN OF CARE   OT POC based on physician orders, patient diagnosis and results of clinical assessment.        Frequency/Duration: 1-3 days/wk for 1-2 weeks PRN    Specific OT Treatment to include:   ADL retraining/adapted techniques and AE recommendations to increase functional independence within precautions                    Energy conservation techniques to improve tolerance for selfcare routine   Functional transfer/mobility training/DME recommendations for increased independence, safety and fall prevention         Patient/family education to increase safety and functional independence within precautions              Environmental modifications for safe mobility and completion of ADLs                           Cognitive retraining ex's to improve problem solving skills & safe participation in ADLs/IADLs  Sensory re-education techniques to improve extremity awareness, maintain skin integrity and improve hand function                             Visual/Perceptual retraining  to improve body awareness and safety during transfers and ADLs  Therapeutic activity to improve functional performance during ADLs/IADLs                                         Therapeutic exercise to improve tolerance and functional strength for ADLs   Balance retraining exercises/tasks for facilitation of postural control with dynamic challenges during ADLs . Positioning to improve functional independence  Neuromuscular re-education: facilitation of righting/equilibrium reactions, normalization muscle tone/facilitation active functional movement                      Delirium prevention/treatment    Modified [place] Scale   Score     Description  0             No symptoms  1             No significant disability despite symptoms  2             Slight disability; able to look after own affairs  3             Moderate disability; able to ambulate without assist/ requires assist with ADLs  4             Moderate/Severe disability;requires assist to ambulate/assist with ADLs  5             Severe disability;bedridden/incontinent   6               Score:   4    Recommended Adaptive Equipment: TBA: WW     Home Living: Pt lives with daughter  in a 1 floor plan with 3 step(s) to enter and 1 rail(s); bed/bath on first floor  Bathroom setup: tub and walk in shower  Equipment owned: tub rail, shower seat, SPC    Prior Level of Function: IND with ADLs;  IND with IADLs. SPC PRN for ambulation. Driving: no  Occupation: retired      Pain Level: pt c/o 0/10  pain  this session    Cognition: A&O: 2/4; pt oriented to self/place. Pt reports month as \"December. \" Unable to recall during session. Follows 1-2 step commands with min redirection.     Memory: P+- STM; 1/3 word recall with cues (performed 3 times during session after reorientation to words and forming association to words for recall. Pt with same results each attempt) *Per son present, pt had a h/o CVA in the past with memory deficits that improved. Pt's son reporting pt seems \"better\" today. Pt often uses humor to mask cognitive deficits. Comprehension fair   Problem solving: fair-;  intermittent verbal cues for simple problems during ADLs. Judgement/safety: fair               Communication skills: WFL demonstrates improved word finding this date. Vision: [facility]; denies vision changes (h/o glaucoma)               Glasses:reading                                                   Hearing: [facility]     RASS: 0  CAM-ICU: (NT) Delirium    UE Assessment:  Hand Dominance: Right [x]  Left []     ROM Strength STM goal: PRN   RUE  WFL   4-/5 4/5     LUE WFL 4-/5 4/5       Sensation: No c/o numbness/tingling in extremities. Tone: WNL   Edema: [facility]     Functional Assessment:  AM-PAC Daily Activity Raw Score: 17/24   Initial Eval Status  Date: 9/21/22 Treatment Status  Date: 9/23/22 STGs = LTGs  Time frame: 7-14 days   Feeding S; set up S; Set-up                         Bibiana  while seated up in chair to increase activity tolerance        Grooming Min A 9/22-SBA  While standing at sink to brush teeth. Pt requiring min verbal cues for attention/initiation. Pt intermittently confused during ADL. Bibiana   while standing sink level requiring AD for balance and demonstrating G tolerance; G safety     UB dressing/bathing Min A Min A                      Bibiana       LB dressing/bathing Min A Mod A overall  To scottie depends brief and hospital while seated in supportive chair for reach. Pt requiring assist for balance. *Pt educated on use of reacher to assist with safe reach. Pt demo poor ability to manipulate tool and problems solve through task.      Pt encouraged to use figure four technique as able for reach. Bibiana  using AE as needed for safe reach/ energy conservation       Toileting NT NT  (Provided with brief for functional mobility after pure wick removed)    Pt declines need to use commode during session. Bibiana     Bed Mobility  Supine to sit:   Min A    Sit to supine:   NT Supine to sit:   SBA    Sit to supine:   NT                       Bibiana  in prep of ADL tasks & transfers   Functional Transfers Sit to stand:   Min A     Stand to sit:   Min A Sit to stand:   Min A     Stand to sit:   Min A                       Bibiana  sit<>stand/functional bathroom transfers using AD/DME as needed for balance and safety   Functional Mobility Min [facility] Min A with FWW  For long household distance. Bibiana   functional/bathroom mobility using AD as needed & demonstrating G safety     Balance Sitting:     Static:  SBA    Dynamic:Min A  Standing: [facility] Sitting:     Static:  SBA    Dynamic: Min A  Standing: [facility] Bibiana dynamic sitting balance; Bibiana dynamic standing balance  during ADL tasks & transfers   Endurance/  Activity Tolerance   F tolerance with light activity. F tolerance with moderate activity. G   tolerance with moderate activity/self care routine   Visual/  Perceptual   WFL                     Vitals:  Heart Rate at rest 79 bpm Heart Rate post session 89 bpm   SpO2 at rest -% SpO2 post session 98%   Blood Pressure at rest 145/88 mmHg Blood Pressure post session 159/107 mmHg          Treatment: OT treatment provided this date includes:   Instruction/training on safety and adapted techniques for completion of ADLs: OT facilitated completion of LBD task and educated patient on use of adaptive techniques to increase independence and safety in self-care. Pt educated on use of reacher demonstrating poor results. Impaired learning.     Instruction/training on safe bed mobility/functional mobility/transfer techniques: with focus on safety, body mechanics, and activity tolerance. Skilled Monitoring of Vitals: to include BP, spO2, and HR throughout session to maximize safety. Sitting/Standing Balance/Tolerance: Pt stood during ADLS (pulling pants/brief up over hips) using [facility] for support. Delirium Prevention: Environmental and sensory modifications assessed and implemented to decrease ICU acquired delirium and to improve overall orientation, mentation and pt interaction with family/staff. Line management and environmental modifications made prior to and end of session to ensure patient safety and to increase efficiency of session. Skilled monitoring of HR, O2 saturation, blood pressure and patient's response to activity performed throughout session. Comments: OK from RN to see patient. Upon arrival, patient semi supine in bed, agreeable to session. Son present. Pt demo fair tolerance with fair understanding of education/techniques. Pt continues to present with  impaired STM skills. Son educated on use of memory journal to assist with IADLs. At end of session, patient left seated in chair to increase activity tolerance. Chair alarm activated. Nurse aware. Call light within reach, all lines and tubes intact. Pt instructed on use of call light for assistance and fall prevention .     Time In: 1315            Time Out: 1445         Total Treatment time: 30   Min Units   OT Eval Low 35426     OT Eval Medium 66270     OT Eval High [id number]     OT Re-Eval [id number]     Therapeutic Ex [id number]     Therapeutic Activities 91132 00 5   ADL/Self Care 87596 30 1   Orthotic Management 83862     Neuro Re-Ed 84682     Non-Billable Time        Melani Maria, OTR/L [id number]

## 2022-09-24 VITALS
DIASTOLIC BLOOD PRESSURE: 77 MMHG | BODY MASS INDEX: 24.54 KG/M2 | HEART RATE: 75 BPM | WEIGHT: 138.5 LBS | TEMPERATURE: 98.1 F | OXYGEN SATURATION: 97 % | SYSTOLIC BLOOD PRESSURE: 171 MMHG | RESPIRATION RATE: 11 BRPM | HEIGHT: 63 IN

## 2022-09-24 PROCEDURE — 2580000003 HC RX 258: Performed by: EMERGENCY MEDICINE

## 2022-09-24 PROCEDURE — 6370000000 HC RX 637 (ALT 250 FOR IP): Performed by: PSYCHIATRY & NEUROLOGY

## 2022-09-24 PROCEDURE — 6360000002 HC RX W HCPCS: Performed by: PSYCHIATRY & NEUROLOGY

## 2022-09-24 PROCEDURE — 6370000000 HC RX 637 (ALT 250 FOR IP): Performed by: NURSE PRACTITIONER

## 2022-09-24 PROCEDURE — 99233 SBSQ HOSP IP/OBS HIGH 50: CPT | Performed by: NURSE PRACTITIONER

## 2022-09-24 RX ORDER — ASPIRIN 81 MG/1
81 TABLET, CHEWABLE ORAL DAILY
Qty: 30 TABLET | Refills: 3 | Status: SHIPPED | OUTPATIENT
Start: 2022-09-25 | End: 2022-09-24 | Stop reason: HOSPADM

## 2022-09-24 RX ADMIN — Medication 10 ML: at 09:44

## 2022-09-24 RX ADMIN — DORZOLAMIDE HYDROCHLORIDE 1 DROP: 20 SOLUTION/ DROPS OPHTHALMIC at 09:34

## 2022-09-24 RX ADMIN — METOPROLOL SUCCINATE 25 MG: 25 TABLET, EXTENDED RELEASE ORAL at 09:34

## 2022-09-24 RX ADMIN — TIMOLOL MALEATE 1 DROP: 5 SOLUTION OPHTHALMIC at 09:35

## 2022-09-24 RX ADMIN — ASPIRIN 81 MG 81 MG: 81 TABLET ORAL at 09:34

## 2022-09-24 RX ADMIN — ENOXAPARIN SODIUM 40 MG: 100 INJECTION SUBCUTANEOUS at 09:33

## 2022-09-24 ASSESSMENT — PAIN SCALES - GENERAL
PAINLEVEL_OUTOF10: 0

## 2022-09-24 NOTE — PROGRESS NOTES
Neuro Inpatient Follow Up Note      Carole Boothe is a 78 y.o. right handed female     Neurology is following for stroke    PMH: Arthritis, A. fib, hyperlipidemia, hypertension, carotid stenosis s/p repair, previous stroke, and glaucoma    Synopsis:  She presented to the ER 9/20 for sudden onset dysarthria, aphasia, facial droop, and altered mental status which was witnessed by family. Her NIH was a 2, and imaging showed hyperdense and 5 branch on the left and left MCA M2 occlusion. She was not a candidate for endovascular treatment due to her low NIH scale score, but she received thrombolytic. History of A. fib and not on anticoagulation due to recurrent rectal bleeding and epistaxis. HPI:  She remains in ICU and will be discharged home with therapy. She has some residual aphasic errors and mild R sided weakness but feels improvement overall. MRI brain showed residual areas of stroke in her left MCA territory--no ICH. At this time she is on aspirin and statin therapy alone Her daughter and daughter in law are at the bedside.     No chest pain or palpitations  No SOB  No vertigo, lightheadedness or loss of consciousness  No falls, tripping or stumbling  No incontinence of bowels or bladder  No itching or bruising appreciated  No swallowing troubles    ROS otherwise negative      Current Facility-Administered Medications   Medication Dose Route Frequency Provider Last Rate Last Admin    aspirin chewable tablet 81 mg  81 mg Oral Daily Mo Whitman MD   81 mg at 09/24/22 0934    enoxaparin (LOVENOX) injection 40 mg  40 mg SubCUTAneous Daily Mo Whitman MD   40 mg at 09/24/22 0933    hydrALAZINE (APRESOLINE) injection 10 mg  10 mg IntraVENous Q10 Min PRN Vanice Poplar, APRN - CNP        labetalol (NORMODYNE;TRANDATE) injection 10 mg  10 mg IntraVENous Q10 Min PRN Vanice Poplar, APRN - CNP        atorvastatin (LIPITOR) tablet 40 mg  40 mg Oral Nightly Vanice Poplar, APRN - CNP   40 mg at 09/23/22 2233    metoprolol succinate (TOPROL XL) extended release tablet 25 mg  25 mg Oral Daily Kelsey Sable, APRN - CNP   25 mg at 09/24/22 0934    latanoprost (XALATAN) 0.005 % ophthalmic solution 1 drop  1 drop Both Eyes Nightly Kelsey Sable, APRN - CNP   1 drop at 09/23/22 2240    dorzolamide (TRUSOPT) 2 % ophthalmic solution 1 drop  1 drop Both Eyes BID Kelsey Sable, APRN - CNP   1 drop at 09/24/22 3527    And    timolol (TIMOPTIC) 0.5 % ophthalmic solution 1 drop  1 drop Both Eyes BID Kelsey Sable, APRN - CNP   1 drop at 09/24/22 0935    perflutren lipid microspheres (DEFINITY) injection 1.65 mg  1.5 mL IntraVENous ONCE PRN Kelsey Sable, APRN - CNP        acetaminophen (TYLENOL) tablet 650 mg  650 mg Oral Q4H PRN Clayburn Goods, APRN - CNP   650 mg at 09/21/22 1644    sodium chloride flush 0.9 % injection 5-40 mL  5-40 mL IntraVENous 2 times per day Kimberlee Ge, DO   10 mL at 09/24/22 0944    sodium chloride flush 0.9 % injection 5-40 mL  5-40 mL IntraVENous PRN Patience Ho DO        0.9 % sodium chloride infusion   IntraVENous PRN Kimberlee Ge DO          Objective:     BP (!) 133/99   Pulse 88   Temp 97.9 °F (36.6 °C) (Oral)   Resp 16   Ht 5' 2.99\" (1.6 m)   Wt 138 lb 8 oz (62.8 kg)   SpO2 98%   BMI 24.54 kg/m²      General appearance: alert, appears stated age and cooperative  Head: Normocephalic, without obvious abnormality, atraumatic  Eyes: sclera clear  Heart: AF--controlled rate  Lungs: nonlabored on room air  Extremities: no cyanosis or edema  Pulses: 2+ and symmetric  Skin: no rashes or lesions    Mental Status: Alert, oriented to person, place.  States it is 2021 and November than corrects to Sept. Very Pleasant    Good attention and concentration    Speech: clear  Language: mild anomic and paraphasic errors    Cranial Nerves:  I: smell    II: visual acuity     II: visual fields Full   II: pupils EDITH   III,VII: ptosis None   III,IV,VI: extraocular muscles  EOMI without nystagmus    V: mastication Normal   V: facial light touch sensation  Normal   V,VII: corneal reflex     VII: facial muscle function - upper     VII: facial muscle function - lower Normal   VIII: hearing Normal   IX: soft palate elevation  Normal   IX,X: gag reflex    XI: trapezius strength  5/5   XI: sternocleidomastoid strength 5/5   XI: neck extension strength  5/5   XII: tongue strength  Normal     Motor:  Mild R hemiparesis--worse in arm  Normal bulk and tone  No abnormal movments    Sensory:  Normal to LT     Coordination:   FN, FFM slower on R relative to weakness      DTR:   2+ throughout    R Babinski    No Dugan's     Laboratory/Radiology:     CBC with Differential:    Lab Results   Component Value Date/Time    WBC 5.3 09/23/2022 04:34 AM    RBC 4.31 09/23/2022 04:34 AM    HGB 12.2 09/23/2022 04:34 AM    HCT 36.7 09/23/2022 04:34 AM     09/23/2022 04:34 AM    MCV 85.2 09/23/2022 04:34 AM    MCH 28.3 09/23/2022 04:34 AM    MCHC 33.2 09/23/2022 04:34 AM    RDW 15.1 09/23/2022 04:34 AM    LYMPHOPCT 21.7 09/20/2022 05:33 PM    MONOPCT 9.2 09/20/2022 05:33 PM    BASOPCT 0.8 09/20/2022 05:33 PM    MONOSABS 0.49 09/20/2022 05:33 PM    LYMPHSABS 1.15 09/20/2022 05:33 PM    EOSABS 0.15 09/20/2022 05:33 PM    BASOSABS 0.04 09/20/2022 05:33 PM     BMP:    Lab Results   Component Value Date/Time     09/23/2022 04:34 AM    K 3.9 09/23/2022 04:34 AM    K 3.7 09/20/2022 05:33 PM     09/23/2022 04:34 AM    CO2 20 09/23/2022 04:34 AM    BUN 16 09/23/2022 04:34 AM    LABALBU 4.5 09/20/2022 05:33 PM    CREATININE 0.9 09/23/2022 04:34 AM    CALCIUM 8.6 09/23/2022 04:34 AM    GFRAA >60 09/23/2022 04:34 AM    LABGLOM 60 09/23/2022 04:34 AM    GLUCOSE 87 09/23/2022 04:34 AM     HgBA1c:    Lab Results   Component Value Date/Time    LABA1C 5.8 09/21/2022 08:02 AM     FLP:    Lab Results   Component Value Date/Time    TRIG 79 09/21/2022 08:02 AM    HDL 49 09/21/2022 08:02 AM    LDLCALC 68 09/21/2022 08:02 AM    LABVLDL 16 09/21/2022 08:02 AM     Head CT: No acute intracranial abnormality. No acute territorial infarction,  intracranial hemorrhage or mass lesion. Moderate generalized volume loss. Mild chronic microangiopathic ischemic  disease. Cta head and neck: Mild 20% stenosis at the origin right cervical ICA. sudden cut off of the superior cortical branch of left MCA within left sylvian fissure. Moderate stenosis of right PCA P2 segment. Multifocal areas of mild stenosisof left PCA P1 segment. Ct perfusion: No infarct core. 27 cc focus of hypoperfusion within left  temporoparietal area. MRI brain: Acute/subacute patchy cortical infarct present at the posterior aspect of the left insular cortex. Tiny old left thalamic lacunar infarct. Moderate diffuse cerebral atrophy and mild chronic white matter ischemic change. Echo with bubble: Normal left ventricle size and systolic function. Ejection fraction is visually estimated at 60-65%. No regional wall motion abnormalities seen. Normal left ventricle wall thickness. Abnormal LV diastolic function with an indeterminate grade (E/e'> 11). No evidence of patent foramen ovale on bubble study. Normal right ventricular size and function. No hemodynamically significant aortic stenosis is present. Mild aortic regurgitation is noted. RVSP is 34 mmHg. Normal estimated PA systolic pressure. No evidence for hemodynamically significant pericardial effusion    I personally reviewed the patient's lab and imaging studies at this time. Assessment:     Left MCA ischemic stroke: Secondary to cardioembolism from atrial fibrillation and anticoagulation on hold for GI bleed and epistaxis. She is at high risk for further strokes while off anticoag and benefits of such outweigh risks at this time, but monitoring for serious bleeding complications is imperative.  She is status post thrombolytic for stroke aborton--- with residual areas of stroke seen on MRI, but thankfully no hemorrhage. Her exam is stable    L carotid stenosis: hx of  LCEA--<50% b/l on last US and follows with vascular.  No responsible for this stroke    Plan:     -Pt and family would like to go back on Eliquis until seen by [facility]--ordered  -No need for ASA+anticoag from neuro standpoint  -Bleeding precautions reviewed  -Referral was placed to [facility] interventional cardio to discuss Watchman  -Continue OP therapies  -Mod vasc risk factors: goal BP <140/90, LDL <70, A1C <7.0  -Stroke s/s reviewed  -Signing off--call with new issues    Follow up in stroke clinic    ANTHONY Cowan - CNP  10:14 AM  9/24/2022

## 2022-09-24 NOTE — PROGRESS NOTES
Physician Progress Note      Joanna Mejia  CSN #:                  [id number]  :                       1940  ADMIT DATE:       2022 5:26 PM  [address] [place] DATE:  RESPONDING  PROVIDER #:        Yelena Guillen MD          QUERY TEXT:    Patient admitted with Acute CVA, noted to have atrial fibrillation in   PMH-H&P-. If possible, please document in progress notes and discharge   summary further specificity regarding the type of atrial fibrillation: The medical record reflects the following:  Risk Factors: CVA,HTN  Clinical Indicators:  Atrial fibrillation not on [address] at home, However patient   is intolerant of anticoagulation due to GI bleeds and epistaxis on several   occasions, secondary to this recommendation is made for watchman procedure   either here or in [facility] agreeable to either documented in progress   note on . Persistent atrial fibrillation was documented in active problem   list of progress note . Atrial fibrillation per nursing flowsheet   documentation; Atrial fibrillation on EKGS in EPIC dating back to 2018  Treatment: metoprolol succinate (TOPROL XL) 25 MG extended release tablet,   initial order for telemetry;    Chronic: nonspecific term that could be referring to paroxysmal, persistent,   or permanent  Longstanding persistent: persistent and continuous, lasting > 1 year. Paroxysmal - self-terminating or intermittent; resolves with or without   intervention within 7 days of onset; may recur with various frequency. Persistent - Fails to terminate within 7 days; Often requires meds or   cardioversion to restore to NSR. Permanent - longstanding & persistent; Medication has been ineffective in   restoring NSR &/or cardioversion is contraindicated    Definitions per [employer] Training Guide and Quick Reference Guide, Vick Connormar 112 5   Diseases and Disorders of the Circulatory System; 2019; "[employer]". Software content   from the "[employer]"?  Advanced [employer] Transformation Program  Options provided:  -- Persistent Atrial Fibrillation  -- Other - I will add my own diagnosis  -- Disagree - Not applicable / Not valid  -- Disagree - Clinically unable to determine / Unknown  -- Refer to Clinical Documentation Reviewer    PROVIDER RESPONSE TEXT:    This patient has persistent atrial fibrillation.     Query created by: Bud Dandy on 9/23/2022 3:49 PM      Electronically signed by:  Bozena Paez MD 9/24/2022 9:33 AM

## 2022-09-29 ENCOUNTER — TELEPHONE (OUTPATIENT)
Dept: NEUROLOGY | Age: 82
End: 2022-09-29

## 2022-09-29 NOTE — TELEPHONE ENCOUNTER
----- Message from ANTHONY Bella CNP sent at 9/23/2022  8:27 AM EDT -----  This pt was referred from the hospital to [facility] please make rachel this gets faxed.  Thanks

## 2022-09-30 ENCOUNTER — TELEPHONE (OUTPATIENT)
Dept: ADMINISTRATIVE | Age: 82
End: 2022-09-30

## 2022-09-30 NOTE — TELEPHONE ENCOUNTER
Pts daughter calling to schedule NP with \"Dr. Erum Charles" referral in the chart. Patient Appointment Form:      PCP: Dr. Magdalena Santiago  Referring: Dr. Magdalena Santiago    Has the Patient:    Seen a Cardiologist? Yes 2019 [facility]    Had a heart catheterization? no    Had heart surgery? no    Had a stress test or nuclear stress test? Yes 2019 Epic     Had an echocardiogram? Yes 2022 and 2019 Epic     Had a vascular ultrasound? Yes 2021 2019 Epic     Had a 24/48 heart monitor or extended cardiac event monitor? no    Had recent blood work in the last 6 months?      Had a pacemaker/ICD/ILR implant? no    Seen an Electrophysiologist? no        Will send records via: Prior [facility] recs in [place] - PCP recs at [place]       Date & time of appointment:  12/12/22 @ 1:30 with \"Dr. Erum Charles"

## 2022-10-03 NOTE — TELEPHONE ENCOUNTER
Left voicemail that we do not have earlier openings with Dr Vladimir Meyer but with other Doctors. Told her to call back if she would like to schedule with another doctor.

## 2022-10-21 NOTE — DISCHARGE SUMMARY
iterative reconstruction, and/or weight based adjustment of the mA/kV was utilized to reduce the radiation dose to as low as reasonably achievable. COMPARISON: None. HISTORY: ORDERING SYSTEM PROVIDED HISTORY: s/p tpa TECHNOLOGIST PROVIDED HISTORY: Has a \"code stroke\" or \"stroke alert\" been called? ->No Reason for exam:->s/p tpa What reading provider will be dictating this exam?->CRC FINDINGS: Parenchyma: No evidence of acute intracranial hemorrhage or mass effect. Chronic microvascular ischemic changes. 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 Ventricles: No evidence of hydrocephalus. Calvarium: No acute calvarial fracture is seen. No acute intracranial hemorrhage or mass effect. Chronic microvascular ischemic changes. CT Head WO Contrast    Result Date: 9/20/2022  EXAMINATION: CTA OF THE HEAD WITH CONTRAST WITH PERFUSION; CT OF THE HEAD WITHOUT CONTRAST; CTA OF THE HEAD WITH CONTRAST; CTA OF THE NECK 9/20/2022 5:53 pm: TECHNIQUE: CTA of the head/brain was performed with the administration of intravenous contrast. Multiplanar reformatted images are provided for review. MIP images are provided for review. Automated exposure control, iterative reconstruction, and/or weight based adjustment of the mA/kV was utilized to reduce the radiation dose to as low as reasonably achievable.; CT of the head was performed without the administration of intravenous contrast. Automated exposure control, iterative reconstruction, and/or weight based adjustment of the mA/kV was utilized to reduce the radiation dose to as low as reasonably achievable.; CTA of the neck was performed with the administration of intravenous contrast. Multiplanar reformatted images are provided for review. MIP images are provided for review. Stenosis of the internal carotid arteries measured using NASCET criteria.  Automated exposure control, iterative reconstruction, and/or weight based adjustment of the mA/kV was utilized to reduce the radiation dose to as low as reasonably achievable. Noncontrast CT of the head with reconstructed 2-D images are also provided for review. COMPARISON: None. HISTORY: ORDERING SYSTEM PROVIDED HISTORY: cva TECHNOLOGIST PROVIDED HISTORY: Reason for exam:->cva Has a \"code stroke\" or \"stroke alert\" been called? ->Yes Decision Support Exception - unselect if not a suspected or confirmed emergency medical condition->Emergency Medical Condition ([place]) What reading provider will be dictating this exam?->CRC FINDINGS: CT HEAD: BRAIN/VENTRICLES:  No acute intracranial hemorrhage or extraaxial fluid collection. Grey-white differentiation is maintained. No evidence of mass, mass effect or midline shift. No evidence of hydrocephalus. Moderate generalized volume loss. Mild chronic microangiopathic ischemic disease. Nonspecific focus of calcification is noted within the left superior parietal sulci. ORBITS: The visualized portion of the orbits demonstrate no acute abnormality. SINUSES:  The visualized paranasal sinuses and mastoid air cells demonstrate no acute abnormality. SOFT TISSUES/SKULL: No acute abnormality of the visualized skull or soft tissues. CTA NECK: AORTIC ARCH/ARCH VESSELS: No dissection or arterial injury. Moderate calcification of aortic arch extending into the origin of great arteries resulting in mild stenosis the origin of the left subclavian artery. CAROTID ARTERIES: No dissection, arterial injury. There is mild atherosclerotic calcification of bilateral carotid bulbs extending into the origin of internal carotid arteries resulting in mild 20% stenosis at the origin right cervical ICA. Rhenda Amy VERTEBRAL ARTERIES: No dissection, arterial injury, or significant stenosis. 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 SOFT TISSUES: The lung apices are clear. No cervical or superior mediastinal lymphadenopathy. The larynx and pharynx are unremarkable. No acute abnormality of the salivary and thyroid glands.  BONES: No acute osseous abnormality. CTA HEAD: ANTERIOR CIRCULATION: There is sudden cut offs of the superior cortical branch of left MCA cortical branch within left sylvian fissure. No other significant stenosis of the intracranial internal carotid, anterior cerebral, or middle cerebral arteries. No aneurysm. POSTERIOR CIRCULATION: Moderate stenosis of right PCA P2 segment. Multifocal areas of mild stenosis of left PCA P1 segment. No other significant stenosis of the vertebral, basilar, or posterior cerebral arteries. No aneurysm. OTHER: No dural venous sinus thrombosis on this non-dedicated study. CT PERFUSION: EXAM QUALITY: The examination is diagnostic with appropriate arterial inflow and venous outflow curves, and diagnostic perfusion maps. CORE INFARCT: The total area of ischemic core is 0 mL (CBF<30% volume). TOTAL HYPOPERFUSION: The total area of hypoperfusion is 27 mL (Tmax>6s volume). PENUMBRA: The penumbra (mismatch) volume is 27 mL and is located in the left temporoparietal region. Head CT: No acute intracranial abnormality. No acute territorial infarction, intracranial hemorrhage or mass lesion. Moderate generalized volume loss. Mild chronic microangiopathic ischemic disease. Cta neck: Mild 20% stenosis at the origin right cervical ICA. Cta head: sudden cut off of the superior cortical branch of left MCA within left sylvian fissure. Moderate stenosis of right PCA P2 segment. Multifocal areas of mild stenosis of left PCA P1 segment. Ct perfusion: No infarct core. 27 cc focus of hypoperfusion within left temporoparietal area. Critical results were called by Dr. Mendez Gannon MD to Dr. Mónica Fitzgerald  on 9/20/2022 at 18:26.      XR CHEST PORTABLE    Result Date: 9/20/2022  EXAMINATION: ONE XRAY VIEW OF THE CHEST 9/20/2022 6:28 pm COMPARISON: 04/11/2022 HISTORY: ORDERING SYSTEM PROVIDED HISTORY: cva TECHNOLOGIST PROVIDED HISTORY: Reason for exam:->cva What reading provider will be dictating this exam?->CRC FINDINGS: The lungs are without acute focal process. There is no effusion or pneumothorax. The cardiomediastinal silhouette is without acute process. The osseous structures are without acute process. No acute process. CTA NECK W CONTRAST    Result Date: 9/20/2022  EXAMINATION: CTA OF THE HEAD WITH CONTRAST WITH PERFUSION; CT OF THE HEAD WITHOUT CONTRAST; CTA OF THE HEAD WITH CONTRAST; CTA OF THE NECK 9/20/2022 5:53 pm: TECHNIQUE: CTA of the head/brain was performed with the administration of intravenous contrast. Multiplanar reformatted images are provided for review. MIP images are provided for review. Automated exposure control, iterative reconstruction, and/or weight based adjustment of the mA/kV was utilized to reduce the radiation dose to as low as reasonably achievable.; CT of the head was performed without the administration of intravenous contrast. Automated exposure control, iterative reconstruction, and/or weight based adjustment of the mA/kV was utilized to reduce the radiation dose to as low as reasonably achievable.; CTA of the neck was performed with the administration of intravenous contrast. Multiplanar reformatted images are provided for review. MIP images are provided for review. Stenosis of the internal carotid arteries measured using NASCET criteria. Automated exposure control, iterative reconstruction, and/or weight based adjustment of the mA/kV was utilized to reduce the radiation dose to as low as reasonably achievable. Noncontrast CT of the head with reconstructed 2-D images are also provided for review. COMPARISON: None. HISTORY: ORDERING SYSTEM PROVIDED HISTORY: cva TECHNOLOGIST PROVIDED HISTORY: Reason for exam:->cva Has a \"code stroke\" or \"stroke alert\" been called? ->Yes Decision Support Exception - unselect if not a suspected or confirmed emergency medical condition->Emergency Medical Condition ([place]) What reading provider will be dictating this exam?->CRC FINDINGS: CT HEAD: BRAIN/VENTRICLES:  No acute intracranial hemorrhage or extraaxial fluid collection. Grey-white differentiation is maintained. No evidence of mass, mass effect or midline shift. No evidence of hydrocephalus. Moderate generalized volume loss. Mild chronic microangiopathic ischemic disease. Nonspecific focus of calcification is noted within the left superior parietal sulci. ORBITS: The visualized portion of the orbits demonstrate no acute abnormality. SINUSES:  The visualized paranasal sinuses and mastoid air cells demonstrate no acute abnormality. SOFT TISSUES/SKULL: No acute abnormality of the visualized skull or soft tissues. CTA NECK: AORTIC ARCH/ARCH VESSELS: No dissection or arterial injury. Moderate calcification of aortic arch extending into the origin of great arteries resulting in mild stenosis the origin of the left subclavian artery. CAROTID ARTERIES: No dissection, arterial injury. There is mild atherosclerotic calcification of bilateral carotid bulbs extending into the origin of internal carotid arteries resulting in mild 20% stenosis at the origin right cervical ICA. Rhenda Amy VERTEBRAL ARTERIES: No dissection, arterial injury, or significant stenosis. 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 SOFT TISSUES: The lung apices are clear. No cervical or superior mediastinal lymphadenopathy. The larynx and pharynx are unremarkable. No acute abnormality of the salivary and thyroid glands. BONES: No acute osseous abnormality. CTA HEAD: ANTERIOR CIRCULATION: There is sudden cut offs of the superior cortical branch of left MCA cortical branch within left sylvian fissure. No other significant stenosis of the intracranial internal carotid, anterior cerebral, or middle cerebral arteries. No aneurysm. POSTERIOR CIRCULATION: Moderate stenosis of right PCA P2 segment. Multifocal areas of mild stenosis of left PCA P1 segment. No other significant stenosis of the vertebral, basilar, or posterior cerebral arteries. No aneurysm. achievable.; CTA of the neck was performed with the administration of intravenous contrast. Multiplanar reformatted images are provided for review. MIP images are provided for review. Stenosis of the internal carotid arteries measured using NASCET criteria. Automated exposure control, iterative reconstruction, and/or weight based adjustment of the mA/kV was utilized to reduce the radiation dose to as low as reasonably achievable. Noncontrast CT of the head with reconstructed 2-D images are also provided for review. COMPARISON: None. HISTORY: ORDERING SYSTEM PROVIDED HISTORY: cva TECHNOLOGIST PROVIDED HISTORY: Reason for exam:->cva Has a \"code stroke\" or \"stroke alert\" been called? ->Yes Decision Support Exception - unselect if not a suspected or confirmed emergency medical condition->Emergency Medical Condition ([place]) What reading provider will be dictating this exam?->CRC FINDINGS: CT HEAD: BRAIN/VENTRICLES:  No acute intracranial hemorrhage or extraaxial fluid collection. Grey-white differentiation is maintained. No evidence of mass, mass effect or midline shift. No evidence of hydrocephalus. Moderate generalized volume loss. Mild chronic microangiopathic ischemic disease. Nonspecific focus of calcification is noted within the left superior parietal sulci. ORBITS: The visualized portion of the orbits demonstrate no acute abnormality. SINUSES:  The visualized paranasal sinuses and mastoid air cells demonstrate no acute abnormality. SOFT TISSUES/SKULL: No acute abnormality of the visualized skull or soft tissues. CTA NECK: AORTIC ARCH/ARCH VESSELS: No dissection or arterial injury. Moderate calcification of aortic arch extending into the origin of great arteries resulting in mild stenosis the origin of the left subclavian artery. CAROTID ARTERIES: No dissection, arterial injury.   There is mild atherosclerotic calcification of bilateral carotid bulbs extending into the origin of internal carotid arteries resulting in mild 20% stenosis at the origin right cervical ICA. Algis Broaden VERTEBRAL ARTERIES: No dissection, arterial injury, or significant stenosis. 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 SOFT TISSUES: The lung apices are clear. No cervical or superior mediastinal lymphadenopathy. The larynx and pharynx are unremarkable. No acute abnormality of the salivary and thyroid glands. BONES: No acute osseous abnormality. CTA HEAD: ANTERIOR CIRCULATION: There is sudden cut offs of the superior cortical branch of left MCA cortical branch within left sylvian fissure. No other significant stenosis of the intracranial internal carotid, anterior cerebral, or middle cerebral arteries. No aneurysm. POSTERIOR CIRCULATION: Moderate stenosis of right PCA P2 segment. Multifocal areas of mild stenosis of left PCA P1 segment. No other significant stenosis of the vertebral, basilar, or posterior cerebral arteries. No aneurysm. OTHER: No dural venous sinus thrombosis on this non-dedicated study. CT PERFUSION: EXAM QUALITY: The examination is diagnostic with appropriate arterial inflow and venous outflow curves, and diagnostic perfusion maps. CORE INFARCT: The total area of ischemic core is 0 mL (CBF<30% volume). TOTAL HYPOPERFUSION: The total area of hypoperfusion is 27 mL (Tmax>6s volume). PENUMBRA: The penumbra (mismatch) volume is 27 mL and is located in the left temporoparietal region. Head CT: No acute intracranial abnormality. No acute territorial infarction, intracranial hemorrhage or mass lesion. Moderate generalized volume loss. Mild chronic microangiopathic ischemic disease. Cta neck: Mild 20% stenosis at the origin right cervical ICA. Cta head: sudden cut off of the superior cortical branch of left MCA within left sylvian fissure. Moderate stenosis of right PCA P2 segment. Multifocal areas of mild stenosis of left PCA P1 segment. Ct perfusion: No infarct core.   27 cc focus of hypoperfusion within left temporoparietal area. Critical results were called by Dr. Beth Ly MD to Dr. Juanjose Hwang  on 9/20/2022 at 18:26. CTA HEAD W CONTRAST    Result Date: 9/20/2022  EXAMINATION: CTA OF THE HEAD WITH CONTRAST WITH PERFUSION; CT OF THE HEAD WITHOUT CONTRAST; CTA OF THE HEAD WITH CONTRAST; CTA OF THE NECK 9/20/2022 5:53 pm: TECHNIQUE: CTA of the head/brain was performed with the administration of intravenous contrast. Multiplanar reformatted images are provided for review. MIP images are provided for review. Automated exposure control, iterative reconstruction, and/or weight based adjustment of the mA/kV was utilized to reduce the radiation dose to as low as reasonably achievable.; CT of the head was performed without the administration of intravenous contrast. Automated exposure control, iterative reconstruction, and/or weight based adjustment of the mA/kV was utilized to reduce the radiation dose to as low as reasonably achievable.; CTA of the neck was performed with the administration of intravenous contrast. Multiplanar reformatted images are provided for review. MIP images are provided for review. Stenosis of the internal carotid arteries measured using NASCET criteria. Automated exposure control, iterative reconstruction, and/or weight based adjustment of the mA/kV was utilized to reduce the radiation dose to as low as reasonably achievable. Noncontrast CT of the head with reconstructed 2-D images are also provided for review. COMPARISON: None. HISTORY: ORDERING SYSTEM PROVIDED HISTORY: cva TECHNOLOGIST PROVIDED HISTORY: Reason for exam:->cva Has a \"code stroke\" or \"stroke alert\" been called? ->Yes Decision Support Exception - unselect if not a suspected or confirmed emergency medical condition->Emergency Medical Condition ([place]) What reading provider will be dictating this exam?->CRC FINDINGS: CT HEAD: BRAIN/VENTRICLES:  No acute intracranial hemorrhage or extraaxial fluid collection.  Grey-white differentiation is maintained. No evidence of mass, mass effect or midline shift. No evidence of hydrocephalus. Moderate generalized volume loss. Mild chronic microangiopathic ischemic disease. Nonspecific focus of calcification is noted within the left superior parietal sulci. ORBITS: The visualized portion of the orbits demonstrate no acute abnormality. SINUSES:  The visualized paranasal sinuses and mastoid air cells demonstrate no acute abnormality. SOFT TISSUES/SKULL: No acute abnormality of the visualized skull or soft tissues. CTA NECK: AORTIC ARCH/ARCH VESSELS: No dissection or arterial injury. Moderate calcification of aortic arch extending into the origin of great arteries resulting in mild stenosis the origin of the left subclavian artery. CAROTID ARTERIES: No dissection, arterial injury. There is mild atherosclerotic calcification of bilateral carotid bulbs extending into the origin of internal carotid arteries resulting in mild 20% stenosis at the origin right cervical ICA. Sherryle Moder VERTEBRAL ARTERIES: No dissection, arterial injury, or significant stenosis. 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 SOFT TISSUES: The lung apices are clear. No cervical or superior mediastinal lymphadenopathy. The larynx and pharynx are unremarkable. No acute abnormality of the salivary and thyroid glands. BONES: No acute osseous abnormality. CTA HEAD: ANTERIOR CIRCULATION: There is sudden cut offs of the superior cortical branch of left MCA cortical branch within left sylvian fissure. No other significant stenosis of the intracranial internal carotid, anterior cerebral, or middle cerebral arteries. No aneurysm. POSTERIOR CIRCULATION: Moderate stenosis of right PCA P2 segment. Multifocal areas of mild stenosis of left PCA P1 segment. No other significant stenosis of the vertebral, basilar, or posterior cerebral arteries. No aneurysm. OTHER: No dural venous sinus thrombosis on this non-dedicated study.  CT PERFUSION: EXAM QUALITY: The examination is diagnostic with appropriate arterial inflow and venous outflow curves, and diagnostic perfusion maps. CORE INFARCT: The total area of ischemic core is 0 mL (CBF<30% volume). TOTAL HYPOPERFUSION: The total area of hypoperfusion is 27 mL (Tmax>6s volume). PENUMBRA: The penumbra (mismatch) volume is 27 mL and is located in the left temporoparietal region. Head CT: No acute intracranial abnormality. No acute territorial infarction, intracranial hemorrhage or mass lesion. Moderate generalized volume loss. Mild chronic microangiopathic ischemic disease. Cta neck: Mild 20% stenosis at the origin right cervical ICA. Cta head: sudden cut off of the superior cortical branch of left MCA within left sylvian fissure. Moderate stenosis of right PCA P2 segment. Multifocal areas of mild stenosis of left PCA P1 segment. Ct perfusion: No infarct core. 27 cc focus of hypoperfusion within left temporoparietal area. Critical results were called by Dr. Symone Slater MD to Dr. Anton Vallejo  on 9/20/2022 at 18:26. Discharge Exam:    HEENT: NCAT,  PERRLA, No JVD  Heart:  RRR, no murmurs, gallops, or rubs.   Lungs:  CTA bilaterally, no wheeze, rales or rhonchi  Abd: bowel sounds present, nontender, nondistended, no masses  Extrem:  No clubbing, cyanosis, or edema    Disposition: SNF     Patient Condition at Discharge: Stable    Patient Instructions:      Medication List        START taking these medications      apixaban 5 MG Tabs tablet  Commonly known as: ELIQUIS  Take 1 tablet by mouth 2 times daily for 60 doses            CONTINUE taking these medications      amLODIPine 5 MG tablet  Commonly known as: NORVASC     dorzolamide-timolol 22.3-6.8 MG/ML ophthalmic solution  Commonly known as: COSOPT     latanoprost 0.005 % ophthalmic solution  Commonly known as: XALATAN     lisinopril 20 MG tablet  Commonly known as: PRINIVIL;ZESTRIL     promethazine 25 MG tablet  Commonly known as: PHENERGAN     VITAMIN B 12 PO            ASK your doctor about these medications      ALPRAZolam 0.5 MG tablet  Commonly known as: XANAX     atorvastatin 40 MG tablet  Commonly known as: LIPITOR  Take 1 tablet by mouth nightly     metoprolol succinate 25 MG extended release tablet  Commonly known as: TOPROL XL  Take 1 tablet by mouth 2 times daily               Where to Get Your Medications        These medications were sent to [address] [place] [address], [address] [place] [address]      Phone: [phone]   apixaban 5 MG Tabs tablet       Activity: activity as tolerated  Diet: cardiac diet and diabetic diet    Pt has been advised to: Follow-up with Melissa Veliz MD in 1 week.   Follow-up with consultants as recommended by them    Note that over 30 minutes was spent in preparing discharge papers, discussing discharge with patient, medication review, etc.    Signed:  Lexi Stone MD  9/24/2022

## 2022-11-27 NOTE — PROGRESS NOTES
Patient reports with asthma exacerbation  On admission required BiPAP  She was monitored off for about 15 minutes before noticing increased work of breathing and was replaced on BiPAP    Will continue scheduled nebs and IV Solu-Medrol q 6 hours  Will follow-up VBG  Step-down to med surge; low threshold to upgrade as needed  Currently saturating well on 3 L; may have some component of anxiety and requests BiPAP sporadically SPEECH/LANGUAGE PATHOLOGY  SPEECH/LANGUAGE/COGNITIVE EVALUATION   and PLAN OF CARE      PATIENT NAME:  Leighann Wild  (female)     MRN:  [id number]    :  1940  (78 y.o.)  STATUS:  Inpatient: Room 4523/45-A    TODAY'S DATE:  2215    SLP cognitive language evaluation  Start:  22,   End:  22,   ONE TIME,   Standing Count:  1 Occurrences,   [address], [facility]   REASON FOR REFERRAL:  assess speech/language/cognition   EVALUATING THERAPIST: LEENA Dowling    ADMITTING DIAGNOSIS: Acute CVA (cerebrovascular accident) 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, Mild Cognitive Deficits    Speech Pathology intervention is recommended up to 6 times per week for LOS or when goals are met with emphasis on the following:      Conditions Requiring Skilled Therapeutic Intervention for speech, language and/or cognition    Expressive Aphasia   Anomia    Specific Speech Therapy Interventions to Include:   Expressive language training     Specific instructions for next treatment:      To initiate POC    SHORT/LONG TERM GOALS  Pt will improve immediate, short term, recent memory during structured and unstructured tasks with 90% accuracy   Pt will improve problem solving/thought organization during structured and unstructured tasks with 90% accuracy   Pt will improve receptive and expressive language skills with adequate thought content, organization, and processing time to facilitate improved communication with minimal.  Pt Solving/Reasoning   Verbal Sequencing: To be assessed        Verbal Problem solving:   Impaired          CLINICAL OBSERVATIONS NOTED DURING THE EVALUATION  Latent responses, Inconsistent responses, Perseveration errors, Anomic errors, Paraphasic errors, and Cueing was required                  EDUCATION:   The Speech Language Pathologist (SLP) completed education regarding results of evaluation and that intervention is warranted at this time. Learner: Patient  Education: Reviewed results and recommendations of this evaluation  Evaluation of Education:  Verbalizes understanding    Evaluation Time includes thorough review of current medical information, gathering information on past medical history/social history and prior level of function, completion of standardized testing/informal observation of tasks, assessment of data and education on plan of care and goals. CPT code:    79969  eval speech sound lang comprehension      The admitting diagnosis and active problem list, as listed below have been reviewed prior to initiation of this evaluation. ACTIVE PROBLEM LIST:   Patient Active Problem List   Diagnosis    Posterior choroidal artery infarction ([facility] [address]    Persistent atrial fibrillation (HCC)    Essential hypertension    Mixed hyperlipidemia    Stenosis of left carotid artery    Bilateral carotid artery stenosis    GI bleed    Acute CVA (cerebrovascular accident) [facility])    Atrial fibrillation [facility])       INTERVENTION  CPT Code: 52238  speech/language tx    Speech Pathologist (SLP) completed education with the patient/family regarding type of communication impairment. Discussed compensatory strategies to promote ease of expression and to improve overall comprehension. Pt did benefit from SLP intervention techniques such as carrier phrases, semantic cues, phonemic cues and redirection. Mild perseveration noted at times. Phonemic errors present but Pt attempting to correct indep.  Encouraged patient and/or family to engage SLP in unstructured Q&A session relative to identified deficit areas. Understanding of all information provided was indicated via satisfactory verbal response.       Audra Renae M.S., [address] Pathologist  [id number]  9/21/2022

## 2022-11-29 RX ORDER — APIXABAN 5 MG/1
TABLET, FILM COATED ORAL
Qty: 60 TABLET | Refills: 1 | Status: SHIPPED | OUTPATIENT
Start: 2022-11-29

## 2022-11-29 NOTE — TELEPHONE ENCOUNTER
Please make sure she has an appt for follow up--and that she has got into [facility] for Watchman device evaluation.

## 2023-01-30 ENCOUNTER — FOLLOWUP TELEPHONE ENCOUNTER (OUTPATIENT)
Dept: INPATIENT UNIT | Age: 83
End: 2023-01-30

## 2023-04-05 NOTE — TELEPHONE ENCOUNTER
Stroke 90 Day MRS Discharge Follow Up      Discharge MRS: 4 per OT note    Post 90 Day MRS: 3        Spoke with:daughter  Discharge Disposition: home with home health care      Electronically signed by Anthony Hi RN on 1/30/2023 at 12:01 PM Average build

## 2023-08-31 DIAGNOSIS — I65.23 BILATERAL CAROTID ARTERY STENOSIS: Primary | ICD-10-CM

## 2023-10-30 ENCOUNTER — APPOINTMENT (OUTPATIENT)
Dept: GENERAL RADIOLOGY | Age: 83
DRG: 522 | End: 2023-10-30
Payer: MEDICARE

## 2023-10-30 ENCOUNTER — APPOINTMENT (OUTPATIENT)
Dept: CT IMAGING | Age: 83
DRG: 522 | End: 2023-10-30
Payer: MEDICARE

## 2023-10-30 ENCOUNTER — HOSPITAL ENCOUNTER (INPATIENT)
Age: 83
LOS: 3 days | Discharge: OTHER INSTITUTION | DRG: 522 | End: 2023-11-02
Attending: STUDENT IN AN ORGANIZED HEALTH CARE EDUCATION/TRAINING PROGRAM | Admitting: INTERNAL MEDICINE
Payer: MEDICARE

## 2023-10-30 DIAGNOSIS — S72.92XA CLOSED FRACTURE OF LEFT FEMUR, UNSPECIFIED FRACTURE MORPHOLOGY, UNSPECIFIED PORTION OF FEMUR, INITIAL ENCOUNTER (HCC): ICD-10-CM
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
## 2023-10-30 DIAGNOSIS — S72.002A CLOSED FRACTURE OF LEFT HIP, INITIAL ENCOUNTER (HCC): ICD-10-CM

## 2023-10-30 LAB
ABO + RH BLD: NORMAL
ALBUMIN SERPL-MCNC: 4.1 G/DL (ref 3.5–5.2)
ALP SERPL-CCNC: 104 U/L (ref 35–104)
ALT SERPL-CCNC: 14 U/L (ref 0–32)
ANION GAP SERPL CALCULATED.3IONS-SCNC: 9 MMOL/L (ref 7–16)
ARM BAND NUMBER: NORMAL
AST SERPL-CCNC: 19 U/L (ref 0–31)
BASOPHILS # BLD: 0.04 K/UL (ref 0–0.2)
BASOPHILS NFR BLD: 0 % (ref 0–2)
BILIRUB SERPL-MCNC: 1.4 MG/DL (ref 0–1.2)
BLOOD BANK SAMPLE EXPIRATION: NORMAL
BLOOD GROUP ANTIBODIES SERPL: NEGATIVE
BUN SERPL-MCNC: 20 MG/DL (ref 6–23)
CALCIUM SERPL-MCNC: 8.8 MG/DL (ref 8.6–10.2)
CHLORIDE SERPL-SCNC: 104 MMOL/L (ref 98–107)
CO2 SERPL-SCNC: 24 MMOL/L (ref 22–29)
CREAT SERPL-MCNC: 0.8 MG/DL (ref 0.5–1)
EKG ATRIAL RATE: 67 BPM
EKG Q-T INTERVAL: 438 MS
EKG QRS DURATION: 116 MS
EKG QTC CALCULATION (BAZETT): 469 MS
EKG R AXIS: -93 DEGREES
EKG T AXIS: 126 DEGREES
EKG VENTRICULAR RATE: 69 BPM
EOSINOPHIL # BLD: 0.02 K/UL (ref 0.05–0.5)
EOSINOPHILS RELATIVE PERCENT: 0 % (ref 0–6)
ERYTHROCYTE [DISTWIDTH] IN BLOOD BY AUTOMATED COUNT: 15.7 % (ref 11.5–15)
GFR SERPL CREATININE-BSD FRML MDRD: >60 ML/MIN/1.73M2
GLUCOSE SERPL-MCNC: 122 MG/DL (ref 74–99)
HCT VFR BLD AUTO: 36.1 % (ref 34–48)
HGB BLD-MCNC: 11.7 G/DL (ref 11.5–15.5)
IMM GRANULOCYTES # BLD AUTO: 0.06 K/UL (ref 0–0.58)
IMM GRANULOCYTES NFR BLD: 1 % (ref 0–5)
INR PPP: 1.5
LYMPHOCYTES NFR BLD: 0.55 K/UL (ref 1.5–4)
LYMPHOCYTES RELATIVE PERCENT: 6 % (ref 20–42)
MCH RBC QN AUTO: 28.5 PG (ref 26–35)
MCHC RBC AUTO-ENTMCNC: 32.4 G/DL (ref 32–34.5)
MCV RBC AUTO: 88 FL (ref 80–99.9)
MONOCYTES NFR BLD: 0.43 K/UL (ref 0.1–0.95)
MONOCYTES NFR BLD: 5 % (ref 2–12)
NEUTROPHILS NFR BLD: 88 % (ref 43–80)
NEUTS SEG NFR BLD: 8.33 K/UL (ref 1.8–7.3)
PARTIAL THROMBOPLASTIN TIME: 31.3 SEC (ref 24.5–35.1)
PLATELET # BLD AUTO: 164 K/UL (ref 130–450)
PMV BLD AUTO: 9.2 FL (ref 7–12)
POTASSIUM SERPL-SCNC: 4.4 MMOL/L (ref 3.5–5)
PROT SERPL-MCNC: 7.4 G/DL (ref 6.4–8.3)
PROTHROMBIN TIME: 16.9 SEC (ref 9.3–12.4)
RBC # BLD AUTO: 4.1 M/UL (ref 3.5–5.5)
RBC # BLD: ABNORMAL 10*6/UL
SODIUM SERPL-SCNC: 137 MMOL/L (ref 132–146)
WBC OTHER # BLD: 9.4 K/UL (ref 4.5–11.5)

## 2023-10-30 PROCEDURE — 86900 BLOOD TYPING SEROLOGIC ABO: CPT

## 2023-10-30 PROCEDURE — 93005 ELECTROCARDIOGRAM TRACING: CPT | Performed by: STUDENT IN AN ORGANIZED HEALTH CARE EDUCATION/TRAINING PROGRAM

## 2023-10-30 PROCEDURE — 6370000000 HC RX 637 (ALT 250 FOR IP)

## 2023-10-30 PROCEDURE — 6360000002 HC RX W HCPCS: Performed by: NURSE PRACTITIONER

## 2023-10-30 PROCEDURE — 73521 X-RAY EXAM HIPS BI 2 VIEWS: CPT

## 2023-10-30 PROCEDURE — 1200000000 HC SEMI PRIVATE

## 2023-10-30 PROCEDURE — 85610 PROTHROMBIN TIME: CPT

## 2023-10-30 PROCEDURE — 86850 RBC ANTIBODY SCREEN: CPT

## 2023-10-30 PROCEDURE — 99285 EMERGENCY DEPT VISIT HI MDM: CPT

## 2023-10-30 PROCEDURE — 85730 THROMBOPLASTIN TIME PARTIAL: CPT

## 2023-10-30 PROCEDURE — 80053 COMPREHEN METABOLIC PANEL: CPT

## 2023-10-30 PROCEDURE — 73552 X-RAY EXAM OF FEMUR 2/>: CPT

## 2023-10-30 PROCEDURE — 93010 ELECTROCARDIOGRAM REPORT: CPT | Performed by: INTERNAL MEDICINE

## 2023-10-30 PROCEDURE — 6360000002 HC RX W HCPCS: Performed by: STUDENT IN AN ORGANIZED HEALTH CARE EDUCATION/TRAINING PROGRAM

## 2023-10-30 PROCEDURE — 71045 X-RAY EXAM CHEST 1 VIEW: CPT

## 2023-10-30 PROCEDURE — 70450 CT HEAD/BRAIN W/O DYE: CPT

## 2023-10-30 PROCEDURE — 72125 CT NECK SPINE W/O DYE: CPT

## 2023-10-30 PROCEDURE — 86901 BLOOD TYPING SEROLOGIC RH(D): CPT

## 2023-10-30 PROCEDURE — 85025 COMPLETE CBC W/AUTO DIFF WBC: CPT

## 2023-10-30 RX ORDER — MORPHINE SULFATE 4 MG/ML
4 INJECTION, SOLUTION INTRAMUSCULAR; INTRAVENOUS ONCE
Status: DISCONTINUED | OUTPATIENT
Start: 2023-10-30 | End: 2023-10-30

## 2023-10-30 RX ORDER — MORPHINE SULFATE 2 MG/ML
2 INJECTION, SOLUTION INTRAMUSCULAR; INTRAVENOUS EVERY 4 HOURS PRN
Status: DISCONTINUED | OUTPATIENT
Start: 2023-10-30 | End: 2023-11-02 | Stop reason: HOSPADM

## 2023-10-30 RX ORDER — FENTANYL CITRATE 50 UG/ML
25 INJECTION, SOLUTION INTRAMUSCULAR; INTRAVENOUS ONCE
Status: COMPLETED | OUTPATIENT
Start: 2023-10-30 | End: 2023-10-30

## 2023-10-30 RX ORDER — FENTANYL CITRATE 50 UG/ML
50 INJECTION, SOLUTION INTRAMUSCULAR; INTRAVENOUS ONCE
Status: DISCONTINUED | OUTPATIENT
Start: 2023-10-30 | End: 2023-11-02 | Stop reason: HOSPADM

## 2023-10-30 RX ORDER — CYCLOBENZAPRINE HCL 10 MG
10 TABLET ORAL ONCE
Status: COMPLETED | OUTPATIENT
Start: 2023-10-30 | End: 2023-10-30

## 2023-10-30 RX ORDER — HYDROCODONE BITARTRATE AND ACETAMINOPHEN 5; 325 MG/1; MG/1
1 TABLET ORAL
Status: DISCONTINUED | OUTPATIENT
Start: 2023-10-30 | End: 2023-10-30

## 2023-10-30 RX ORDER — ONDANSETRON 2 MG/ML
4 INJECTION INTRAMUSCULAR; INTRAVENOUS ONCE
Status: COMPLETED | OUTPATIENT
Start: 2023-10-30 | End: 2023-10-30

## 2023-10-30 RX ORDER — ONDANSETRON 4 MG/1
4 TABLET, ORALLY DISINTEGRATING ORAL ONCE
Status: DISCONTINUED | OUTPATIENT
Start: 2023-10-30 | End: 2023-10-30

## 2023-10-30 RX ADMIN — ONDANSETRON 4 MG: 2 INJECTION INTRAMUSCULAR; INTRAVENOUS at 14:15

## 2023-10-30 RX ADMIN — CYCLOBENZAPRINE 10 MG: 10 TABLET, FILM COATED ORAL at 12:13

## 2023-10-30 RX ADMIN — FENTANYL CITRATE 25 MCG: 50 INJECTION INTRAMUSCULAR; INTRAVENOUS at 14:16

## 2023-10-30 RX ADMIN — MORPHINE SULFATE 2 MG: 2 INJECTION, SOLUTION INTRAMUSCULAR; INTRAVENOUS at 20:39

## 2023-10-30 ASSESSMENT — PAIN DESCRIPTION - DESCRIPTORS
DESCRIPTORS: ACHING
DESCRIPTORS: ACHING

## 2023-10-30 ASSESSMENT — ENCOUNTER SYMPTOMS
WHEEZING: 0
DIARRHEA: 0
TROUBLE SWALLOWING: 0
SHORTNESS OF BREATH: 0
NAUSEA: 0
BACK PAIN: 0
RHINORRHEA: 0
COUGH: 0
VOMITING: 0
VOICE CHANGE: 0
ABDOMINAL PAIN: 0
PHOTOPHOBIA: 0

## 2023-10-30 ASSESSMENT — PAIN SCALES - GENERAL
PAINLEVEL_OUTOF10: 10
PAINLEVEL_OUTOF10: 10
PAINLEVEL_OUTOF10: 8
PAINLEVEL_OUTOF10: 7

## 2023-10-30 ASSESSMENT — PAIN DESCRIPTION - LOCATION
LOCATION: HIP
LOCATION: LEG
LOCATION: HIP;LEG
LOCATION: HIP

## 2023-10-30 ASSESSMENT — PAIN DESCRIPTION - ORIENTATION
ORIENTATION: LEFT

## 2023-10-30 NOTE — ED PROVIDER NOTES
[address]      Pt Name: Bravo Cazares  MRN: [id number]  [address] [facility] 1940  Date of evaluation: 10/30/2023  Provider: Beth Jernigan DO  PCP: Monico Osorio MD    HPI: 52-year-old female present emerged department complaints of mechanical fall at home. Patient reports that she was walking and tripped and fell landing backwards onto her back. Patient reports discomfort of the left hip. She denies any loss of consciousness. Dizziness chest pain shortness of breath numbness or weakness. Reports discomfort feels like a tingling joint. Sensation grossly intact with pulses present patient is on blood thinners, takes Eliquis at home. Reports took medications this morning. EMS reported the patient was placed 80% on room air placed on 2 L oxygen  Chief Complaint   Patient presents with    Fall     Pt tripped and fell at home. +thinners, -loc . Pt reports left hip pain       Review of Systems   Constitutional:  Negative for chills, fatigue and fever. HENT:  Negative for congestion, rhinorrhea, trouble swallowing and voice change. Eyes:  Negative for photophobia and visual disturbance. Respiratory:  Negative for cough, shortness of breath and wheezing. Cardiovascular:  Negative for chest pain and palpitations. Gastrointestinal:  Negative for abdominal pain, diarrhea, nausea and vomiting. Musculoskeletal:  Negative for arthralgias, back pain, neck pain and neck stiffness. Skin:  Negative for rash and wound. Neurological:  Negative for dizziness, syncope, weakness, light-headedness, numbness and headaches. Psychiatric/Behavioral:  Negative for behavioral problems and confusion. The patient is not nervous/anxious. Physical Exam  Vitals reviewed. Constitutional:       General: She is not in acute distress. Appearance: Normal appearance. HENT:      Head: Normocephalic.       Right Ear: External ear normal.      Left Ear: External ear normal. noted in MDM. Final Interpretation per the Radiologist below, if available at the time of this note:    CT Head W/O Contrast   Final Result   1. There is no acute intracranial abnormality. Specifically, there is no   intracranial hemorrhage. 2. Atrophy and periventricular leukomalacia,   3. Old lacunar infarct within the left basal ganglia. .         CT CSpine W/O Contrast   Final Result   1. There is no acute compression fracture or subluxation of the cervical   spine. 2. Multilevel degenerative disc and degenerative joint disease. XR HIP BILATERAL W AP PELVIS (2 VIEWS)   Final Result   1. Subcapital fracture of the left femoral neck. 2. Mild degenerative change. XR FEMUR LEFT (MIN 2 VIEWS)   Final Result   1. Left femoral neck fracture as noted above. 2. No other acute fracture or dislocation seen in the left femur. 3. Degenerative change. XR CHEST 1 VIEW   Final Result   1. Cardiomegaly. There are no findings of failure or pneumonia. No results found. No results found. EKG: This EKG is signed by emergency department physician. Rate: 69  Rhythm: Atrial fibrillation  Interpretation: atrial fibrillation (chronic)  Comparison: changes compared to previous EKG     MDM(including HPI):  78y.o. year old female presenting to the ER with complaints of left hip pain. Patient reports that she tripped and fell at home while walking. Patient reports that she landed on the left side. Patient alert and oriented x4 initial exam able to answer questions without difficulty. Denies any numbness or weakness. Denies any loss of consciousness. Elevation white count stable hemoglobin electrolytes within normal limits kidney function within normal limits INR less than 2. CT head negative for acute abnormalities with old lacunar infarcts noted in basal ganglia. CT cervical spine with degeneration.   Chest x-ray with cardiomegaly x-rays demonstrating left femoral neck

## 2023-10-30 NOTE — H&P
[facility]  History and Physical      CHIEF COMPLAINT:    Chief Complaint   Patient presents with    Fall     Pt tripped and fell at home. +thinners, -loc . Pt reports left hip pain        Patient of Rakesh Maldonado MD presents with: Falls, initial encounter    History of Present Illness:   Patient is an 78-year-old female with a past medical history of atrial fibrillation on Eliquis, CVA, DVTs, HLD, HTN who presents to the emergency room for fall. Patient sustained a fall after tripping at her home. She denies any dizziness prior to the fall. The CT head and C-spine were both of obtained on arrival however no acute findings were noted. Patient underwent an x-ray of her bilateral hips revealing a subcapital fracture of the left femoral neck. Orthopedic surgery was consulted for further evaluation and possible surgical intervention if warranted. Labs still pending on evaluation. Patient will be admitted to [address] for further work-up and treatment. Patient recently had a Watchman procedure in April 2023 in [facility] [employer] She has followed up with her electrophysiologist in September, no acute issues at the time of follow-up. On my evaluation she denies any chest heaviness pain or discomfort. She denies any shortness of breath. She is comfortable without any active hip pain currently. REVIEW OF SYSTEMS:  Pertinent negatives are above in HPI. 10 point ROS otherwise negative.       Past Medical History:   Diagnosis Date    Arthritis     Atrial fibrillation ([address])     Carotid stenosis, right     Cerebral artery occlusion with cerebral infarction (HCC)     MEMORY ISSUES, GAIT ISSUES    Glaucoma     History of blood transfusion 1953    rupture appendicitis    Hx of blood clots     developed in leg after appendectomy age 13 yrs     Hyperlipidemia     Hypertension     IBS (irritable bowel syndrome)     Nausea     Nosebleed 08/09/2019    in [place] dr Jayne Gonzales left nostril packing    UTI (lower urinary necessary.     DVT prophylaxis PCD's  PT OT  Discharge planning      Code status: Full  Requires Inpatient level of care    Isaura Alexander MD

## 2023-10-31 ENCOUNTER — APPOINTMENT (OUTPATIENT)
Dept: GENERAL RADIOLOGY | Age: 83
DRG: 522 | End: 2023-10-31
Payer: MEDICARE

## 2023-10-31 ENCOUNTER — ANESTHESIA (OUTPATIENT)
Dept: OPERATING ROOM | Age: 83
DRG: 522 | End: 2023-10-31
Payer: MEDICARE

## 2023-10-31 ENCOUNTER — ANESTHESIA EVENT (OUTPATIENT)
Dept: OPERATING ROOM | Age: 83
DRG: 522 | End: 2023-10-31
Payer: MEDICARE

## 2023-10-31 LAB
ANION GAP SERPL CALCULATED.3IONS-SCNC: 10 MMOL/L (ref 7–16)
BASOPHILS # BLD: 0 K/UL (ref 0–0.2)
BASOPHILS NFR BLD: 0 % (ref 0–2)
BNP SERPL-MCNC: 7989 PG/ML (ref 0–450)
BUN SERPL-MCNC: 19 MG/DL (ref 6–23)
CALCIUM SERPL-MCNC: 8.4 MG/DL (ref 8.6–10.2)
CHLORIDE SERPL-SCNC: 101 MMOL/L (ref 98–107)
CO2 SERPL-SCNC: 23 MMOL/L (ref 22–29)
CREAT SERPL-MCNC: 0.8 MG/DL (ref 0.5–1)
EOSINOPHIL # BLD: 0.17 K/UL (ref 0.05–0.5)
EOSINOPHILS RELATIVE PERCENT: 2 % (ref 0–6)
ERYTHROCYTE [DISTWIDTH] IN BLOOD BY AUTOMATED COUNT: 15.7 % (ref 11.5–15)
GFR SERPL CREATININE-BSD FRML MDRD: >60 ML/MIN/1.73M2
GLUCOSE SERPL-MCNC: 107 MG/DL (ref 74–99)
HCT VFR BLD AUTO: 37.4 % (ref 34–48)
HGB BLD-MCNC: 11.9 G/DL (ref 11.5–15.5)
LYMPHOCYTES NFR BLD: 0.58 K/UL (ref 1.5–4)
LYMPHOCYTES RELATIVE PERCENT: 6 % (ref 20–42)
MAGNESIUM SERPL-MCNC: 2.3 MG/DL (ref 1.6–2.6)
MCH RBC QN AUTO: 27.9 PG (ref 26–35)
MCHC RBC AUTO-ENTMCNC: 31.8 G/DL (ref 32–34.5)
MCV RBC AUTO: 87.6 FL (ref 80–99.9)
MONOCYTES NFR BLD: 0.08 K/UL (ref 0.1–0.95)
MONOCYTES NFR BLD: 1 % (ref 2–12)
NEUTROPHILS NFR BLD: 91 % (ref 43–80)
NEUTS SEG NFR BLD: 8.77 K/UL (ref 1.8–7.3)
PLATELET # BLD AUTO: 143 K/UL (ref 130–450)
PMV BLD AUTO: 9.7 FL (ref 7–12)
POTASSIUM SERPL-SCNC: 3.3 MMOL/L (ref 3.5–5)
RBC # BLD AUTO: 4.27 M/UL (ref 3.5–5.5)
RBC # BLD: ABNORMAL 10*6/UL
SODIUM SERPL-SCNC: 134 MMOL/L (ref 132–146)
WBC OTHER # BLD: 9.6 K/UL (ref 4.5–11.5)

## 2023-10-31 PROCEDURE — 6360000002 HC RX W HCPCS: Performed by: FAMILY MEDICINE

## 2023-10-31 PROCEDURE — 2580000003 HC RX 258: Performed by: NURSE PRACTITIONER

## 2023-10-31 PROCEDURE — 1200000000 HC SEMI PRIVATE

## 2023-10-31 PROCEDURE — 2709999900 HC NON-CHARGEABLE SUPPLY: Performed by: STUDENT IN AN ORGANIZED HEALTH CARE EDUCATION/TRAINING PROGRAM

## 2023-10-31 PROCEDURE — 73502 X-RAY EXAM HIP UNI 2-3 VIEWS: CPT

## 2023-10-31 PROCEDURE — 6360000002 HC RX W HCPCS: Performed by: NURSE PRACTITIONER

## 2023-10-31 PROCEDURE — 3700000000 HC ANESTHESIA ATTENDED CARE: Performed by: STUDENT IN AN ORGANIZED HEALTH CARE EDUCATION/TRAINING PROGRAM

## 2023-10-31 PROCEDURE — 83880 ASSAY OF NATRIURETIC PEPTIDE: CPT

## 2023-10-31 PROCEDURE — 6370000000 HC RX 637 (ALT 250 FOR IP): Performed by: ANESTHESIOLOGY

## 2023-10-31 PROCEDURE — 7100000001 HC PACU RECOVERY - ADDTL 15 MIN: Performed by: STUDENT IN AN ORGANIZED HEALTH CARE EDUCATION/TRAINING PROGRAM

## 2023-10-31 PROCEDURE — 83735 ASSAY OF MAGNESIUM: CPT

## 2023-10-31 PROCEDURE — 6360000002 HC RX W HCPCS

## 2023-10-31 PROCEDURE — 2580000003 HC RX 258

## 2023-10-31 PROCEDURE — 2580000003 HC RX 258: Performed by: FAMILY MEDICINE

## 2023-10-31 PROCEDURE — 2500000003 HC RX 250 WO HCPCS: Performed by: STUDENT IN AN ORGANIZED HEALTH CARE EDUCATION/TRAINING PROGRAM

## 2023-10-31 PROCEDURE — 88305 TISSUE EXAM BY PATHOLOGIST: CPT

## 2023-10-31 PROCEDURE — 94664 DEMO&/EVAL PT USE INHALER: CPT

## 2023-10-31 PROCEDURE — A4217 STERILE WATER/SALINE, 500 ML: HCPCS | Performed by: STUDENT IN AN ORGANIZED HEALTH CARE EDUCATION/TRAINING PROGRAM

## 2023-10-31 PROCEDURE — 88311 DECALCIFY TISSUE: CPT

## 2023-10-31 PROCEDURE — 85025 COMPLETE CBC W/AUTO DIFF WBC: CPT

## 2023-10-31 PROCEDURE — 3700000001 HC ADD 15 MINUTES (ANESTHESIA): Performed by: STUDENT IN AN ORGANIZED HEALTH CARE EDUCATION/TRAINING PROGRAM

## 2023-10-31 PROCEDURE — 71045 X-RAY EXAM CHEST 1 VIEW: CPT

## 2023-10-31 PROCEDURE — 6370000000 HC RX 637 (ALT 250 FOR IP): Performed by: NURSE PRACTITIONER

## 2023-10-31 PROCEDURE — 2700000000 HC OXYGEN THERAPY PER DAY

## 2023-10-31 PROCEDURE — 7100000000 HC PACU RECOVERY - FIRST 15 MIN: Performed by: STUDENT IN AN ORGANIZED HEALTH CARE EDUCATION/TRAINING PROGRAM

## 2023-10-31 PROCEDURE — 2580000003 HC RX 258: Performed by: STUDENT IN AN ORGANIZED HEALTH CARE EDUCATION/TRAINING PROGRAM

## 2023-10-31 PROCEDURE — 6370000000 HC RX 637 (ALT 250 FOR IP): Performed by: FAMILY MEDICINE

## 2023-10-31 PROCEDURE — 27236 TREAT THIGH FRACTURE: CPT | Performed by: STUDENT IN AN ORGANIZED HEALTH CARE EDUCATION/TRAINING PROGRAM

## 2023-10-31 PROCEDURE — C1776 JOINT DEVICE (IMPLANTABLE): HCPCS | Performed by: STUDENT IN AN ORGANIZED HEALTH CARE EDUCATION/TRAINING PROGRAM

## 2023-10-31 PROCEDURE — 6360000002 HC RX W HCPCS: Performed by: STUDENT IN AN ORGANIZED HEALTH CARE EDUCATION/TRAINING PROGRAM

## 2023-10-31 PROCEDURE — 2500000003 HC RX 250 WO HCPCS

## 2023-10-31 PROCEDURE — 80048 BASIC METABOLIC PNL TOTAL CA: CPT

## 2023-10-31 PROCEDURE — 6360000002 HC RX W HCPCS: Performed by: INTERNAL MEDICINE

## 2023-10-31 PROCEDURE — 0SRS0JZ REPLACEMENT OF LEFT HIP JOINT, FEMORAL SURFACE WITH SYNTHETIC SUBSTITUTE, OPEN APPROACH: ICD-10-PCS | Performed by: STUDENT IN AN ORGANIZED HEALTH CARE EDUCATION/TRAINING PROGRAM

## 2023-10-31 PROCEDURE — 36415 COLL VENOUS BLD VENIPUNCTURE: CPT

## 2023-10-31 PROCEDURE — 3600000016 HC SURGERY LEVEL 6 ADDTL 15MIN: Performed by: STUDENT IN AN ORGANIZED HEALTH CARE EDUCATION/TRAINING PROGRAM

## 2023-10-31 PROCEDURE — 3600000006 HC SURGERY LEVEL 6 BASE: Performed by: STUDENT IN AN ORGANIZED HEALTH CARE EDUCATION/TRAINING PROGRAM

## 2023-10-31 DEVICE — BIPOLAR COMPONENT
Type: IMPLANTABLE DEVICE | Site: HIP | Status: FUNCTIONAL
Brand: UHR

## 2023-10-31 DEVICE — 127 DEGREE NECK ANGLE HIP STEM
Type: IMPLANTABLE DEVICE | Site: HIP | Status: FUNCTIONAL
Brand: ACCOLADE

## 2023-10-31 DEVICE — LFIT V40 FEMORAL HEAD
Type: IMPLANTABLE DEVICE | Site: HIP | Status: FUNCTIONAL
Brand: V40 HEAD

## 2023-10-31 RX ORDER — FUROSEMIDE 40 MG/1
40 TABLET ORAL DAILY
Status: DISCONTINUED | OUTPATIENT
Start: 2023-11-01 | End: 2023-11-02 | Stop reason: HOSPADM

## 2023-10-31 RX ORDER — IPRATROPIUM BROMIDE AND ALBUTEROL SULFATE 2.5; .5 MG/3ML; MG/3ML
1 SOLUTION RESPIRATORY (INHALATION) ONCE
Status: COMPLETED | OUTPATIENT
Start: 2023-10-31 | End: 2023-10-31

## 2023-10-31 RX ORDER — ACETAMINOPHEN 650 MG/1
650 SUPPOSITORY RECTAL EVERY 6 HOURS PRN
Status: DISCONTINUED | OUTPATIENT
Start: 2023-10-31 | End: 2023-11-02 | Stop reason: HOSPADM

## 2023-10-31 RX ORDER — ONDANSETRON 4 MG/1
4 TABLET, ORALLY DISINTEGRATING ORAL EVERY 8 HOURS PRN
Status: DISCONTINUED | OUTPATIENT
Start: 2023-10-31 | End: 2023-11-02 | Stop reason: HOSPADM

## 2023-10-31 RX ORDER — ONDANSETRON 2 MG/ML
4 INJECTION INTRAMUSCULAR; INTRAVENOUS EVERY 6 HOURS PRN
Status: DISCONTINUED | OUTPATIENT
Start: 2023-10-31 | End: 2023-11-02 | Stop reason: HOSPADM

## 2023-10-31 RX ORDER — METOPROLOL SUCCINATE 50 MG/1
50 TABLET, EXTENDED RELEASE ORAL 2 TIMES DAILY
Status: ON HOLD | COMMUNITY
Start: 2023-08-04

## 2023-10-31 RX ORDER — ROCURONIUM BROMIDE 10 MG/ML
INJECTION, SOLUTION INTRAVENOUS PRN
Status: DISCONTINUED | OUTPATIENT
Start: 2023-10-31 | End: 2023-10-31 | Stop reason: SDUPTHER

## 2023-10-31 RX ORDER — SODIUM CHLORIDE 9 MG/ML
INJECTION, SOLUTION INTRAVENOUS PRN
Status: DISCONTINUED | OUTPATIENT
Start: 2023-10-31 | End: 2023-11-02 | Stop reason: HOSPADM

## 2023-10-31 RX ORDER — LISINOPRIL 20 MG/1
20 TABLET ORAL DAILY
Status: DISCONTINUED | OUTPATIENT
Start: 2023-10-31 | End: 2023-11-02 | Stop reason: HOSPADM

## 2023-10-31 RX ORDER — ACETAMINOPHEN 325 MG/1
650 TABLET ORAL EVERY 6 HOURS PRN
Status: DISCONTINUED | OUTPATIENT
Start: 2023-10-31 | End: 2023-11-02 | Stop reason: HOSPADM

## 2023-10-31 RX ORDER — IPRATROPIUM BROMIDE AND ALBUTEROL SULFATE 2.5; .5 MG/3ML; MG/3ML
1 SOLUTION RESPIRATORY (INHALATION)
Status: DISCONTINUED | OUTPATIENT
Start: 2023-10-31 | End: 2023-11-02 | Stop reason: HOSPADM

## 2023-10-31 RX ORDER — DEXAMETHASONE SODIUM PHOSPHATE 10 MG/ML
INJECTION INTRAMUSCULAR; INTRAVENOUS PRN
Status: DISCONTINUED | OUTPATIENT
Start: 2023-10-31 | End: 2023-10-31 | Stop reason: SDUPTHER

## 2023-10-31 RX ORDER — SODIUM CHLORIDE 0.9 % (FLUSH) 0.9 %
5-40 SYRINGE (ML) INJECTION EVERY 12 HOURS SCHEDULED
Status: DISCONTINUED | OUTPATIENT
Start: 2023-10-31 | End: 2023-11-02 | Stop reason: HOSPADM

## 2023-10-31 RX ORDER — OXYCODONE HYDROCHLORIDE AND ACETAMINOPHEN 5; 325 MG/1; MG/1
1 TABLET ORAL EVERY 6 HOURS PRN
Qty: 28 TABLET | Refills: 0 | Status: SHIPPED | OUTPATIENT
Start: 2023-10-31 | End: 2023-11-07

## 2023-10-31 RX ORDER — PROPOFOL 10 MG/ML
INJECTION, EMULSION INTRAVENOUS PRN
Status: DISCONTINUED | OUTPATIENT
Start: 2023-10-31 | End: 2023-10-31 | Stop reason: SDUPTHER

## 2023-10-31 RX ORDER — FUROSEMIDE 10 MG/ML
20 INJECTION INTRAMUSCULAR; INTRAVENOUS ONCE
Status: COMPLETED | OUTPATIENT
Start: 2023-10-31 | End: 2023-10-31

## 2023-10-31 RX ORDER — AMLODIPINE BESYLATE 5 MG/1
5 TABLET ORAL NIGHTLY
Status: DISCONTINUED | OUTPATIENT
Start: 2023-10-31 | End: 2023-11-02 | Stop reason: HOSPADM

## 2023-10-31 RX ORDER — ATORVASTATIN CALCIUM 40 MG/1
40 TABLET, FILM COATED ORAL NIGHTLY
Status: DISCONTINUED | OUTPATIENT
Start: 2023-10-31 | End: 2023-11-02 | Stop reason: HOSPADM

## 2023-10-31 RX ORDER — FENTANYL CITRATE 50 UG/ML
INJECTION, SOLUTION INTRAMUSCULAR; INTRAVENOUS PRN
Status: DISCONTINUED | OUTPATIENT
Start: 2023-10-31 | End: 2023-10-31 | Stop reason: SDUPTHER

## 2023-10-31 RX ORDER — SODIUM CHLORIDE 0.9 % (FLUSH) 0.9 %
5-40 SYRINGE (ML) INJECTION PRN
Status: DISCONTINUED | OUTPATIENT
Start: 2023-10-31 | End: 2023-11-02 | Stop reason: HOSPADM

## 2023-10-31 RX ORDER — POLYETHYLENE GLYCOL 3350 17 G/17G
17 POWDER, FOR SOLUTION ORAL DAILY PRN
Status: DISCONTINUED | OUTPATIENT
Start: 2023-10-31 | End: 2023-11-02 | Stop reason: HOSPADM

## 2023-10-31 RX ORDER — PHENYLEPHRINE HCL IN 0.9% NACL 1 MG/10 ML
SYRINGE (ML) INTRAVENOUS PRN
Status: DISCONTINUED | OUTPATIENT
Start: 2023-10-31 | End: 2023-10-31 | Stop reason: SDUPTHER

## 2023-10-31 RX ORDER — SODIUM CHLORIDE 0.9 % (FLUSH) 0.9 %
10 SYRINGE (ML) INJECTION PRN
Status: DISCONTINUED | OUTPATIENT
Start: 2023-10-31 | End: 2023-11-02 | Stop reason: HOSPADM

## 2023-10-31 RX ORDER — METOPROLOL SUCCINATE 50 MG/1
50 TABLET, EXTENDED RELEASE ORAL 2 TIMES DAILY
Status: DISCONTINUED | OUTPATIENT
Start: 2023-10-31 | End: 2023-11-02 | Stop reason: HOSPADM

## 2023-10-31 RX ORDER — APIXABAN 2.5 MG/1
2.5 TABLET, FILM COATED ORAL 2 TIMES DAILY
Status: ON HOLD | COMMUNITY
Start: 2023-10-23

## 2023-10-31 RX ORDER — LIDOCAINE HYDROCHLORIDE 20 MG/ML
INJECTION, SOLUTION INTRAVENOUS PRN
Status: DISCONTINUED | OUTPATIENT
Start: 2023-10-31 | End: 2023-10-31 | Stop reason: SDUPTHER

## 2023-10-31 RX ADMIN — SUGAMMADEX 200 MG: 100 INJECTION, SOLUTION INTRAVENOUS at 14:00

## 2023-10-31 RX ADMIN — METOPROLOL SUCCINATE 50 MG: 50 TABLET, EXTENDED RELEASE ORAL at 10:55

## 2023-10-31 RX ADMIN — SODIUM CHLORIDE: 9 INJECTION, SOLUTION INTRAVENOUS at 12:52

## 2023-10-31 RX ADMIN — Medication 10 ML: at 23:35

## 2023-10-31 RX ADMIN — MORPHINE SULFATE 2 MG: 2 INJECTION, SOLUTION INTRAMUSCULAR; INTRAVENOUS at 02:36

## 2023-10-31 RX ADMIN — ROCURONIUM BROMIDE 30 MG: 10 INJECTION, SOLUTION INTRAVENOUS at 13:00

## 2023-10-31 RX ADMIN — FUROSEMIDE 20 MG: 10 INJECTION, SOLUTION INTRAMUSCULAR; INTRAVENOUS at 18:18

## 2023-10-31 RX ADMIN — ONDANSETRON HYDROCHLORIDE 4 MG: 2 INJECTION, SOLUTION INTRAMUSCULAR; INTRAVENOUS at 14:11

## 2023-10-31 RX ADMIN — LIDOCAINE HYDROCHLORIDE 60 MG: 20 INJECTION, SOLUTION INTRAVENOUS at 13:00

## 2023-10-31 RX ADMIN — SODIUM CHLORIDE, PRESERVATIVE FREE 10 ML: 5 INJECTION INTRAVENOUS at 10:56

## 2023-10-31 RX ADMIN — WATER 2000 MG: 1 INJECTION INTRAMUSCULAR; INTRAVENOUS; SUBCUTANEOUS at 21:13

## 2023-10-31 RX ADMIN — AMLODIPINE BESYLATE 5 MG: 5 TABLET ORAL at 21:14

## 2023-10-31 RX ADMIN — FENTANYL CITRATE 50 MCG: 50 INJECTION, SOLUTION INTRAMUSCULAR; INTRAVENOUS at 13:00

## 2023-10-31 RX ADMIN — Medication 200 MCG: at 13:10

## 2023-10-31 RX ADMIN — CEFAZOLIN 2000 MG: 2 INJECTION, POWDER, FOR SOLUTION INTRAMUSCULAR; INTRAVENOUS at 12:55

## 2023-10-31 RX ADMIN — ATORVASTATIN CALCIUM 40 MG: 40 TABLET, FILM COATED ORAL at 21:14

## 2023-10-31 RX ADMIN — DEXAMETHASONE SODIUM PHOSPHATE 10 MG: 10 INJECTION INTRAMUSCULAR; INTRAVENOUS at 13:18

## 2023-10-31 RX ADMIN — IPRATROPIUM BROMIDE AND ALBUTEROL SULFATE 1 DOSE: 2.5; .5 SOLUTION RESPIRATORY (INHALATION) at 15:41

## 2023-10-31 RX ADMIN — PROPOFOL 80 MG: 10 INJECTION, EMULSION INTRAVENOUS at 13:00

## 2023-10-31 RX ADMIN — SODIUM CHLORIDE, PRESERVATIVE FREE 10 ML: 5 INJECTION INTRAVENOUS at 18:19

## 2023-10-31 RX ADMIN — METOPROLOL SUCCINATE 50 MG: 50 TABLET, EXTENDED RELEASE ORAL at 21:14

## 2023-10-31 RX ADMIN — MORPHINE SULFATE 2 MG: 2 INJECTION, SOLUTION INTRAMUSCULAR; INTRAVENOUS at 21:13

## 2023-10-31 RX ADMIN — AMLODIPINE BESYLATE 5 MG: 5 TABLET ORAL at 03:02

## 2023-10-31 RX ADMIN — LISINOPRIL 20 MG: 20 TABLET ORAL at 10:56

## 2023-10-31 RX ADMIN — TRANEXAMIC ACID 1 G: 1 INJECTION, SOLUTION INTRAVENOUS at 13:10

## 2023-10-31 ASSESSMENT — PAIN - FUNCTIONAL ASSESSMENT
PAIN_FUNCTIONAL_ASSESSMENT: PREVENTS OR INTERFERES SOME ACTIVE ACTIVITIES AND ADLS
PAIN_FUNCTIONAL_ASSESSMENT: PREVENTS OR INTERFERES SOME ACTIVE ACTIVITIES AND ADLS

## 2023-10-31 ASSESSMENT — PAIN SCALES - GENERAL
PAINLEVEL_OUTOF10: 0
PAINLEVEL_OUTOF10: 0
PAINLEVEL_OUTOF10: 3
PAINLEVEL_OUTOF10: 9
PAINLEVEL_OUTOF10: 0
PAINLEVEL_OUTOF10: 9
PAINLEVEL_OUTOF10: 0
PAINLEVEL_OUTOF10: 7
PAINLEVEL_OUTOF10: 0

## 2023-10-31 ASSESSMENT — PAIN DESCRIPTION - ORIENTATION
ORIENTATION: LEFT

## 2023-10-31 ASSESSMENT — PAIN DESCRIPTION - DESCRIPTORS
DESCRIPTORS: ACHING

## 2023-10-31 ASSESSMENT — PAIN DESCRIPTION - ONSET
ONSET: ON-GOING
ONSET: ON-GOING

## 2023-10-31 ASSESSMENT — PAIN DESCRIPTION - LOCATION
LOCATION: HIP

## 2023-10-31 ASSESSMENT — PAIN DESCRIPTION - FREQUENCY
FREQUENCY: CONTINUOUS
FREQUENCY: INTERMITTENT

## 2023-10-31 ASSESSMENT — PAIN DESCRIPTION - PAIN TYPE
TYPE: ACUTE PAIN
TYPE: SURGICAL PAIN

## 2023-10-31 NOTE — BRIEF OP NOTE
Brief Postoperative Note      Patient: Lindsey Rose  YOB: 1940  MRN: [id number]    Date of Procedure: 10/31/2023    Pre-Op Diagnosis Codes:  Closed left femoral neck fracture  Post-Op Diagnosis: Same       Procedure(s):  left HIP HEMIARTHROPLASTY    Surgeon(s):  Dagoberto Roberson DO    Assistant:  * No surgical staff found *    Anesthesia: General    Estimated Blood Loss (mL): 105     Complications: None    Specimens:   ID Type Source Tests Collected by Time Destination   A : left femoral head Tissue Tissue SURGICAL PATHOLOGY Dagoberto Roberson DO 10/31/2023 1401        Implants:  Implant Name Type Inv.  Item Serial No.  Lot No. LRB No. Used Action   HEAD FEM OD44MM ID26MM HIP CO CHROM POLYETH BPLR CEMENTLESS - [id number]  HEAD FEM OD44MM ID26MM HIP CO CHROM POLYETH BPLR CEMENTLESS  [id number] [place] [id number] Left 1 Implanted   HEAD FEM [id number] -3MM OFFSET HIP CO CHROM POLYETH TAPR LO - [id number]  HEAD FEM [id number] -3MM OFFSET HIP CO CHROM POLYETH TAPR LO  [employer] [facility] [id number] Left 1 Implanted   STEM FEM SZ 6 L111MM NK L35MM 45MM OFFSET 127DEG HIP TI - [id number]  STEM FEM SZ 6 L111MM NK L35MM 45MM OFFSET 127DEG HIP TI  [employer] [facility] [id number] Left 1 Implanted         Drains:   External Urinary Catheter (Active)       Findings: Left hip hemiarthroplasty      Electronically signed by Noel Vivar DO on 10/31/2023 at 2:02 PM

## 2023-10-31 NOTE — PROGRESS NOTES
Patient in the immediate post-operative period. Ok to resume DVT prophylaxis from orthopedic standpoint. DVT prophylaxis being managed by medicine, will defer to them for both inpatient and outpatient DVT prophylaxis. Orthopedic recommendation is continuation of her home Eliquis patient should be on at least 30 days of DVT prophylaxis after discharge.     Electronically signed by Luther Deluca DO on 10/31/2023 at 2:27 PM

## 2023-10-31 NOTE — ANESTHESIA PRE PROCEDURE
not put anything in patients left nare. Had a recent nose bleed  Mouth opening: > = 3 FB   Dental:    (+) partials  Comment: Upper and lower partial pt also has crowns    Pulmonary:normal exam              Patient did not smoke on day of surgery. ROS comment: Hx smoking quit 40 years ago smoked about 25 years   Cardiovascular:    (+) hypertension:, dysrhythmias: atrial fibrillation,         Rhythm: irregular  Rate: abnormal    Stress test reviewed                Neuro/Psych:   (+) CVA (short term memory loss):,              ROS comment: Numbness yulisa fingers GI/Hepatic/Renal:            ROS comment: Pt complains of nausea after her stroke that is continued. 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                 Abdominal:       Abdomen: soft. Vascular:           ROS comment: Pt had recent nosebleed seen by Dr Kt Wilks ok now. Pt also has phlebitis in left leg occasional swelling. Other Findings:             Anesthesia Plan      general     ASA 3       Induction: intravenous. arterial line  MIPS: Postoperative opioids intended and Prophylactic antiemetics administered. Anesthetic plan and risks discussed with patient and child/children. Use of blood products discussed with patient and child/children whom consented to blood products. Plan discussed with attending. DOS STAFF ADDENDUM:    Patient seen and chart reviewed. Physical exam and history updated as indicated. NPO status confirmed. Anesthesia options and plan discussed including risks benefits with patient/legal guardian and family as available. Concerns and questions addressed. Consent verbalized to proceed.   Anesthesia plan, options and intraoperative/postoperative concerns discussed with care team.    Fahad Peter MD, MD  10/31/2023  12:29 PM          Fahad Peter MD   10/31/2023

## 2023-10-31 NOTE — PROGRESS NOTES
Not able to get any information for purposes of admission from the patient she seems to be in asleep state easily aroused but get back to sleep within seconds .

## 2023-10-31 NOTE — PROGRESS NOTES
Physical Therapy    Date: 10/31/2023       Patient Name: Bravo Cazares  : 1940      MRN: [id number]    PT order received. Chart has been reviewed. PT evaluation will be on hold due to planned OR this date 10/31/23. Will continue to follow and complete evaluation at later time.      Kusum Sunshine, PT

## 2023-10-31 NOTE — PROGRESS NOTES
4 Eyes Skin Assessment     NAME:  Leslie Silva  YOB: 1940  MEDICAL RECORD NUMBER:  [id number]    The patient is being assessed for  Admission    I agree that at least one RN has performed a thorough Head to Toe Skin Assessment on the patient. ALL assessment sites listed below have been assessed. Areas assessed by both nurses:    Head, Face, Ears, Shoulders, Back, Chest, Arms, Elbows, Hands, Sacrum. Buttock, Coccyx, Ischium, Legs. Feet and Heels, and Under Medical Devices         Does the Patient have a Wound?  No noted wound(s)       Chung Prevention initiated by RN: Yes  Wound Care Orders initiated by RN: Yes    Pressure Injury (Stage 3,4, Unstageable, DTI, NWPT, and Complex wounds) if present, place Wound referral order by RN under : No    New Ostomies, if present place, Ostomy referral order under : No     Nurse 1 eSignature: Electronically signed by Kelly Dumas RN on 10/31/23 at 3:09 AM EDT    **SHARE this note so that the co-signing nurse can place an eSignature**    Nurse 2 eSignature: Electronically signed by Jose Callaway RN on 10/31/23 at 3:46 AM EDT

## 2023-10-31 NOTE — PROGRESS NOTES
Occupational Therapy    Date:10/31/2023  Patient Name: Ami Current  MRN: [id number]  : 1940  Room: [address] [place] [address]-A     OT orders received and chart reviewed. OT eval on hold at this time pending tentative plan for OR this data. OT will follow and re-attempt eval as appropriate, pending ortho POC, at a later time/date.     Francesco Brain OTR/L; [id number]

## 2023-10-31 NOTE — ED NOTES
Report called to RN on 5WE. Patient resting comfortably in bed with no signs of distress.      Anu Candelario RN  10/30/23 6688

## 2023-10-31 NOTE — OP NOTE
Operative Note      Patient: Ricardo Nava  YOB: 1940  MRN: [id number]    Date of Procedure: 10/31/2023    Pre-Op Diagnosis Codes:  Closed left femoral neck fracture    Post-Op Diagnosis: Same       Procedure(s):  left HIP HEMIARTHROPLASTY    Surgeon(s):  Mona Her DO    Assistant:   * No surgical staff found *    Anesthesia: General    Estimated Blood Loss (mL): 216     Complications: None    Specimens:   ID Type Source Tests Collected by Time Destination   A : left femoral head Tissue Tissue SURGICAL PATHOLOGY Mona Her DO 10/31/2023 1401    B : left femoral head Tissue Tissue SURGICAL PATHOLOGY Mona Her,  10/31/2023 1402        Implants:  Implant Name Type Inv. Item Serial No.  Lot No. LRB No. Used Action   HEAD FEM OD44MM ID26MM HIP CO CHROM POLYETH BPLR CEMENTLESS - [id number]  HEAD FEM OD44MM ID26MM HIP CO CHROM POLYETH BPLR CEMENTLESS  [employer] [facility] [id number] Left 1 Implanted   HEAD FEM [id number] -3MM OFFSET HIP CO CHROM POLYETH TAPR LO - [id number]  HEAD FEM [id number] -3MM OFFSET HIP CO CHROM POLYETH TAPR LO  [employer] [facility] [id number] Left 1 Implanted   STEM FEM SZ 6 L111MM NK L35MM 45MM OFFSET 127DEG HIP TI - [id number]  STEM FEM SZ 6 L111MM NK L35MM 45MM OFFSET 127DEG HIP TI  [employer] [facility] [id number] Left 1 Implanted         Drains:   External Urinary Catheter (Active)       Findings: See operative report below        Detailed Description of Procedure: This is a 57-year-old female who sustained a ground-level fall suffering a displaced left femoral neck fracture. This is an operative fracture for early mobilization and weightbearing. Plan is hemiarthroplasty. Risk benefits alternative surgery discussed in detail with the patient and her family.   They understand the risk of blood loss, blood clot, infection, damage surrounding neurovascular structures, arthrofibrosis, instability, limb length inequality, periprosthetic Soft tissue tension was appropriate and the hip was stable in all planes. Limb lengths appeared equal on her side. Trial implants were carefully dislocated from the acetabulum and the femoral neck was exposed. Trials were removed and we impacted a Wyano Accolade 2 size 6 stem to the same position. We then selected a final size 44 bipolar head with a -4 neck. This was impacted onto our trunnion. This was reduced in the acetabulum. Hip was taken through range of motion and found to be stable in all planes. Soft tissue tension was appropriate. Limits appeared equal on her side. The hip was copiously irrigated with sterile saline. Meticulous hemostasis was achieved. Tobramycin was placed within the incision. Capsule was closed using #1 Ethibond suture. Gluteus medius and minimus tendons were repaired back to the greater trochanter through bone tunnels using #5 Ethibond suture. IT band was closed in a watertight fashion using #5 Ethibond running #1 strata fix. Remainder of the wound was closed using 0 Vicryl, 2-0 Monocryl, staples. Sterile dressing was applied. Patient was awakened anesthesia transferred PACU stable condition. Return to the floor weightbearing as tolerated with anterolateral hip cautions. She will receive postop antibiotics, DVT prophylaxis in the form of Eliquis, and pain control. She will likely need rehab placement. She can follow-up in the office in 2 weeks.   Should    Electronically signed by Tomás Sellers DO on 10/31/2023 at 2:02 PM

## 2023-10-31 NOTE — CARE COORDINATION
10/31. The pt lives with her daughter. She is for Left hip surgery after falling at home. Will need PT/OT after surgery to assist with discharge planning.  Grant Masters RN

## 2023-10-31 NOTE — ED NOTES
Attempted to call patient's daughter to obtain procedure consent, no answer. Will attempt to call again soon.      Shelli Yoder RN  10/30/23 2049

## 2023-10-31 NOTE — PLAN OF CARE
Problem: Chronic Conditions and Co-morbidities  Goal: Patient's chronic conditions and co-morbidity symptoms are monitored and maintained or improved  Outcome: Progressing  Flowsheets (Taken 10/31/2023 0215)  Care Plan - Patient's Chronic Conditions and Co-Morbidity Symptoms are Monitored and Maintained or Improved: Monitor and assess patient's chronic conditions and comorbid symptoms for stability, deterioration, or improvement     Problem: Discharge Planning  Goal: Discharge to home or other facility with appropriate resources  Outcome: Progressing  Flowsheets (Taken 10/31/2023 0215)  Discharge to home or other facility with appropriate resources: Identify barriers to discharge with patient and caregiver     Problem: Pain  Goal: Verbalizes/displays adequate comfort level or baseline comfort level  Outcome: Progressing  Flowsheets (Taken 10/31/2023 0215)  Verbalizes/displays adequate comfort level or baseline comfort level: Encourage patient to monitor pain and request assistance None

## 2023-10-31 NOTE — PROGRESS NOTES
elevated at greater than 7000  -Continue IV Lasix after today's dose of 40x1  -Continue respiratory treatments    Code status: Full  Consultants: Ortho    DVT Prophylaxis   PT/OT  Discharge David Mukherjee MD

## 2023-11-01 LAB
ANION GAP SERPL CALCULATED.3IONS-SCNC: 11 MMOL/L (ref 7–16)
BASOPHILS # BLD: 0.02 K/UL (ref 0–0.2)
BASOPHILS NFR BLD: 0 % (ref 0–2)
BUN SERPL-MCNC: 26 MG/DL (ref 6–23)
CALCIUM SERPL-MCNC: 8.1 MG/DL (ref 8.6–10.2)
CHLORIDE SERPL-SCNC: 104 MMOL/L (ref 98–107)
CO2 SERPL-SCNC: 22 MMOL/L (ref 22–29)
CREAT SERPL-MCNC: 1 MG/DL (ref 0.5–1)
EOSINOPHIL # BLD: 0.01 K/UL (ref 0.05–0.5)
EOSINOPHILS RELATIVE PERCENT: 0 % (ref 0–6)
ERYTHROCYTE [DISTWIDTH] IN BLOOD BY AUTOMATED COUNT: 15.3 % (ref 11.5–15)
GFR SERPL CREATININE-BSD FRML MDRD: 58 ML/MIN/1.73M2
GLUCOSE SERPL-MCNC: 118 MG/DL (ref 74–99)
HCT VFR BLD AUTO: 34.1 % (ref 34–48)
HGB BLD-MCNC: 11 G/DL (ref 11.5–15.5)
IMM GRANULOCYTES # BLD AUTO: 0.04 K/UL (ref 0–0.58)
IMM GRANULOCYTES NFR BLD: 1 % (ref 0–5)
LYMPHOCYTES NFR BLD: 0.31 K/UL (ref 1.5–4)
LYMPHOCYTES RELATIVE PERCENT: 4 % (ref 20–42)
MCH RBC QN AUTO: 28.3 PG (ref 26–35)
MCHC RBC AUTO-ENTMCNC: 32.3 G/DL (ref 32–34.5)
MCV RBC AUTO: 87.7 FL (ref 80–99.9)
MONOCYTES NFR BLD: 0.4 K/UL (ref 0.1–0.95)
MONOCYTES NFR BLD: 5 % (ref 2–12)
NEUTROPHILS NFR BLD: 90 % (ref 43–80)
NEUTS SEG NFR BLD: 7.3 K/UL (ref 1.8–7.3)
PLATELET # BLD AUTO: 126 K/UL (ref 130–450)
PMV BLD AUTO: 10 FL (ref 7–12)
POTASSIUM SERPL-SCNC: 3.6 MMOL/L (ref 3.5–5)
RBC # BLD AUTO: 3.89 M/UL (ref 3.5–5.5)
RBC # BLD: ABNORMAL 10*6/UL
SODIUM SERPL-SCNC: 137 MMOL/L (ref 132–146)
WBC OTHER # BLD: 8.1 K/UL (ref 4.5–11.5)

## 2023-11-01 PROCEDURE — 97530 THERAPEUTIC ACTIVITIES: CPT

## 2023-11-01 PROCEDURE — 6370000000 HC RX 637 (ALT 250 FOR IP): Performed by: INTERNAL MEDICINE

## 2023-11-01 PROCEDURE — 6370000000 HC RX 637 (ALT 250 FOR IP): Performed by: FAMILY MEDICINE

## 2023-11-01 PROCEDURE — 2700000000 HC OXYGEN THERAPY PER DAY

## 2023-11-01 PROCEDURE — 97161 PT EVAL LOW COMPLEX 20 MIN: CPT

## 2023-11-01 PROCEDURE — 97165 OT EVAL LOW COMPLEX 30 MIN: CPT

## 2023-11-01 PROCEDURE — 2580000003 HC RX 258: Performed by: FAMILY MEDICINE

## 2023-11-01 PROCEDURE — 94640 AIRWAY INHALATION TREATMENT: CPT

## 2023-11-01 PROCEDURE — 36415 COLL VENOUS BLD VENIPUNCTURE: CPT

## 2023-11-01 PROCEDURE — 85025 COMPLETE CBC W/AUTO DIFF WBC: CPT

## 2023-11-01 PROCEDURE — 6360000002 HC RX W HCPCS: Performed by: FAMILY MEDICINE

## 2023-11-01 PROCEDURE — 80048 BASIC METABOLIC PNL TOTAL CA: CPT

## 2023-11-01 PROCEDURE — 1200000000 HC SEMI PRIVATE

## 2023-11-01 RX ADMIN — ATORVASTATIN CALCIUM 40 MG: 40 TABLET, FILM COATED ORAL at 21:26

## 2023-11-01 RX ADMIN — AMLODIPINE BESYLATE 5 MG: 5 TABLET ORAL at 21:23

## 2023-11-01 RX ADMIN — METOPROLOL SUCCINATE 50 MG: 50 TABLET, EXTENDED RELEASE ORAL at 21:23

## 2023-11-01 RX ADMIN — MORPHINE SULFATE 2 MG: 2 INJECTION, SOLUTION INTRAMUSCULAR; INTRAVENOUS at 21:22

## 2023-11-01 RX ADMIN — SODIUM CHLORIDE, PRESERVATIVE FREE 10 ML: 5 INJECTION INTRAVENOUS at 21:26

## 2023-11-01 RX ADMIN — APIXABAN 2.5 MG: 2.5 TABLET, FILM COATED ORAL at 21:23

## 2023-11-01 RX ADMIN — APIXABAN 2.5 MG: 2.5 TABLET, FILM COATED ORAL at 09:24

## 2023-11-01 RX ADMIN — IPRATROPIUM BROMIDE AND ALBUTEROL SULFATE 1 DOSE: 2.5; .5 SOLUTION RESPIRATORY (INHALATION) at 20:23

## 2023-11-01 RX ADMIN — MORPHINE SULFATE 2 MG: 2 INJECTION, SOLUTION INTRAMUSCULAR; INTRAVENOUS at 05:22

## 2023-11-01 RX ADMIN — METOPROLOL SUCCINATE 50 MG: 50 TABLET, EXTENDED RELEASE ORAL at 09:25

## 2023-11-01 RX ADMIN — IPRATROPIUM BROMIDE AND ALBUTEROL SULFATE 1 DOSE: 2.5; .5 SOLUTION RESPIRATORY (INHALATION) at 17:09

## 2023-11-01 RX ADMIN — LISINOPRIL 20 MG: 20 TABLET ORAL at 09:24

## 2023-11-01 RX ADMIN — FUROSEMIDE 40 MG: 40 TABLET ORAL at 09:24

## 2023-11-01 RX ADMIN — WATER 2000 MG: 1 INJECTION INTRAMUSCULAR; INTRAVENOUS; SUBCUTANEOUS at 05:21

## 2023-11-01 RX ADMIN — IPRATROPIUM BROMIDE AND ALBUTEROL SULFATE 1 DOSE: 2.5; .5 SOLUTION RESPIRATORY (INHALATION) at 08:51

## 2023-11-01 RX ADMIN — IPRATROPIUM BROMIDE AND ALBUTEROL SULFATE 1 DOSE: 2.5; .5 SOLUTION RESPIRATORY (INHALATION) at 12:41

## 2023-11-01 ASSESSMENT — PAIN SCALES - GENERAL: PAINLEVEL_OUTOF10: 7

## 2023-11-01 ASSESSMENT — PAIN DESCRIPTION - ORIENTATION: ORIENTATION: LEFT

## 2023-11-01 ASSESSMENT — PAIN DESCRIPTION - LOCATION: LOCATION: LEG

## 2023-11-01 ASSESSMENT — PAIN DESCRIPTION - DESCRIPTORS: DESCRIPTORS: ACHING

## 2023-11-01 NOTE — PLAN OF CARE
Problem: Chronic Conditions and Co-morbidities  Goal: Patient's chronic conditions and co-morbidity symptoms are monitored and maintained or improved  Outcome: Progressing  Flowsheets (Taken 10/31/2023 2100)  Care Plan - Patient's Chronic Conditions and Co-Morbidity Symptoms are Monitored and Maintained or Improved: Monitor and assess patient's chronic conditions and comorbid symptoms for stability, deterioration, or improvement     Problem: Discharge Planning  Goal: Discharge to home or other facility with appropriate resources  Outcome: Progressing  Flowsheets (Taken 10/31/2023 2100)  Discharge to home or other facility with appropriate resources: Identify barriers to discharge with patient and caregiver     Problem: Pain  Goal: Verbalizes/displays adequate comfort level or baseline comfort level  Outcome: Progressing  Flowsheets (Taken 10/31/2023 2100)  Verbalizes/displays adequate comfort level or baseline comfort level: Encourage patient to monitor pain and request assistance     Problem: Safety - Adult  Goal: Free from fall injury  Outcome: Progressing  Flowsheets (Taken 10/31/2023 2100)  Free From Fall Injury: Based on caregiver fall risk screen, instruct family/caregiver to ask for assistance with transferring infant if caregiver noted to have fall risk factors

## 2023-11-01 NOTE — DISCHARGE INSTR - COC
Equipment (for information only, NOT a DME order):  walker, bedside commode, and hospital bed  Other Treatments: ***    Patient's personal belongings (please select all that are sent with patient):  {[facility] DME Belongings:842123734}    RN SIGNATURE:  Electronically signed by Adrian Díaz RN on 11/2/23 at 10:01 AM EDT    CASE MANAGEMENT/SOCIAL WORK SECTION    Inpatient Status Date: ***    Readmission Risk Assessment Score:  Readmission Risk              Risk of Unplanned Readmission:  15           Discharging to Facility/ Agency   Name: [address] [place] [address]  Address:  Phone:  Fax:    Dialysis Facility (if applicable)   Name:  Address:  Dialysis Schedule:  Phone:  Fax:    / signature: Electronically signed by CATALINA Rust on 11/1/23 at 11:08 AM EDT    PHYSICIAN SECTION    Prognosis: {Prognosis:1963735143}    Condition at Discharge: [address] Replaced by [facility] [address] Patient Condition:[id number]}    Rehab Potential (if transferring to Rehab): {Prognosis:6507849896}    Recommended Labs or Other Treatments After Discharge: ***    Physician Certification: I certify the above information and transfer of Abih Mosley  is necessary for the continuing treatment of the diagnosis listed and that she requires [address] for less 30 days.      Update Admission H&P: {[facility]ME [facility]433174435}    PHYSICIAN SIGNATURE:  {Esignature:[id number]}

## 2023-11-01 NOTE — PROGRESS NOTES
Physical Therapy  Initial Assessment       Name: Ty Dennis  : 1940  MRN: [id number]      Date of Service: 2023    Evaluating PT:  Mark Turcios PT, DPT  [id number]    Room #:  7282/4072-G  Diagnosis:  Falls, initial encounter [V28. XXXA]  PMHx/PSHx:   has a past medical history of Arthritis, Atrial fibrillation ([address]), Carotid stenosis, right, Cerebral artery occlusion with cerebral infarction ([address]), Glaucoma, History of blood transfusion, Hx of blood clots, Hyperlipidemia, Hypertension, IBS (irritable bowel syndrome), Nausea, Nosebleed, and UTI (lower urinary tract infection). Procedure/Surgery:  L Hip Hemiarthroplasty 10/31/23  Precautions:  Falls, WBAT LLE , Anterolateral Hip, Aphasia, R Hemiparesis (mild), TSM  Equipment Needs:  TBD    SUBJECTIVE:    Pt lives with daughter in a 1 story home with 2+1 stairs to enter and no rail. Bed is on first floor and bath is on first floor. Pt ambulated with no AD independently PTA. Equipment Owned:   [employer]      OBJECTIVE:   Initial Evaluation  Date: 23 Treatment Short Term/ Long Term   Goals   AM-PAC 6 Clicks 3/29     Was pt agreeable to Eval/treatment? yes     Does pt have pain? Mild pain in L hip     Bed Mobility  Rolling: NT  Supine to sit:  MaxA  Sit to supine: NT  Scooting: MaxA  Rolling: Independent  Supine to sit:  Independent  Sit to supine: Independent  Scooting:   Independent   Transfers Sit to stand: [address]  Stand to sit: ModA  Stand pivot: NT  Sit to stand: supervision  Stand to sit: supervision  Stand pivot: supervision   Ambulation    3 feet with ModA x 2  WW  300 feet with supervision AAD   Stair negotiation: ascended and descended  NT  4 steps with single rail supervision   ROM BUE:  Defer to OT  BLE:  WFL     Strength BUE:  Defer to OT    RLE: 3/5  LLE: 3+/5  Improve 1 MMT   Balance Sitting EOB:  Rossana  Dynamic Standing:  MaxA  Sitting EOB:  Independent    Dynamic Standing:  supervision     Pt is A & O x 4  Sensation:  WNL  Edema:

## 2023-11-01 NOTE — ANESTHESIA POSTPROCEDURE EVALUATION
Department of Anesthesiology  Postprocedure Note    Patient: Annabel Lewis  MRN: [id number]  YOB: 1940  Date of evaluation: 11/1/2023      Procedure Summary     Date: 10/31/23 Room / Location: [address] [place] [address] OR 09 / [facility]    Anesthesia Start: 1250 Anesthesia Stop: 1430    Procedure: left HIP HEMIARTHROPLASTY (Left: Hip) Diagnosis: Closed fracture of left hip, initial encounter [facility])    Surgeons: Joseph Allen DO Responsible Provider: Elias Sidhu MD    Anesthesia Type: General ASA Status: 3          Anesthesia Type: General    Ana Phase I: Ana Score: 9    Ana Phase II:        Anesthesia Post Evaluation    Patient location during evaluation: PACU  Patient participation: complete - patient participated  Level of consciousness: awake  Pain score: 3  Airway patency: patent  Nausea & Vomiting: no nausea and no vomiting  Complications: no  Cardiovascular status: blood pressure returned to baseline  Respiratory status: acceptable  Hydration status: euvolemic

## 2023-11-01 NOTE — PROGRESS NOTES
Occupational Therapy          OCCUPATIONAL THERAPY INITIAL EVALUATION    CARLOS Betty [address] [facility]   [address], [place], [address] [facility]      Date:2023                 Patient Name: Ricardo Nava  MRN: [id number]  : 1940  Room: [address] [place] [address]-A    Referring Provider:  Brunilda Jennings DO  Specific Provider Orders/Date: OT evaluation and treat 10/31/23    Evaluating OT: Elfego Rogers. Corby OTR/L #[id number]    Diagnosis: Falls, initial encounter [W19. XXXA]      Surgery:   Past Surgical History:   Procedure Laterality Date    APPENDECTOMY      CAROTID ENDARTERECTOMY Left 2019    LEFT CAROTID ENDARTERECTOMY performed by Meño Arteaga MD at [facility] Bilateral     cataracts    HIP SURGERY Left 10/31/2023    left HIP HEMIARTHROPLASTY performed by Mona Her DO at [address] [place] [address] Drive          Pertinent Medical History:  has a past medical history of Arthritis, Atrial fibrillation ([address]), Carotid stenosis, right, Cerebral artery occlusion with cerebral infarction ([address]), Glaucoma, History of blood transfusion, Hx of blood clots, Hyperlipidemia, Hypertension, IBS (irritable bowel syndrome), Nausea, Nosebleed, and UTI (lower urinary tract infection).      Precautions:  Fall Risk, R UE weakness , aphasia, safety, anterolateral hip precautions, WBAT LLE    Assessment of current deficits   [x] Functional mobility  [x]ADLs  [x] Strength               [x]Cognition   [x] Functional transfers   [x] IADLs         [x] Safety Awareness   [x]Endurance   [] Fine Coordination              [x] Balance      [x] Vision/perception   []Sensation    []Gross Motor Coordination  [] ROM  [] Delirium                   [] Motor Control     OT PLAN OF CARE   OT POC based on physician orders, patient diagnosis and results of clinical assessment    Frequency/Duration   2-4 days/wk for 1 week PRN   Specific OT Treatment Interventions to include:   * Instruction/training on adapted ADL techniques and AE recommendations to increase functional independence within precautions       * Training on energy conservation strategies, correct breathing pattern and techniques to improve independence/tolerance for self-care routine  * Functional transfer/mobility training/DME recommendations for increased independence, safety, and fall prevention  * Patient/Family education to increase follow through with safety techniques and functional independence  * Recommendation of environmental modifications for increased safety with functional transfers/mobility and ADLs  * Cognitive retraining/development of therapeutic activities to improve problem solving, judgement, memory, and attention for increased safety/participation in ADL/IADL tasks  * Visual-perceptual training to improve environmental scanning, visual attention/focus, and oculomotor skills for increased safety/independence with functional transfers/mobility and ADLs  * Therapeutic activities to facilitate/challenge dynamic balance, stand tolerance for increased safety and independence with ADLs  * Therapeutic activities to facilitate gross/fine motor skills for increased independence with ADLs  * Positioning to improve skin integrity, interaction with environment and functional independence    Recommended Adaptive Equipment: ww, BSC, AE TBD    Home Living: Pt lives with daughter in a one story home with 2+1 steps and no hand rails. Bed and bath on main  floor with laundry in basement with full flight and  steps and B HR's.   Family/ Outside assistance available: daughter and son  Bathroom setup: walk in shower    Equipment owned: damian    Prior Level of Function: mod I  with ADLs , assisted with cooking and cleaning with IADLs; ambulated no AD has ww  Driving: no  Occupation:  teaching  Medication management: setup  Leisure: enjoys TV and word search books    Pain Level: no pain    Cognition: A&O: 2/4 unable to state dates poor expressive word finding;

## 2023-11-01 NOTE — CARE COORDINATION
Patient is POD #1 left hip hemiarthroplasty  Needs PT/OT. Per conversation with daughter she wanted [facility] and declined list. Referral called by SW to [facility]. Await PT/OT notes and acceptance then will need precert. CM/ALINE to follow Patient has been accepted at [address]. [facility] will start precert once pt/ot notes in Epic. PASSR and Ambulance form( will need completed) in envelope in soft chart. Patient has been accepted at [facility] she obtained precert and is good through SAT.

## 2023-11-02 VITALS
OXYGEN SATURATION: 94 % | RESPIRATION RATE: 18 BRPM | SYSTOLIC BLOOD PRESSURE: 145 MMHG | BODY MASS INDEX: 27.05 KG/M2 | HEART RATE: 78 BPM | TEMPERATURE: 97.6 F | WEIGHT: 147 LBS | HEIGHT: 62 IN | DIASTOLIC BLOOD PRESSURE: 68 MMHG

## 2023-11-02 LAB
ANION GAP SERPL CALCULATED.3IONS-SCNC: 13 MMOL/L (ref 7–16)
BASOPHILS # BLD: 0.04 K/UL (ref 0–0.2)
BASOPHILS NFR BLD: 1 % (ref 0–2)
BUN SERPL-MCNC: 29 MG/DL (ref 6–23)
CALCIUM SERPL-MCNC: 8.3 MG/DL (ref 8.6–10.2)
CHLORIDE SERPL-SCNC: 103 MMOL/L (ref 98–107)
CO2 SERPL-SCNC: 21 MMOL/L (ref 22–29)
CREAT SERPL-MCNC: 0.9 MG/DL (ref 0.5–1)
EOSINOPHIL # BLD: 0.34 K/UL (ref 0.05–0.5)
EOSINOPHILS RELATIVE PERCENT: 4 % (ref 0–6)
ERYTHROCYTE [DISTWIDTH] IN BLOOD BY AUTOMATED COUNT: 15.3 % (ref 11.5–15)
GFR SERPL CREATININE-BSD FRML MDRD: >60 ML/MIN/1.73M2
GLUCOSE BLD-MCNC: 127 MG/DL (ref 74–99)
GLUCOSE SERPL-MCNC: 98 MG/DL (ref 74–99)
HCT VFR BLD AUTO: 33.5 % (ref 34–48)
HGB BLD-MCNC: 10.9 G/DL (ref 11.5–15.5)
IMM GRANULOCYTES # BLD AUTO: 0.04 K/UL (ref 0–0.58)
IMM GRANULOCYTES NFR BLD: 1 % (ref 0–5)
LYMPHOCYTES NFR BLD: 0.57 K/UL (ref 1.5–4)
LYMPHOCYTES RELATIVE PERCENT: 7 % (ref 20–42)
MAGNESIUM SERPL-MCNC: 2 MG/DL (ref 1.6–2.6)
MCH RBC QN AUTO: 27.9 PG (ref 26–35)
MCHC RBC AUTO-ENTMCNC: 32.5 G/DL (ref 32–34.5)
MCV RBC AUTO: 85.9 FL (ref 80–99.9)
MONOCYTES NFR BLD: 0.66 K/UL (ref 0.1–0.95)
MONOCYTES NFR BLD: 8 % (ref 2–12)
NEUTROPHILS NFR BLD: 81 % (ref 43–80)
NEUTS SEG NFR BLD: 6.8 K/UL (ref 1.8–7.3)
PLATELET # BLD AUTO: 135 K/UL (ref 130–450)
PMV BLD AUTO: 10.3 FL (ref 7–12)
POTASSIUM SERPL-SCNC: 3.3 MMOL/L (ref 3.5–5)
RBC # BLD AUTO: 3.9 M/UL (ref 3.5–5.5)
RBC # BLD: NORMAL 10*6/UL
SODIUM SERPL-SCNC: 137 MMOL/L (ref 132–146)
WBC OTHER # BLD: 8.5 K/UL (ref 4.5–11.5)

## 2023-11-02 PROCEDURE — 97530 THERAPEUTIC ACTIVITIES: CPT

## 2023-11-02 PROCEDURE — 83735 ASSAY OF MAGNESIUM: CPT

## 2023-11-02 PROCEDURE — 82962 GLUCOSE BLOOD TEST: CPT

## 2023-11-02 PROCEDURE — 6370000000 HC RX 637 (ALT 250 FOR IP): Performed by: INTERNAL MEDICINE

## 2023-11-02 PROCEDURE — 6370000000 HC RX 637 (ALT 250 FOR IP): Performed by: FAMILY MEDICINE

## 2023-11-02 PROCEDURE — 85025 COMPLETE CBC W/AUTO DIFF WBC: CPT

## 2023-11-02 PROCEDURE — 94640 AIRWAY INHALATION TREATMENT: CPT

## 2023-11-02 PROCEDURE — 6370000000 HC RX 637 (ALT 250 FOR IP): Performed by: NURSE PRACTITIONER

## 2023-11-02 PROCEDURE — 2580000003 HC RX 258: Performed by: FAMILY MEDICINE

## 2023-11-02 PROCEDURE — 80048 BASIC METABOLIC PNL TOTAL CA: CPT

## 2023-11-02 PROCEDURE — 97535 SELF CARE MNGMENT TRAINING: CPT

## 2023-11-02 PROCEDURE — 36415 COLL VENOUS BLD VENIPUNCTURE: CPT

## 2023-11-02 RX ORDER — FUROSEMIDE 40 MG/1
40 TABLET ORAL DAILY
Qty: 3 TABLET | Refills: 0 | DISCHARGE
Start: 2023-11-02 | End: 2023-11-02 | Stop reason: HOSPADM

## 2023-11-02 RX ORDER — POTASSIUM CHLORIDE 20 MEQ/1
20 TABLET, EXTENDED RELEASE ORAL ONCE
Status: COMPLETED | OUTPATIENT
Start: 2023-11-02 | End: 2023-11-02

## 2023-11-02 RX ADMIN — Medication 10 ML: at 09:35

## 2023-11-02 RX ADMIN — POTASSIUM CHLORIDE 20 MEQ: 1500 TABLET, EXTENDED RELEASE ORAL at 09:35

## 2023-11-02 RX ADMIN — FUROSEMIDE 40 MG: 40 TABLET ORAL at 09:35

## 2023-11-02 RX ADMIN — METOPROLOL SUCCINATE 50 MG: 50 TABLET, EXTENDED RELEASE ORAL at 09:35

## 2023-11-02 RX ADMIN — APIXABAN 2.5 MG: 2.5 TABLET, FILM COATED ORAL at 09:35

## 2023-11-02 RX ADMIN — IPRATROPIUM BROMIDE AND ALBUTEROL SULFATE 1 DOSE: 2.5; .5 SOLUTION RESPIRATORY (INHALATION) at 09:25

## 2023-11-02 NOTE — CARE COORDINATION
11/2. Discharge order noted. Authorization received for [facility]. Transportation arranged with physician's ambulance for 1200. Daughter, facility, nursing notified.  Sandi Mckeon RN

## 2023-11-02 NOTE — DISCHARGE SUMMARY
[facility]   Discharge summary   Patient ID:  Jesus Bearden  [id number]  78 y.o.  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[facility])    Persistent atrial fibrillation (HCC)    Essential hypertension    Mixed hyperlipidemia    Stenosis of left carotid artery    Bilateral carotid artery stenosis    GI bleed    Cerebrovascular accident (CVA) ([address])    Atrial fibrillation ([address])    Falls, initial encounter       Discharge Diagnoses: Fall resulting in a left subcapital femur neck hip fracture    Consults: orthopedic surgery    Procedures: left hip hemiarthroplasty    Hospital Course:   Patient is an 78-year-old female admitted to [facility] for     Falls with left subcapital femoral neck hip fracture  -Monitor labs  -Orthopedic surgery following  -N.p.o. until seen by Ortho  -Pain management  -Hold Eliquis for now in preparation for OR tomorrow  -PT OT when okay with Ortho  -From medicine standpoint, she is low to moderate risk for cardiovascular event given advanced age and history of cardiac disease. She currently denies any chest pain or shortness of breath.   This was discussed with daughter at bedside, she is agreeable to proceed        Atrial fibrillation  -Status post Watchman procedure April 2023  -Continue home metoprolol for rate control  -Hold Eliquis for now in preparation for surgery  -No chest pain or shortness of  -EKG reviewed-atrial fibrillation, rate controlled     Hypertension  -Continue other BP medications for BP control  -Continue to monitor Bps     10/31/23  -Plans for OR today for surgical repair of left femoral neck fracture  -Mild hypokalemia this a.m., 3.3, replace  -Other labs stable  -Currently requiring 3 L supplemental O2 satting 92%, check proBNP  -BNP elevated at greater than 7000  -Continue IV Lasix after today's dose of 40x1  -Continue

## 2023-11-02 NOTE — PROGRESS NOTES
Rossana  Dynamic Standing:  MaxA  Sitting EOB:  Independent    Dynamic Standing:  supervision     Supine exercises to L LE ROM exercises as tolerated to improve strength and functional mobility  Patient education  Pt educated on technique with bed mobility     Patient response to education:   Pt verbalized understanding Pt demonstrated skill Pt requires further education in this area   x  x     ASSESSMENT:    Conditions Requiring Skilled Therapeutic Intervention:    [x]Decreased strength     [x]Decreased ROM  [x]Decreased functional mobility  [x]Decreased balance   [x]Decreased endurance   []Decreased posture  []Decreased sensation  []Decreased coordination   []Decreased vision  [x]Decreased safety awareness   [x]Increased pain       Comments:  Pt in bed upon arrival and agreed to participate in therapy. Pt complete functional mobility as noted above. Assistance with L LE management and trunk with bed mobility . Pt sat on edge of bed  with posterior lean with initial sitting requiring min A improving to SBA. Pt stood from bed to walker with heavy assist and pivoted to bed side. Pt was incontinent during stand pivot. Pt remained in chair with daughter present with call light in reach. Treatment:  Patient practiced and was instructed in the following treatment:    Bed mobility- Verbal instruction for technique. Assistance to required complete task. Sat on edge of bed to promote upright and postural awareness  Functional transfers-Verbal instruction for hand placement and technique to improve safety and balance. Assistance required to complete task. Pt's/ family goals   1. Get better    Prognosis is good for reaching above PT goals. Patient and or family understand(s) diagnosis, prognosis, and plan of care.   yes    PHYSICAL THERAPY PLAN OF CARE:    PT POC is established based on physician order and patient diagnosis     Referring provider/PT Order:    10/31/23 1615  PT eval and treat  Start: 10/31/23 1615,   End:  10/31/23 1615,   ONE TIME,   Standing Count:  1 Occurrences,   R         Jaimee Marrufo DO     Diagnosis:  Falls, initial encounter [W19. XXXA]  Specific instructions for next treatment:  Functional mobility    Current Treatment Recommendations:     [x] Strengthening to improve independence with functional mobility   [x] ROM to improve independence with functional mobility   [x] Balance Training to improve static/dynamic balance and to reduce fall risk  [x] Endurance Training to improve activity tolerance during functional mobility   [x] Transfer Training to improve safety and independence with all functional transfers   [x] Gait Training to improve gait mechanics, endurance and assess need for appropriate assistive device  [x] Stair Training in preparation for safe discharge home and/or into the community   [x] Positioning to prevent skin breakdown and contractures  [x] Safety and Education Training   [x] Patient/Caregiver Education   [x] HEP  [] Other     PT long term treatment goals are located in above grid    Frequency of treatments: 5-7x/week x 1-2 weeks.     Time in  1000  Time out  1040    Total Treatment Time  40 minutes         CPT codes:  [] Low Complexity PT evaluation 02714  [] Moderate Complexity PT evaluation 04675  [] High Complexity PT evaluation 57030  [] PT Re-evaluation 05925  [] Gait training 31832 0 minutes  [] Manual therapy 03784 0 minutes  [x] Therapeutic activities 34767 40 minutes  [] Therapeutic exercises 22580 0 minutes  [] Neuromuscular reeducation 95691 0 minutes     Glory Bothell [id number]

## 2023-11-02 NOTE — PROGRESS NOTES
Occupational Therapy  OT BEDSIDE TREATMENT NOTE    [facility] [address] [facility]   [address], [place]      DXGH:  Patient Name: Bravo Cazares  MRN: [id number]  : 1940  Room: [address] [place] [address]-A     Referring Provider:  Nikki Reese DO  Specific Provider Orders/Date: OT evaluation and treat 10/31/23     Evaluating OT: Eligio Blackburn. Corby, OTR/L #[id number]     Diagnosis: Falls, initial encounter [W19. XXXA]       Surgery:   Past Surgical History         Past Surgical History:   Procedure Laterality Date    APPENDECTOMY        CAROTID ENDARTERECTOMY Left 2019     LEFT CAROTID ENDARTERECTOMY performed by Ramona Dukes MD at [facility] Bilateral       cataracts    HIP SURGERY Left 10/31/2023     left HIP HEMIARTHROPLASTY performed by Denis Rios DO at [address]               Pertinent Medical History:  has a past medical history of Arthritis, Atrial fibrillation ([address]), Carotid stenosis, right, Cerebral artery occlusion with cerebral infarction ([address]), Glaucoma, History of blood transfusion, Hx of blood clots, Hyperlipidemia, Hypertension, IBS (irritable bowel syndrome), Nausea, Nosebleed, and UTI (lower urinary tract infection).       Precautions:  Fall Risk, R UE weakness , aphasia, safety, anterolateral hip precautions, WBAT LLE     Assessment of current deficits   [x] Functional mobility             [x]ADLs           [x] Strength                  [x]Cognition   [x] Functional transfers           [x] IADLs         [x] Safety Awareness   [x]Endurance   [] Fine Coordination              [x] Balance      [x] Vision/perception   []Sensation     []Gross Motor Coordination  [] ROM           [] Delirium                   [] Motor Control      OT PLAN OF CARE   OT POC based on physician orders, patient diagnosis and results of clinical assessment     Frequency/Duration   2-4 days/wk for 1 week PRN   Specific OT Treatment Interventions to include:   * transfers. At end of session pt left seated in bedside chair, call light within reach, daughter present. Pt has made fair progress towards set goals.      Continue with current plan of care    Treatment Time In: 10:00            Treatment Time Out: 10:40             Treatment Charges: Mins Units   Ther Ex  39280     Manual Therapy 71835     Thera Activities 05096 15 1   ADL/Home Mgt 01263 25 2   Neuro Re-ed 87079     Group Therapy      Orthotic manage/training  92372     Non-Billable Time     Total Timed Treatment 40 [address]

## 2023-11-02 NOTE — PLAN OF CARE
Problem: Chronic Conditions and Co-morbidities  Goal: Patient's chronic conditions and co-morbidity symptoms are monitored and maintained or improved  Outcome: Progressing  Flowsheets (Taken 11/1/2023 2200)  Care Plan - Patient's Chronic Conditions and Co-Morbidity Symptoms are Monitored and Maintained or Improved: Monitor and assess patient's chronic conditions and comorbid symptoms for stability, deterioration, or improvement     Problem: Discharge Planning  Goal: Discharge to home or other facility with appropriate resources  Outcome: Progressing  Flowsheets (Taken 11/1/2023 2200)  Discharge to home or other facility with appropriate resources: Identify barriers to discharge with patient and caregiver     Problem: Pain  Goal: Verbalizes/displays adequate comfort level or baseline comfort level  Outcome: Progressing     Problem: Safety - Adult  Goal: Free from fall injury  Outcome: Progressing  Flowsheets (Taken 11/1/2023 2200)  Free From Fall Injury: Based on caregiver fall risk screen, instruct family/caregiver to ask for assistance with transferring infant if caregiver noted to have fall risk factors

## 2023-11-03 LAB — SURGICAL PATHOLOGY REPORT: NORMAL

## 2023-11-03 NOTE — PROGRESS NOTES
Physician Progress Note      Tristin Tarango  CSN #:                  [id number]  :                       1940  ADMIT DATE:       10/30/2023 10:08 AM  DISCH DATE:        2023 1:01 PM  RESPONDING  PROVIDER #:        Isaura Alexander MD          QUERY TEXT:    Internal Medicine,    Pt admitted with a subcapital fracture of the left femoral neck and has   osteopenia documented in the imaging report. If possible, please document in   progress notes and discharge summary if you are evaluating and/or treating any   of the following: The medical record reflects the following:  Risk Factors: osteopenia, fall  Clinical Indicators: s/p fall, osteopenia, Xray pevis documents  mild   degenerative change of both hips with generalized osteopenia  Treatment: labs, imaging, Orthopedic consult, supportive care    Thank you,  Eris Walker RN [facility]  [phone]  Options provided:  -- Pathological left femoral neck fracture due to osteopenia  -- Pathological left femoral neck fracture due to osteopenia following fall   which would not usually break a normal, healthy bone  -- Osteoporotic  left femoral neck Fracture  -- Osteoporotic left femoral neck fracture following fall which would not   usually break a normal, healthy bone  -- Traumatic left femoral neck fracture  -- Other - I will add my own diagnosis  -- Disagree - Not applicable / Not valid  -- Disagree - Clinically unable to determine / Unknown  -- Refer to Clinical Documentation Reviewer    PROVIDER RESPONSE TEXT:    This patient has a pathological left femoral neck fracture due to osteopenia. Query created by: Eris Walker on 10/31/2023 11:24 AM      QUERY TEXT:    Internal Medicine,    Patient admitted with left subcapital femur fx, noted to have atrial   fibrillation. If possible, please document in progress notes and discharge   summary further specificity regarding the type of atrial fibrillation:     The medical record reflects the following:  Risk Factors: HTN  Clinical Indicators: documentation of atrial fibrillation with type   specificity  Treatment: Eliquis on hold for possible surgical intervention of fx    Thank you,  Joseph Graff RN CDS  [phone]      Chronic: nonspecific term that could be referring to paroxysmal, persistent,   or permanent  Longstanding persistent: persistent and continuous, lasting > 1 year. Paroxysmal - self-terminating or intermittent; resolves with or without   intervention within 7 days of onset; may recur with various frequency. Persistent - Fails to terminate within 7 days; Often requires meds or   cardioversion to restore to NSR. Permanent - longstanding & persistent; Medication has been ineffective in   restoring NSR &/or cardioversion is contraindicated    Definitions per [employer] Training Guide and Quick Reference Guide, [place] [address]   Diseases and Disorders of the Circulatory System; 2019; [employer]. Software content   from the [employer]? Advanced CDI Transformation Program  Options provided:  -- Paroxysmal Atrial Fibrillation  -- Longstanding Persistent Atrial Fibrillation  -- Permanent Atrial Fibrillation  -- Persistent Atrial Fibrillation  -- Chronic Atrial Fibrillation, unspecified  -- Other - I will add my own diagnosis  -- Disagree - Not applicable / Not valid  -- Disagree - Clinically unable to determine / Unknown  -- Refer to Clinical Documentation Reviewer    PROVIDER RESPONSE TEXT:    This patient has paroxysmal atrial fibrillation. Query created by: Joseph Graff on 10/31/2023 11:27 AM      QUERY TEXT:    Internal Medicine,    Patient admitted with left subcapital femur fx, noted to have atrial   fibrillation and is maintained on  Eliquis. If possible, please document in   progress notes and discharge summary if you are evaluating and/or treating any   of the following:     The medical record reflects the following:  Risk Factors: atrial fib  Clinical Indicators:  documentation of atrial fib maintained on

## 2023-11-07 ENCOUNTER — OUTSIDE SERVICES (OUTPATIENT)
Dept: INTERNAL MEDICINE CLINIC | Age: 83
End: 2023-11-07

## 2023-11-07 NOTE — PROGRESS NOTES
Jacquelyn Barrera, ANTHONY - CNP  11/7/2023     This report was generated using a computer driven dictation system. This system may results in transcription errors. Every effort is made to identify and correct errors, however errors may still occur.

## 2023-11-09 ENCOUNTER — APPOINTMENT (OUTPATIENT)
Dept: CT IMAGING | Age: 83
End: 2023-11-09
Payer: MEDICARE

## 2023-11-09 ENCOUNTER — APPOINTMENT (OUTPATIENT)
Dept: GENERAL RADIOLOGY | Age: 83
End: 2023-11-09
Payer: MEDICARE

## 2023-11-09 ENCOUNTER — HOSPITAL ENCOUNTER (INPATIENT)
Age: 83
LOS: 8 days | Discharge: NURSING, REHAB OR LONG-TERM CARE | End: 2023-11-17
Attending: STUDENT IN AN ORGANIZED HEALTH CARE EDUCATION/TRAINING PROGRAM | Admitting: FAMILY MEDICINE
Payer: MEDICARE

## 2023-11-09 ENCOUNTER — OUTSIDE SERVICES (OUTPATIENT)
Dept: INTERNAL MEDICINE CLINIC | Age: 83
End: 2023-11-09

## 2023-11-09 DIAGNOSIS — A41.9 SEPSIS, DUE TO UNSPECIFIED ORGANISM, UNSPECIFIED WHETHER ACUTE ORGAN DYSFUNCTION PRESENT (HCC): Primary | ICD-10-CM

## 2023-11-09 DIAGNOSIS — N17.9 ACUTE KIDNEY INJURY (HCC): ICD-10-CM

## 2023-11-09 DIAGNOSIS — N39.0 URINARY TRACT INFECTION WITHOUT HEMATURIA, SITE UNSPECIFIED: ICD-10-CM

## 2023-11-09 LAB
ALBUMIN SERPL-MCNC: 3.2 G/DL (ref 3.5–5.2)
ALP SERPL-CCNC: 116 U/L (ref 35–104)
ALT SERPL-CCNC: 21 U/L (ref 0–32)
ANION GAP SERPL CALCULATED.3IONS-SCNC: 13 MMOL/L (ref 7–16)
AST SERPL-CCNC: 34 U/L (ref 0–31)
BACTERIA URNS QL MICRO: ABNORMAL
BASOPHILS # BLD: 0.06 K/UL (ref 0–0.2)
BASOPHILS NFR BLD: 1 % (ref 0–2)
BILIRUB DIRECT SERPL-MCNC: 0.6 MG/DL (ref 0–0.3)
BILIRUB INDIRECT SERPL-MCNC: 1.3 MG/DL (ref 0–1)
BILIRUB SERPL-MCNC: 1.9 MG/DL (ref 0–1.2)
BILIRUB UR QL STRIP: ABNORMAL
BUN SERPL-MCNC: 31 MG/DL (ref 6–23)
CALCIUM SERPL-MCNC: 7.9 MG/DL (ref 8.6–10.2)
CASTS #/AREA URNS LPF: ABNORMAL /LPF
CHLORIDE SERPL-SCNC: 96 MMOL/L (ref 98–107)
CLARITY UR: CLEAR
CO2 SERPL-SCNC: 25 MMOL/L (ref 22–29)
COLOR UR: YELLOW
CREAT SERPL-MCNC: 1.4 MG/DL (ref 0.5–1)
EKG ATRIAL RATE: 83 BPM
EKG Q-T INTERVAL: 422 MS
EKG QRS DURATION: 126 MS
EKG QTC CALCULATION (BAZETT): 468 MS
EKG R AXIS: 36 DEGREES
EKG T AXIS: -147 DEGREES
EKG VENTRICULAR RATE: 74 BPM
EOSINOPHIL # BLD: 0.12 K/UL (ref 0.05–0.5)
EOSINOPHILS RELATIVE PERCENT: 1 % (ref 0–6)
EPI CELLS #/AREA URNS HPF: ABNORMAL /HPF
ERYTHROCYTE [DISTWIDTH] IN BLOOD BY AUTOMATED COUNT: 15.4 % (ref 11.5–15)
GFR SERPL CREATININE-BSD FRML MDRD: 39 ML/MIN/1.73M2
GLUCOSE SERPL-MCNC: 138 MG/DL (ref 74–99)
GLUCOSE UR STRIP-MCNC: 100 MG/DL
HCT VFR BLD AUTO: 33.5 % (ref 34–48)
HGB BLD-MCNC: 10.6 G/DL (ref 11.5–15.5)
HGB UR QL STRIP.AUTO: NEGATIVE
IMM GRANULOCYTES # BLD AUTO: 0.12 K/UL (ref 0–0.58)
IMM GRANULOCYTES NFR BLD: 1 % (ref 0–5)
INFLUENZA A BY PCR: NOT DETECTED
INFLUENZA B BY PCR: NOT DETECTED
KETONES UR STRIP-MCNC: ABNORMAL MG/DL
LACTATE BLDV-SCNC: 2 MMOL/L (ref 0.5–2.2)
LEUKOCYTE ESTERASE UR QL STRIP: ABNORMAL
LIPASE SERPL-CCNC: 38 U/L (ref 13–60)
LYMPHOCYTES NFR BLD: 0.78 K/UL (ref 1.5–4)
LYMPHOCYTES RELATIVE PERCENT: 6 % (ref 20–42)
MAGNESIUM SERPL-MCNC: 2.4 MG/DL (ref 1.6–2.6)
MCH RBC QN AUTO: 27.9 PG (ref 26–35)
MCHC RBC AUTO-ENTMCNC: 31.6 G/DL (ref 32–34.5)
MCV RBC AUTO: 88.2 FL (ref 80–99.9)
MONOCYTES NFR BLD: 0.89 K/UL (ref 0.1–0.95)
MONOCYTES NFR BLD: 7 % (ref 2–12)
MUCOUS THREADS URNS QL MICRO: PRESENT
NEUTROPHILS NFR BLD: 84 % (ref 43–80)
NEUTS SEG NFR BLD: 10.47 K/UL (ref 1.8–7.3)
NITRITE UR QL STRIP: NEGATIVE
PH UR STRIP: 6 [PH] (ref 5–9)
PLATELET # BLD AUTO: 280 K/UL (ref 130–450)
PMV BLD AUTO: 9.4 FL (ref 7–12)
POTASSIUM SERPL-SCNC: 3.9 MMOL/L (ref 3.5–5)
PROT SERPL-MCNC: 6.2 G/DL (ref 6.4–8.3)
PROT UR STRIP-MCNC: 30 MG/DL
RBC # BLD AUTO: 3.8 M/UL (ref 3.5–5.5)
RBC #/AREA URNS HPF: ABNORMAL /HPF
SARS-COV-2 RDRP RESP QL NAA+PROBE: NOT DETECTED
SODIUM SERPL-SCNC: 134 MMOL/L (ref 132–146)
SP GR UR STRIP: 1.01 (ref 1–1.03)
SPECIMEN DESCRIPTION: NORMAL
TROPONIN I SERPL HS-MCNC: 36 NG/L (ref 0–9)
TROPONIN I SERPL HS-MCNC: 39 NG/L (ref 0–9)
UROBILINOGEN UR STRIP-ACNC: >8 EU/DL (ref 0–1)
WBC #/AREA URNS HPF: ABNORMAL /HPF
WBC OTHER # BLD: 12.4 K/UL (ref 4.5–11.5)

## 2023-11-09 PROCEDURE — 87635 SARS-COV-2 COVID-19 AMP PRB: CPT

## 2023-11-09 PROCEDURE — 85025 COMPLETE CBC W/AUTO DIFF WBC: CPT

## 2023-11-09 PROCEDURE — 87502 INFLUENZA DNA AMP PROBE: CPT

## 2023-11-09 PROCEDURE — 83735 ASSAY OF MAGNESIUM: CPT

## 2023-11-09 PROCEDURE — 70450 CT HEAD/BRAIN W/O DYE: CPT

## 2023-11-09 PROCEDURE — 71045 X-RAY EXAM CHEST 1 VIEW: CPT

## 2023-11-09 PROCEDURE — 2580000003 HC RX 258: Performed by: NURSE PRACTITIONER

## 2023-11-09 PROCEDURE — 6360000002 HC RX W HCPCS: Performed by: NURSE PRACTITIONER

## 2023-11-09 PROCEDURE — 84484 ASSAY OF TROPONIN QUANT: CPT

## 2023-11-09 PROCEDURE — 87086 URINE CULTURE/COLONY COUNT: CPT

## 2023-11-09 PROCEDURE — 1200000000 HC SEMI PRIVATE

## 2023-11-09 PROCEDURE — 83605 ASSAY OF LACTIC ACID: CPT

## 2023-11-09 PROCEDURE — 93010 ELECTROCARDIOGRAM REPORT: CPT | Performed by: INTERNAL MEDICINE

## 2023-11-09 PROCEDURE — 2580000003 HC RX 258

## 2023-11-09 PROCEDURE — 81001 URINALYSIS AUTO W/SCOPE: CPT

## 2023-11-09 PROCEDURE — 80053 COMPREHEN METABOLIC PANEL: CPT

## 2023-11-09 PROCEDURE — 83690 ASSAY OF LIPASE: CPT

## 2023-11-09 PROCEDURE — 93005 ELECTROCARDIOGRAM TRACING: CPT

## 2023-11-09 PROCEDURE — 82248 BILIRUBIN DIRECT: CPT

## 2023-11-09 PROCEDURE — 96360 HYDRATION IV INFUSION INIT: CPT

## 2023-11-09 PROCEDURE — 96361 HYDRATE IV INFUSION ADD-ON: CPT

## 2023-11-09 PROCEDURE — 99285 EMERGENCY DEPT VISIT HI MDM: CPT

## 2023-11-09 PROCEDURE — 6360000002 HC RX W HCPCS

## 2023-11-09 PROCEDURE — 6370000000 HC RX 637 (ALT 250 FOR IP): Performed by: NURSE PRACTITIONER

## 2023-11-09 RX ORDER — BISACODYL 10 MG
10 SUPPOSITORY, RECTAL RECTAL DAILY PRN
COMMUNITY

## 2023-11-09 RX ORDER — SODIUM CHLORIDE 9 MG/ML
INJECTION, SOLUTION INTRAVENOUS CONTINUOUS
Status: ACTIVE | OUTPATIENT
Start: 2023-11-09 | End: 2023-11-10

## 2023-11-09 RX ORDER — 0.9 % SODIUM CHLORIDE 0.9 %
1000 INTRAVENOUS SOLUTION INTRAVENOUS ONCE
Status: COMPLETED | OUTPATIENT
Start: 2023-11-09 | End: 2023-11-09

## 2023-11-09 RX ORDER — SODIUM CHLORIDE 0.9 % (FLUSH) 0.9 %
5-40 SYRINGE (ML) INJECTION PRN
Status: DISCONTINUED | OUTPATIENT
Start: 2023-11-09 | End: 2023-11-17 | Stop reason: HOSPADM

## 2023-11-09 RX ORDER — ACETAMINOPHEN 650 MG/1
650 SUPPOSITORY RECTAL EVERY 6 HOURS PRN
Status: DISCONTINUED | OUTPATIENT
Start: 2023-11-09 | End: 2023-11-17 | Stop reason: HOSPADM

## 2023-11-09 RX ORDER — SODIUM CHLORIDE 0.9 % (FLUSH) 0.9 %
5-40 SYRINGE (ML) INJECTION EVERY 12 HOURS SCHEDULED
Status: DISCONTINUED | OUTPATIENT
Start: 2023-11-09 | End: 2023-11-17 | Stop reason: HOSPADM

## 2023-11-09 RX ORDER — ATORVASTATIN CALCIUM 40 MG/1
40 TABLET, FILM COATED ORAL NIGHTLY
Status: DISCONTINUED | OUTPATIENT
Start: 2023-11-09 | End: 2023-11-17 | Stop reason: HOSPADM

## 2023-11-09 RX ORDER — ENEMA 19; 7 G/133ML; G/133ML
1 ENEMA RECTAL DAILY PRN
COMMUNITY

## 2023-11-09 RX ORDER — ACETAMINOPHEN 325 MG/1
650 TABLET ORAL EVERY 6 HOURS PRN
COMMUNITY

## 2023-11-09 RX ORDER — METOPROLOL SUCCINATE 50 MG/1
50 TABLET, EXTENDED RELEASE ORAL 2 TIMES DAILY
Status: DISCONTINUED | OUTPATIENT
Start: 2023-11-09 | End: 2023-11-17 | Stop reason: HOSPADM

## 2023-11-09 RX ORDER — ONDANSETRON 4 MG/1
4 TABLET, ORALLY DISINTEGRATING ORAL EVERY 8 HOURS PRN
Status: DISCONTINUED | OUTPATIENT
Start: 2023-11-09 | End: 2023-11-17 | Stop reason: HOSPADM

## 2023-11-09 RX ORDER — SODIUM CHLORIDE 9 MG/ML
INJECTION, SOLUTION INTRAVENOUS PRN
Status: DISCONTINUED | OUTPATIENT
Start: 2023-11-09 | End: 2023-11-17 | Stop reason: HOSPADM

## 2023-11-09 RX ORDER — ACETAMINOPHEN 325 MG/1
650 TABLET ORAL EVERY 6 HOURS PRN
Status: DISCONTINUED | OUTPATIENT
Start: 2023-11-09 | End: 2023-11-17 | Stop reason: HOSPADM

## 2023-11-09 RX ORDER — ONDANSETRON 2 MG/ML
4 INJECTION INTRAMUSCULAR; INTRAVENOUS EVERY 6 HOURS PRN
Status: DISCONTINUED | OUTPATIENT
Start: 2023-11-09 | End: 2023-11-17 | Stop reason: HOSPADM

## 2023-11-09 RX ORDER — LANOLIN ALCOHOL/MO/W.PET/CERES
1000 CREAM (GRAM) TOPICAL DAILY
COMMUNITY

## 2023-11-09 RX ORDER — LISINOPRIL 20 MG/1
20 TABLET ORAL DAILY
Status: DISCONTINUED | OUTPATIENT
Start: 2023-11-09 | End: 2023-11-17 | Stop reason: HOSPADM

## 2023-11-09 RX ORDER — AMLODIPINE BESYLATE 5 MG/1
5 TABLET ORAL NIGHTLY
Status: DISCONTINUED | OUTPATIENT
Start: 2023-11-09 | End: 2023-11-17 | Stop reason: HOSPADM

## 2023-11-09 RX ADMIN — APIXABAN 2.5 MG: 2.5 TABLET, FILM COATED ORAL at 21:08

## 2023-11-09 RX ADMIN — WATER 1000 MG: 1 INJECTION INTRAMUSCULAR; INTRAVENOUS; SUBCUTANEOUS at 18:44

## 2023-11-09 RX ADMIN — ATORVASTATIN CALCIUM 40 MG: 40 TABLET, FILM COATED ORAL at 21:09

## 2023-11-09 RX ADMIN — SODIUM CHLORIDE 1000 ML: 9 INJECTION, SOLUTION INTRAVENOUS at 12:56

## 2023-11-09 RX ADMIN — AMLODIPINE BESYLATE 5 MG: 5 TABLET ORAL at 21:08

## 2023-11-09 RX ADMIN — WATER 1000 MG: 1 INJECTION INTRAMUSCULAR; INTRAVENOUS; SUBCUTANEOUS at 15:13

## 2023-11-09 RX ADMIN — METOPROLOL SUCCINATE 50 MG: 50 TABLET, EXTENDED RELEASE ORAL at 21:09

## 2023-11-09 RX ADMIN — SODIUM CHLORIDE: 9 INJECTION, SOLUTION INTRAVENOUS at 18:49

## 2023-11-09 RX ADMIN — SODIUM CHLORIDE 1000 ML: 9 INJECTION, SOLUTION INTRAVENOUS at 13:59

## 2023-11-09 RX ADMIN — ONDANSETRON 4 MG: 2 INJECTION INTRAMUSCULAR; INTRAVENOUS at 18:46

## 2023-11-09 ASSESSMENT — ENCOUNTER SYMPTOMS
VOMITING: 1
RESPIRATORY NEGATIVE: 1
NAUSEA: 1
BACK PAIN: 1
ALLERGIC/IMMUNOLOGIC NEGATIVE: 1
EYES NEGATIVE: 1

## 2023-11-09 ASSESSMENT — PAIN - FUNCTIONAL ASSESSMENT: PAIN_FUNCTIONAL_ASSESSMENT: NONE - DENIES PAIN

## 2023-11-09 NOTE — ED NOTES
Nurse to nurse called to 5S. Accepting RN updated on pt plan and condition. Pt to be transported to accepting unit. Will continue to closely monitor pt.        Meghan Winters RN  11/09/23 9149

## 2023-11-09 NOTE — PROGRESS NOTES
Database initiated. Patient is alert to self. Comes in from [facility]. She uses a walker and wheelchair and is RA. She has fallen recently. Restless and calling out in the bed at the moment.

## 2023-11-09 NOTE — ACP (ADVANCE CARE PLANNING)
Advance Care Planning   Healthcare Decision Maker:    Primary Decision Maker: Jessie Mccollum [facility] - [phone]    Lynnette Mane.

## 2023-11-09 NOTE — PROGRESS NOTES
4 Eyes Skin Assessment     NAME:  Dario Ohara  YOB: 1940  MEDICAL RECORD NUMBER:  [id number]    The patient is being assessed for  Admission    I agree that at least one RN has performed a thorough Head to Toe Skin Assessment on the patient. ALL assessment sites listed below have been assessed. Areas assessed by both nurses:    Head, Face, Ears, Shoulders, Back, Chest, Arms, Elbows, Hands, Sacrum. Buttock, Coccyx, Ischium, Legs. Feet and Heels, and Under Medical Devices         Does the Patient have a Wound?  No noted wound(s)       Chung Prevention initiated by RN: Yes  Wound Care Orders initiated by RN: No    Pressure Injury (Stage 3,4, Unstageable, DTI, NWPT, and Complex wounds) if present, place Wound referral order by RN under : No    New Ostomies, if present place, Ostomy referral order under : No     Nurse 1 eSignature: Electronically signed by Radonna Cooks, RN on 11/9/23 at 6:04 PM EST    **SHARE this note so that the co-signing nurse can place an eSignature**    Nurse 2 eSignature: {Esignature:[id number]}

## 2023-11-09 NOTE — PROGRESS NOTES
Patient's Daughter Joann Stone, who is patient's Healthcare POA, expressed wishes of making patient a DNR-CC, states that patient has a Living Will on file that expresses such wishes. Patient is unable to express wishes, as patient is alert and oriented to person only. Nursing staff verified that Living Will is on file, and that it states such information. April ANDREWS Gambino notified. New orders placed.

## 2023-11-09 NOTE — PROGRESS NOTES
[facility]     Episodic Visit       Ricardo Nava  : 1940    Visit Date: 2023  Facility:  [facility], [facility] [address]      CC: Fatigue, lethargy, N/V      The patient is known to me. HPI:    Ricardo Nava is a 78 y.o. female with a past medical history of atrial fibrillation on Eliquis, CVA, DVTs, HLD, HTN who was recently admitted to [facility] on 10/30 after sustaining a fall tripping in her home. During the fall she sustained a left subcapital fracture of the femoral neck. Orthopedic surgery was consulted and it was recommended to undergo a L hip hemiarthroplasty which was completed on 10/31. Patient was seen and evaluated by physical and Occupational Therapy which recommended discharge to [facility] facility for further rehab. Patient was discharged to [address] [place] [address] on  to continue physical and Occupational Therapy. Patient was seen and evaluated out in the common area in her wheelchair. Her daughter was visiting with her expressing ongoing concern for her mother's condition at this time. On assessment patient is somnolent and appears ill. During assessment patient begins having episodes of emesis and worsening confusion. When patient was seen earlier this week orders for a UA and culture and sensitivity were ordered however urine had not been obtained on assessment today and given her worsen presentation patient was sent to the emergency room to be further evaluated.      History Reviewed:     Past Medical History:   Diagnosis Date    Arthritis     Atrial fibrillation ([address])     Carotid stenosis, right     Cerebral artery occlusion with cerebral infarction (HCC)     MEMORY ISSUES, GAIT ISSUES    Glaucoma     History of blood transfusion     rupture appendicitis    Hx of blood clots     developed in leg after appendectomy age [age] [facility] yrs     Hyperlipidemia     Hypertension     IBS (irritable bowel syndrome)     Nausea     Nosebleed 2019    in eor dr Phyliss Apley

## 2023-11-10 LAB
ALBUMIN SERPL-MCNC: 2.6 G/DL (ref 3.5–5.2)
ALP SERPL-CCNC: 84 U/L (ref 35–104)
ALT SERPL-CCNC: 15 U/L (ref 0–32)
ANION GAP SERPL CALCULATED.3IONS-SCNC: 8 MMOL/L (ref 7–16)
AST SERPL-CCNC: 29 U/L (ref 0–31)
B PARAP IS1001 DNA NPH QL NAA+NON-PROBE: NOT DETECTED
B PERT DNA SPEC QL NAA+PROBE: NOT DETECTED
BASOPHILS # BLD: 0.05 K/UL (ref 0–0.2)
BASOPHILS NFR BLD: 1 % (ref 0–2)
BILIRUB DIRECT SERPL-MCNC: 0.3 MG/DL (ref 0–0.3)
BILIRUB INDIRECT SERPL-MCNC: 0.7 MG/DL (ref 0–1)
BILIRUB SERPL-MCNC: 1 MG/DL (ref 0–1.2)
BUN SERPL-MCNC: 26 MG/DL (ref 6–23)
C PNEUM DNA NPH QL NAA+NON-PROBE: NOT DETECTED
CALCIUM SERPL-MCNC: 7.7 MG/DL (ref 8.6–10.2)
CHLORIDE SERPL-SCNC: 106 MMOL/L (ref 98–107)
CO2 SERPL-SCNC: 24 MMOL/L (ref 22–29)
CREAT SERPL-MCNC: 1.1 MG/DL (ref 0.5–1)
EOSINOPHIL # BLD: 0.14 K/UL (ref 0.05–0.5)
EOSINOPHILS RELATIVE PERCENT: 2 % (ref 0–6)
ERYTHROCYTE [DISTWIDTH] IN BLOOD BY AUTOMATED COUNT: 15.6 % (ref 11.5–15)
FLUAV RNA NPH QL NAA+NON-PROBE: NOT DETECTED
FLUBV RNA NPH QL NAA+NON-PROBE: NOT DETECTED
GFR SERPL CREATININE-BSD FRML MDRD: 48 ML/MIN/1.73M2
GLUCOSE SERPL-MCNC: 100 MG/DL (ref 74–99)
HADV DNA NPH QL NAA+NON-PROBE: NOT DETECTED
HCOV 229E RNA NPH QL NAA+NON-PROBE: NOT DETECTED
HCOV HKU1 RNA NPH QL NAA+NON-PROBE: NOT DETECTED
HCOV NL63 RNA NPH QL NAA+NON-PROBE: NOT DETECTED
HCOV OC43 RNA NPH QL NAA+NON-PROBE: NOT DETECTED
HCT VFR BLD AUTO: 29.1 % (ref 34–48)
HGB BLD-MCNC: 9.3 G/DL (ref 11.5–15.5)
HMPV RNA NPH QL NAA+NON-PROBE: NOT DETECTED
HPIV1 RNA NPH QL NAA+NON-PROBE: NOT DETECTED
HPIV2 RNA NPH QL NAA+NON-PROBE: NOT DETECTED
HPIV3 RNA NPH QL NAA+NON-PROBE: NOT DETECTED
HPIV4 RNA NPH QL NAA+NON-PROBE: NOT DETECTED
IMM GRANULOCYTES # BLD AUTO: 0.06 K/UL (ref 0–0.58)
IMM GRANULOCYTES NFR BLD: 1 % (ref 0–5)
LYMPHOCYTES NFR BLD: 0.76 K/UL (ref 1.5–4)
LYMPHOCYTES RELATIVE PERCENT: 11 % (ref 20–42)
M PNEUMO DNA NPH QL NAA+NON-PROBE: NOT DETECTED
MCH RBC QN AUTO: 28.2 PG (ref 26–35)
MCHC RBC AUTO-ENTMCNC: 32 G/DL (ref 32–34.5)
MCV RBC AUTO: 88.2 FL (ref 80–99.9)
MONOCYTES NFR BLD: 0.5 K/UL (ref 0.1–0.95)
MONOCYTES NFR BLD: 7 % (ref 2–12)
NEUTROPHILS NFR BLD: 78 % (ref 43–80)
NEUTS SEG NFR BLD: 5.26 K/UL (ref 1.8–7.3)
PLATELET # BLD AUTO: 217 K/UL (ref 130–450)
PMV BLD AUTO: 9.5 FL (ref 7–12)
POTASSIUM SERPL-SCNC: 3.9 MMOL/L (ref 3.5–5)
PROT SERPL-MCNC: 5.3 G/DL (ref 6.4–8.3)
RBC # BLD AUTO: 3.3 M/UL (ref 3.5–5.5)
RSV RNA NPH QL NAA+NON-PROBE: NOT DETECTED
RV+EV RNA NPH QL NAA+NON-PROBE: NOT DETECTED
SARS-COV-2 RNA NPH QL NAA+NON-PROBE: NOT DETECTED
SODIUM SERPL-SCNC: 138 MMOL/L (ref 132–146)
SPECIMEN DESCRIPTION: NORMAL
WBC OTHER # BLD: 6.8 K/UL (ref 4.5–11.5)

## 2023-11-10 PROCEDURE — 85025 COMPLETE CBC W/AUTO DIFF WBC: CPT

## 2023-11-10 PROCEDURE — 97535 SELF CARE MNGMENT TRAINING: CPT

## 2023-11-10 PROCEDURE — 1200000000 HC SEMI PRIVATE

## 2023-11-10 PROCEDURE — 6360000002 HC RX W HCPCS: Performed by: FAMILY MEDICINE

## 2023-11-10 PROCEDURE — P9047 ALBUMIN (HUMAN), 25%, 50ML: HCPCS | Performed by: FAMILY MEDICINE

## 2023-11-10 PROCEDURE — 97530 THERAPEUTIC ACTIVITIES: CPT

## 2023-11-10 PROCEDURE — 97161 PT EVAL LOW COMPLEX 20 MIN: CPT

## 2023-11-10 PROCEDURE — 0202U NFCT DS 22 TRGT SARS-COV-2: CPT

## 2023-11-10 PROCEDURE — 6370000000 HC RX 637 (ALT 250 FOR IP): Performed by: NURSE PRACTITIONER

## 2023-11-10 PROCEDURE — 82248 BILIRUBIN DIRECT: CPT

## 2023-11-10 PROCEDURE — 80053 COMPREHEN METABOLIC PANEL: CPT

## 2023-11-10 PROCEDURE — 97165 OT EVAL LOW COMPLEX 30 MIN: CPT

## 2023-11-10 PROCEDURE — 6360000002 HC RX W HCPCS: Performed by: NURSE PRACTITIONER

## 2023-11-10 PROCEDURE — 2580000003 HC RX 258: Performed by: NURSE PRACTITIONER

## 2023-11-10 RX ORDER — ALBUMIN (HUMAN) 12.5 G/50ML
25 SOLUTION INTRAVENOUS EVERY 8 HOURS
Status: COMPLETED | OUTPATIENT
Start: 2023-11-10 | End: 2023-11-10

## 2023-11-10 RX ADMIN — ALBUMIN (HUMAN) 25 G: 0.25 INJECTION, SOLUTION INTRAVENOUS at 18:28

## 2023-11-10 RX ADMIN — AMLODIPINE BESYLATE 5 MG: 5 TABLET ORAL at 21:27

## 2023-11-10 RX ADMIN — WATER 1000 MG: 1 INJECTION INTRAMUSCULAR; INTRAVENOUS; SUBCUTANEOUS at 18:24

## 2023-11-10 RX ADMIN — APIXABAN 2.5 MG: 2.5 TABLET, FILM COATED ORAL at 21:27

## 2023-11-10 RX ADMIN — ALBUMIN (HUMAN) 25 G: 0.25 INJECTION, SOLUTION INTRAVENOUS at 11:24

## 2023-11-10 RX ADMIN — METOPROLOL SUCCINATE 50 MG: 50 TABLET, EXTENDED RELEASE ORAL at 09:19

## 2023-11-10 RX ADMIN — SODIUM CHLORIDE: 9 INJECTION, SOLUTION INTRAVENOUS at 08:40

## 2023-11-10 RX ADMIN — APIXABAN 2.5 MG: 2.5 TABLET, FILM COATED ORAL at 09:19

## 2023-11-10 RX ADMIN — METOPROLOL SUCCINATE 50 MG: 50 TABLET, EXTENDED RELEASE ORAL at 21:27

## 2023-11-10 RX ADMIN — Medication 10 ML: at 11:24

## 2023-11-10 RX ADMIN — Medication 10 ML: at 21:27

## 2023-11-10 NOTE — PLAN OF CARE
Problem: ABCDS Injury Assessment  Goal: Absence of physical injury  11/10/2023 1022 by Jim Soler RN  Outcome: Progressing  11/10/2023 0337 by Cheli Salvador RN  Outcome: Progressing     Problem: Skin/Tissue Integrity  Goal: Absence of new skin breakdown  Description: 1. Monitor for areas of redness and/or skin breakdown  2. Assess vascular access sites hourly  3. Every 4-6 hours minimum:  Change oxygen saturation probe site  4. Every 4-6 hours:  If on nasal continuous positive airway pressure, respiratory therapy assess nares and determine need for appliance change or resting period.   11/10/2023 1022 by Jim Soler RN  Outcome: Progressing  11/10/2023 0337 by Cheli Salvador RN  Outcome: Progressing     Problem: Discharge Planning  Goal: Discharge to home or other facility with appropriate resources  11/10/2023 0337 by Cheli Salvador RN  Outcome: Progressing  Flowsheets  Taken 11/10/2023 0103 by Cheli Salvador RN  Discharge to home or other facility with appropriate resources: Refer to discharge planning if patient needs post-hospital services based on physician order or complex needs related to functional status, cognitive ability or social support system  Taken 11/9/2023 1536 by Tanner Porter RN  Discharge to home or other facility with appropriate resources: Refer to discharge planning if patient needs post-hospital services based on physician order or complex needs related to functional status, cognitive ability or social support system     Problem: Safety - Adult  Goal: Free from fall injury  11/10/2023 0337 by Cheli Salvador RN  Outcome: Progressing

## 2023-11-10 NOTE — H&P
[facility]  History and Physical      CHIEF COMPLAINT:    Chief Complaint   Patient presents with    Altered Mental Status     Pt sent in from [facility] for increased confusion, pt a/ox2 at baseline, increased delirium over the past 24 hours, recent L hip surgery         Patient of Nanci Gastelum MD presents with:  UTI (urinary tract infection)    History of Present Illness:   Patient is a 49-year-old female with a past medical history of arthritis, A-fib, carotid stenosis, cerebral artery occlusion, glaucoma, hyperlipidemia, hypertension, IBS, nausea, nosebleeds, and UTI. Patient presented to the ED from facility for increased confusion and concerns for blood pressure. Patient recently had a left hip surgery. Facility was concerned over the past day and a half the patient was seen talking to herself. Patient's baseline is alert and oriented x2. Facility was concerned that she had increased urine output over the past 2 days. Patient does have a history of a stroke and is on Eliquis. ER work-up revealed elevated BUN/creatinine 31/1.4, calcium 7.9, elevated liver enzymes with a bilirubin of 1.9, WBC 12.4, urinalysis slight leukocytes, imaging negative. Patient was admitted to [address] [place] [address] [address] unit for further treatment. REVIEW OF SYSTEMS:  Pertinent negatives are above in HPI. 10 point ROS otherwise negative.       Past Medical History:   Diagnosis Date    Arthritis     Atrial fibrillation ([address])     Carotid stenosis, right     Cerebral artery occlusion with cerebral infarction (HCC)     MEMORY ISSUES, GAIT ISSUES    Glaucoma     History of blood transfusion 1953    rupture appendicitis    Hx of blood clots     developed in leg after appendectomy age 13 yrs     Hyperlipidemia     Hypertension     IBS (irritable bowel syndrome)     Nausea     Nosebleed 08/09/2019    in eor dr Dung Joseph left nostril packing    UTI (lower urinary tract infection)          Past Surgical History:   Procedure Laterality

## 2023-11-10 NOTE — PROGRESS NOTES
[address], [address] [place] [address]        DNEZ:                                                  Patient Name: Ty Dennis    MRN: [id number]    : 1940    Room: [address] [place] [address]     Evaluating OT: Martine Youssef OTR/L #[id number]    Referring Provider:  Mirtha Suarez MD     Specific Provider Orders/Date:  OT Eval and Treat, 23     Diagnosis:   1. Sepsis, due to unspecified organism, unspecified whether acute organ dysfunction present ([address])    2. Urinary tract infection without hematuria, site unspecified    3.  Acute kidney injury [facility])         Surgery: None      Pertinent Medical History: HTN, A-fib, HLD, CVA, glaucoma, left hip hemiarthroplasty 10/31/23       Precautions:  Fall Risk, alarm, weight bearing as tolerated L LE, anterolateral hip precautions      Assessment of current deficits    [x] Functional mobility  [x]ADLs  [x] Strength               [x]Cognition    [x] Functional transfers   [x] IADLs         [x] Safety Awareness   [x]Endurance    [] Fine Coordination              [x] Balance      [] Vision/perception   []Sensation     []Gross Motor Coordination  [] ROM  [] Delirium                   [] Motor Control     OT PLAN OF CARE   OT POC based on physician orders, patient diagnosis and results of clinical assessment    Frequency/Duration 2-5 days/wk for 2 weeks PRN     Specific OT Treatment Interventions to include:   * Instruction/training on adapted ADL techniques and AE recommendations to increase functional independence within precautions       * Training on energy conservation strategies, correct breathing pattern and techniques to improve independence/tolerance for self-care routine  * Functional transfer/mobility training/DME recommendations for increased independence, safety, and fall prevention  * Patient/Family education to increase follow through with safety techniques

## 2023-11-10 NOTE — CARE COORDINATION
R/o COVID. Pt admitted for UTI/AMS s/p left hip arthroplasty fro [facility]. Uses ww/w/c at facility. Spoke to daughter Jing [place]  [facility]), does not want pt to return. Request referral be made to [facility], as Johnson Hannah, pt's PCP services that facility. Referral made to [facility] who will review and check bed availability. 2nd choice would be [facility]. Transport forms on chart. Will need to transfer [id number] to accepting facility at discharge. Iv rocephin for UTI.will await decision and follow. Addi Manning.

## 2023-11-10 NOTE — PROGRESS NOTES
Physical Therapy  Facility/Department: [facility] SURG/TELE  Physical Therapy Initial Assessment    Name: Ricardo Nava  : 1940  MRN: [id number]  Date of Service: 11/10/2023      Attending Provider:  Navin Kay DO    Evaluating PT:  Lacy Kern P.T. Room #:  8133/0935-D  Diagnosis:  UTI (urinary tract infection) [N39.0]  Pertinent PMHx/PSHx:  10/31/23 L hip hemiarthroplasty, CVA with memory issues  Precautions: WBAT LLE, anterolateral L hip precautions, falls, bed/chair alarm, droplet+ isolation     SUBJECTIVE:    Pt lives with her daughter in a 1 story home with 2+1 stairs and no rail to enter. Pt came in from [facility] and was ambulating short distances with a ww PTA. OBJECTIVE:   Initial Evaluation  Date: 11/10/23 Treatment Short Term/ Long Term   Goals   Was pt agreeable to Eval/treatment? yes     Does pt have pain? No c/o pain     Bed Mobility  Rolling: SBA  Supine to sit: MIN A  Sit to supine: MIN A  Scooting: MIN A to EOB  supervision   Transfers Sit to stand: MOD A  Stand to sit: MIN A  Stand pivot: MIN A with ww  SBA   Ambulation   10 feet with ww MIN A  100 feet with ww SBA   Stair negotiation: ascended and descended NA  2 steps with ww CGA   AM-PAC 6 Clicks        BLE ROM is WFL. BLE strength is grossly 3-/5 to 4/5.    Balance: sitting is SBA and standing with ww is MIN A  Endurance: fair-    Patient education  Pt educated on hand placement during transfers and gait sequence with ww    Patient response to education:   Pt verbalized understanding Pt demonstrated skill Pt requires further education in this area   yes yes yes     ASSESSMENT:    Conditions Requiring Skilled Therapeutic Intervention:    [x]Decreased strength     []Decreased ROM  [x]Decreased functional mobility  [x]Decreased balance   [x]Decreased endurance   []Decreased posture  []Decreased sensation  []Decreased coordination   []Decreased vision  []Decreased safety awareness   []Increased pain thorough review of current medical information, gathering information on past medical history/social history and prior level of function, completion of standardized testing/informal observation of tasks, assessment of data and education on plan of care and goals. CPT codes:  [x] Low Complexity PT evaluation 56354  [] Moderate Complexity PT evaluation 49327  [] High Complexity PT evaluation 19333  [] PT Re-evaluation 80772  [] Gait training 37668 ** minutes  [] Manual therapy 85499 ** minutes  [x] Therapeutic activities 54522 10 minutes  [] Therapeutic exercises 55153 ** minutes  [] Neuromuscular reeducation 08868 ** minutes     Kai Martinez., P.T.   License Number: [id number]

## 2023-11-10 NOTE — PLAN OF CARE
Problem: ABCDS Injury Assessment  Goal: Absence of physical injury  Outcome: Progressing     Problem: Skin/Tissue Integrity  Goal: Absence of new skin breakdown  Description: 1. Monitor for areas of redness and/or skin breakdown  2. Assess vascular access sites hourly  3. Every 4-6 hours minimum:  Change oxygen saturation probe site  4. Every 4-6 hours:  If on nasal continuous positive airway pressure, respiratory therapy assess nares and determine need for appliance change or resting period.   Outcome: Progressing     Problem: Discharge Planning  Goal: Discharge to home or other facility with appropriate resources  Outcome: Progressing  Flowsheets  Taken 11/10/2023 0103 by Dionne Deluna RN  Discharge to home or other facility with appropriate resources: Refer to discharge planning if patient needs post-hospital services based on physician order or complex needs related to functional status, cognitive ability or social support system  Taken 11/9/2023 1536 by Brigida Donahue RN  Discharge to home or other facility with appropriate resources: Refer to discharge planning if patient needs post-hospital services based on physician order or complex needs related to functional status, cognitive ability or social support system     Problem: Safety - Adult  Goal: Free from fall injury  Outcome: Progressing

## 2023-11-11 LAB
ANION GAP SERPL CALCULATED.3IONS-SCNC: 11 MMOL/L (ref 7–16)
BASOPHILS # BLD: 0.05 K/UL (ref 0–0.2)
BASOPHILS NFR BLD: 1 % (ref 0–2)
BUN SERPL-MCNC: 19 MG/DL (ref 6–23)
CALCIUM SERPL-MCNC: 8.5 MG/DL (ref 8.6–10.2)
CHLORIDE SERPL-SCNC: 102 MMOL/L (ref 98–107)
CO2 SERPL-SCNC: 23 MMOL/L (ref 22–29)
CREAT SERPL-MCNC: 0.9 MG/DL (ref 0.5–1)
EOSINOPHIL # BLD: 0.22 K/UL (ref 0.05–0.5)
EOSINOPHILS RELATIVE PERCENT: 3 % (ref 0–6)
ERYTHROCYTE [DISTWIDTH] IN BLOOD BY AUTOMATED COUNT: 15.4 % (ref 11.5–15)
GFR SERPL CREATININE-BSD FRML MDRD: >60 ML/MIN/1.73M2
GLUCOSE SERPL-MCNC: 92 MG/DL (ref 74–99)
HCT VFR BLD AUTO: 29.2 % (ref 34–48)
HGB BLD-MCNC: 9.3 G/DL (ref 11.5–15.5)
IMM GRANULOCYTES # BLD AUTO: 0.05 K/UL (ref 0–0.58)
IMM GRANULOCYTES NFR BLD: 1 % (ref 0–5)
LYMPHOCYTES NFR BLD: 0.87 K/UL (ref 1.5–4)
LYMPHOCYTES RELATIVE PERCENT: 12 % (ref 20–42)
MAGNESIUM SERPL-MCNC: 2.2 MG/DL (ref 1.6–2.6)
MCH RBC QN AUTO: 28 PG (ref 26–35)
MCHC RBC AUTO-ENTMCNC: 31.8 G/DL (ref 32–34.5)
MCV RBC AUTO: 88 FL (ref 80–99.9)
MICROORGANISM SPEC CULT: NO GROWTH
MONOCYTES NFR BLD: 0.53 K/UL (ref 0.1–0.95)
MONOCYTES NFR BLD: 7 % (ref 2–12)
NEUTROPHILS NFR BLD: 77 % (ref 43–80)
NEUTS SEG NFR BLD: 5.8 K/UL (ref 1.8–7.3)
PLATELET # BLD AUTO: 215 K/UL (ref 130–450)
PMV BLD AUTO: 9.5 FL (ref 7–12)
POTASSIUM SERPL-SCNC: 3.4 MMOL/L (ref 3.5–5)
RBC # BLD AUTO: 3.32 M/UL (ref 3.5–5.5)
SODIUM SERPL-SCNC: 136 MMOL/L (ref 132–146)
SPECIMEN DESCRIPTION: NORMAL
WBC OTHER # BLD: 7.5 K/UL (ref 4.5–11.5)

## 2023-11-11 PROCEDURE — 83735 ASSAY OF MAGNESIUM: CPT

## 2023-11-11 PROCEDURE — 85025 COMPLETE CBC W/AUTO DIFF WBC: CPT

## 2023-11-11 PROCEDURE — 6370000000 HC RX 637 (ALT 250 FOR IP): Performed by: NURSE PRACTITIONER

## 2023-11-11 PROCEDURE — 80048 BASIC METABOLIC PNL TOTAL CA: CPT

## 2023-11-11 PROCEDURE — 2580000003 HC RX 258: Performed by: NURSE PRACTITIONER

## 2023-11-11 PROCEDURE — 6360000002 HC RX W HCPCS: Performed by: NURSE PRACTITIONER

## 2023-11-11 PROCEDURE — 1200000000 HC SEMI PRIVATE

## 2023-11-11 RX ADMIN — Medication 10 ML: at 22:34

## 2023-11-11 RX ADMIN — WATER 1000 MG: 1 INJECTION INTRAMUSCULAR; INTRAVENOUS; SUBCUTANEOUS at 22:32

## 2023-11-11 RX ADMIN — METOPROLOL SUCCINATE 50 MG: 50 TABLET, EXTENDED RELEASE ORAL at 23:44

## 2023-11-11 RX ADMIN — METOPROLOL SUCCINATE 50 MG: 50 TABLET, EXTENDED RELEASE ORAL at 08:39

## 2023-11-11 RX ADMIN — Medication 10 ML: at 08:39

## 2023-11-11 RX ADMIN — AMLODIPINE BESYLATE 5 MG: 5 TABLET ORAL at 22:35

## 2023-11-11 RX ADMIN — APIXABAN 2.5 MG: 2.5 TABLET, FILM COATED ORAL at 22:32

## 2023-11-11 RX ADMIN — APIXABAN 2.5 MG: 2.5 TABLET, FILM COATED ORAL at 08:38

## 2023-11-11 ASSESSMENT — PAIN SCALES - GENERAL
PAINLEVEL_OUTOF10: 0

## 2023-11-11 NOTE — PLAN OF CARE
Problem: ABCDS Injury Assessment  Goal: Absence of physical injury  11/10/2023 2307 by Sienna Parisi RN  Outcome: Progressing  11/10/2023 1022 by Chloé Raymundo RN  Outcome: Progressing     Problem: Skin/Tissue Integrity  Goal: Absence of new skin breakdown  Description: 1. Monitor for areas of redness and/or skin breakdown  2. Assess vascular access sites hourly  3. Every 4-6 hours minimum:  Change oxygen saturation probe site  4. Every 4-6 hours:  If on nasal continuous positive airway pressure, respiratory therapy assess nares and determine need for appliance change or resting period.   11/10/2023 2307 by Sienna Parisi RN  Outcome: Progressing  11/10/2023 1022 by Chloé Raymundo RN  Outcome: Progressing     Problem: Discharge Planning  Goal: Discharge to home or other facility with appropriate resources  Outcome: Progressing  Flowsheets (Taken 11/10/2023 2253)  Discharge to home or other facility with appropriate resources: Refer to discharge planning if patient needs post-hospital services based on physician order or complex needs related to functional status, cognitive ability or social support system     Problem: Safety - Adult  Goal: Free from fall injury  Outcome: Progressing

## 2023-11-11 NOTE — CONSULTS
Comprehensive Nutrition Assessment    Type and Reason for Visit:  Initial, Consult    Nutrition Recommendations/Plan:   Continue current diet & ONS. Monitor. Malnutrition Assessment:  Malnutrition Status: At risk for malnutrition (Comment) (11/11/23 5608)    Context:  Acute Illness     Findings of the 6 clinical characteristics of malnutrition:  Energy Intake:  Mild decrease in energy intake (Comment)  Weight Loss:  Unable to assess (d/t lack of wt hx per EMR)     Body Fat Loss:  Unable to assess (d/t advanced age)     Muscle Mass Loss:  Unable to assess (d/t advanced age)    Fluid Accumulation:  Unable to assess (d/t multifactorial)     Strength:  Not Performed    Nutrition Assessment:    Pt presents from SNF w/ increased confusion, admits w/ UTI. Hx CVA, recent hx left hip hemiarthroplasty 10/31. Meal intake appears <50%, continue ONS to optimize nutrient intake & monitor. Nutrition Related Findings:    A&O X1, I&Os WDL, +1-2 edema, abd WDL, K+ 3.4   Wound Type: Surgical Incision       Current Nutrition Intake & Therapies:    Average Meal Intake: 0%, 26-50%  Average Supplements Intake: %  ADULT DIET; Regular; Low Fat/Low Chol/High Fiber/CEDRICK  ADULT ORAL NUTRITION SUPPLEMENT; Breakfast, Dinner; Standard High Calorie/High Protein Oral Supplement    Anthropometric Measures:  Height: 157.5 cm (5' 2\")  Ideal Body Weight (IBW): 110 lbs (50 kg)    Admission Body Weight: 67.6 kg (149 lb) (11/10 bed)  Current Body Weight: 64.1 kg (141 lb 5 oz) (11/11), 128.5 % IBW. Weight Source: Bed Scale  Current BMI (kg/m2): 25.8  Usual Body Weight:  (no actual EMR wt hx <1 year)                       BMI Categories: Overweight (BMI 25.0-29. 9)    Estimated Daily Nutrient Needs:  Energy Requirements Based On: Formula  Weight Used for Energy Requirements: Current  Energy (kcal/day):   Weight Used for Protein Requirements: Ideal  Protein (g/day): 60-70 (1.2-1.4)  Method Used for Fluid Requirements: 1 ml/kcal  Fluid

## 2023-11-11 NOTE — PLAN OF CARE
Problem: ABCDS Injury Assessment  Goal: Absence of physical injury  11/11/2023 1057 by Sergio Lynch RN  Outcome: Progressing  Flowsheets (Taken 11/11/2023 1054)  Absence of Physical Injury: Implement safety measures based on patient assessment  11/10/2023 2307 by Byron Morel RN  Outcome: Progressing     Problem: Skin/Tissue Integrity  Goal: Absence of new skin breakdown  Description: 1. Monitor for areas of redness and/or skin breakdown  2. Assess vascular access sites hourly  3. Every 4-6 hours minimum:  Change oxygen saturation probe site  4. Every 4-6 hours:  If on nasal continuous positive airway pressure, respiratory therapy assess nares and determine need for appliance change or resting period.   11/11/2023 1057 by Sergio Lynch RN  Outcome: Progressing  11/10/2023 2307 by Byron Morel RN  Outcome: Progressing     Problem: Discharge Planning  Goal: Discharge to home or other facility with appropriate resources  11/11/2023 1057 by Sergio Lynch RN  Outcome: Progressing  Flowsheets (Taken 11/11/2023 6941)  Discharge to home or other facility with appropriate resources: Refer to discharge planning if patient needs post-hospital services based on physician order or complex needs related to functional status, cognitive ability or social support system  11/10/2023 2307 by Byron Morel RN  Outcome: Progressing  Flowsheets (Taken 11/10/2023 2253)  Discharge to home or other facility with appropriate resources: Refer to discharge planning if patient needs post-hospital services based on physician order or complex needs related to functional status, cognitive ability or social support system     Problem: Safety - Adult  Goal: Free from fall injury  11/11/2023 1057 by Sergio Lynch RN  Outcome: Progressing  11/10/2023 2307 by Byron Morel RN  Outcome: Progressing

## 2023-11-12 LAB
ANION GAP SERPL CALCULATED.3IONS-SCNC: 11 MMOL/L (ref 7–16)
BASOPHILS # BLD: 0.04 K/UL (ref 0–0.2)
BASOPHILS NFR BLD: 1 % (ref 0–2)
BUN SERPL-MCNC: 19 MG/DL (ref 6–23)
CALCIUM SERPL-MCNC: 8.5 MG/DL (ref 8.6–10.2)
CHLORIDE SERPL-SCNC: 103 MMOL/L (ref 98–107)
CO2 SERPL-SCNC: 24 MMOL/L (ref 22–29)
CREAT SERPL-MCNC: 0.8 MG/DL (ref 0.5–1)
EOSINOPHIL # BLD: 0.21 K/UL (ref 0.05–0.5)
EOSINOPHILS RELATIVE PERCENT: 2 % (ref 0–6)
ERYTHROCYTE [DISTWIDTH] IN BLOOD BY AUTOMATED COUNT: 15.5 % (ref 11.5–15)
GFR SERPL CREATININE-BSD FRML MDRD: >60 ML/MIN/1.73M2
GLUCOSE SERPL-MCNC: 104 MG/DL (ref 74–99)
HCT VFR BLD AUTO: 31.2 % (ref 34–48)
HGB BLD-MCNC: 9.9 G/DL (ref 11.5–15.5)
IMM GRANULOCYTES # BLD AUTO: 0.06 K/UL (ref 0–0.58)
IMM GRANULOCYTES NFR BLD: 1 % (ref 0–5)
LYMPHOCYTES NFR BLD: 0.87 K/UL (ref 1.5–4)
LYMPHOCYTES RELATIVE PERCENT: 10 % (ref 20–42)
MCH RBC QN AUTO: 27.8 PG (ref 26–35)
MCHC RBC AUTO-ENTMCNC: 31.7 G/DL (ref 32–34.5)
MCV RBC AUTO: 87.6 FL (ref 80–99.9)
MONOCYTES NFR BLD: 0.66 K/UL (ref 0.1–0.95)
MONOCYTES NFR BLD: 7 % (ref 2–12)
NEUTROPHILS NFR BLD: 79 % (ref 43–80)
NEUTS SEG NFR BLD: 7.04 K/UL (ref 1.8–7.3)
PLATELET # BLD AUTO: 238 K/UL (ref 130–450)
PMV BLD AUTO: 9.2 FL (ref 7–12)
POTASSIUM SERPL-SCNC: 3.6 MMOL/L (ref 3.5–5)
RBC # BLD AUTO: 3.56 M/UL (ref 3.5–5.5)
SODIUM SERPL-SCNC: 138 MMOL/L (ref 132–146)
WBC OTHER # BLD: 8.9 K/UL (ref 4.5–11.5)

## 2023-11-12 PROCEDURE — 6370000000 HC RX 637 (ALT 250 FOR IP): Performed by: NURSE PRACTITIONER

## 2023-11-12 PROCEDURE — 1200000000 HC SEMI PRIVATE

## 2023-11-12 PROCEDURE — 2580000003 HC RX 258: Performed by: NURSE PRACTITIONER

## 2023-11-12 PROCEDURE — 6360000002 HC RX W HCPCS: Performed by: NURSE PRACTITIONER

## 2023-11-12 PROCEDURE — 85025 COMPLETE CBC W/AUTO DIFF WBC: CPT

## 2023-11-12 PROCEDURE — 80048 BASIC METABOLIC PNL TOTAL CA: CPT

## 2023-11-12 PROCEDURE — 6370000000 HC RX 637 (ALT 250 FOR IP): Performed by: FAMILY MEDICINE

## 2023-11-12 RX ORDER — LANOLIN ALCOHOL/MO/W.PET/CERES
6 CREAM (GRAM) TOPICAL NIGHTLY
Status: DISCONTINUED | OUTPATIENT
Start: 2023-11-12 | End: 2023-11-17 | Stop reason: HOSPADM

## 2023-11-12 RX ADMIN — SODIUM CHLORIDE, PRESERVATIVE FREE 10 ML: 5 INJECTION INTRAVENOUS at 17:44

## 2023-11-12 RX ADMIN — Medication 6 MG: at 20:49

## 2023-11-12 RX ADMIN — WATER 1000 MG: 1 INJECTION INTRAMUSCULAR; INTRAVENOUS; SUBCUTANEOUS at 17:35

## 2023-11-12 RX ADMIN — Medication 10 ML: at 19:19

## 2023-11-12 RX ADMIN — APIXABAN 2.5 MG: 2.5 TABLET, FILM COATED ORAL at 08:29

## 2023-11-12 RX ADMIN — AMLODIPINE BESYLATE 5 MG: 5 TABLET ORAL at 20:50

## 2023-11-12 RX ADMIN — Medication 10 ML: at 08:32

## 2023-11-12 RX ADMIN — METOPROLOL SUCCINATE 50 MG: 50 TABLET, EXTENDED RELEASE ORAL at 08:29

## 2023-11-12 RX ADMIN — METOPROLOL SUCCINATE 50 MG: 50 TABLET, EXTENDED RELEASE ORAL at 22:29

## 2023-11-12 RX ADMIN — APIXABAN 2.5 MG: 2.5 TABLET, FILM COATED ORAL at 20:49

## 2023-11-12 ASSESSMENT — PAIN SCALES - GENERAL
PAINLEVEL_OUTOF10: 0

## 2023-11-12 NOTE — PLAN OF CARE
Problem: ABCDS Injury Assessment  Goal: Absence of physical injury  11/12/2023 0342 by Mesfin Ramos RN  Outcome: Progressing     Problem: Skin/Tissue Integrity  Goal: Absence of new skin breakdown  Description: 1. Monitor for areas of redness and/or skin breakdown  2. Assess vascular access sites hourly  3. Every 4-6 hours minimum:  Change oxygen saturation probe site  4. Every 4-6 hours:  If on nasal continuous positive airway pressure, respiratory therapy assess nares and determine need for appliance change or resting period.   11/12/2023 0342 by Mesfin Ramos RN  Outcome: Progressing     Problem: Discharge Planning  Goal: Discharge to home or other facility with appropriate resources  11/12/2023 0342 by Mesfin Ramos RN  Outcome: Progressing

## 2023-11-13 PROCEDURE — 6370000000 HC RX 637 (ALT 250 FOR IP): Performed by: NURSE PRACTITIONER

## 2023-11-13 PROCEDURE — 1200000000 HC SEMI PRIVATE

## 2023-11-13 PROCEDURE — 6360000002 HC RX W HCPCS: Performed by: NURSE PRACTITIONER

## 2023-11-13 PROCEDURE — 97530 THERAPEUTIC ACTIVITIES: CPT

## 2023-11-13 PROCEDURE — 6370000000 HC RX 637 (ALT 250 FOR IP): Performed by: FAMILY MEDICINE

## 2023-11-13 PROCEDURE — 97110 THERAPEUTIC EXERCISES: CPT

## 2023-11-13 PROCEDURE — 51798 US URINE CAPACITY MEASURE: CPT

## 2023-11-13 PROCEDURE — 2580000003 HC RX 258: Performed by: NURSE PRACTITIONER

## 2023-11-13 RX ADMIN — Medication 10 ML: at 20:29

## 2023-11-13 RX ADMIN — APIXABAN 2.5 MG: 2.5 TABLET, FILM COATED ORAL at 20:24

## 2023-11-13 RX ADMIN — METOPROLOL SUCCINATE 50 MG: 50 TABLET, EXTENDED RELEASE ORAL at 08:15

## 2023-11-13 RX ADMIN — AMLODIPINE BESYLATE 5 MG: 5 TABLET ORAL at 20:24

## 2023-11-13 RX ADMIN — Medication 6 MG: at 20:24

## 2023-11-13 RX ADMIN — APIXABAN 2.5 MG: 2.5 TABLET, FILM COATED ORAL at 08:15

## 2023-11-13 RX ADMIN — Medication 10 ML: at 08:15

## 2023-11-13 RX ADMIN — METOPROLOL SUCCINATE 50 MG: 50 TABLET, EXTENDED RELEASE ORAL at 20:24

## 2023-11-13 RX ADMIN — WATER 1000 MG: 1 INJECTION INTRAMUSCULAR; INTRAVENOUS; SUBCUTANEOUS at 16:51

## 2023-11-13 ASSESSMENT — PAIN SCALES - GENERAL: PAINLEVEL_OUTOF10: 0

## 2023-11-13 NOTE — PROGRESS NOTES
Occupational Therapy  OT BEDSIDE TREATMENT NOTE      Date:2023  Patient Name: Saroj Carmen  MRN: [id number]  : 1940  Room: [address] [place] [address]      Evaluating OT: Shelly Neil OTR/L #[id number]     Referring Provider:  Dago Estrella MD      Specific Provider Orders/Date:  OT Eval and Treat, 23      Diagnosis:   1. Sepsis, due to unspecified organism, unspecified whether acute organ dysfunction present ([address])    2. Urinary tract infection without hematuria, site unspecified    3.  Acute kidney injury [facility])         Surgery: None       Pertinent Medical History: HTN, A-fib, HLD, CVA, glaucoma, left hip hemiarthroplasty 10/31/23       Precautions:  Fall Risk, alarm, weight bearing as tolerated L LE, anterolateral hip precautions       Assessment of current deficits    [x] Functional mobility            [x]ADLs           [x] Strength                   [x]Cognition    [x] Functional transfers          [x] IADLs          [x] Safety Awareness   [x]Endurance    [] Fine Coordination              [x] Balance      [] Vision/perception    []Sensation      []Gross Motor Coordination  [] ROM           [] Delirium                   [] Motor Control      OT PLAN OF CARE   OT POC based on physician orders, patient diagnosis and results of clinical assessment     Frequency/Duration 2-5 days/wk for 2 weeks PRN      Specific OT Treatment Interventions to include:   * Instruction/training on adapted ADL techniques and AE recommendations to increase functional independence within precautions       * Training on energy conservation strategies, correct breathing pattern and techniques to improve independence/tolerance for self-care routine  * Functional transfer/mobility training/DME recommendations for increased independence, safety, and fall prevention  * Patient/Family education to increase follow through with safety techniques and functional independence  * Recommendation of environmental modifications for increased safety with Minimal Assist    Bathing Moderate/Maximal Assist   LB sponge bath at EOB       Minimal Assist    Toileting Dependent   Incontinent of B&B x 2 episodes upon standing from EOB . Hygiene care completed. dependent, incontinent of bowel and bladder, assist required for hygiene while standing Minimal Assist    Bed Mobility  Supine to sit: Moderate Assist   Sit to supine: Moderate Assist   Rolling: Moderate Assist     Supine to sit SBA  Supine to sit: Supervision   Sit to supine: Supervision    Functional Transfers Sit to stand: Moderate Assist   Stand to sit: Moderate Assist      Transfer training with verbal cues for hand placement to improve safety. Sit <> stand mod A  Supervision    Functional Mobility NT due to pt incontinent upon standing      Min to mod A with WW to window and back  SBA with use of wheeled walker       Balance Sitting:     Static: fair plus     Dynamic: fair/fair plus  Standing: fair minus with walker     Sitting balance SBA  Standing balance min A  Sitting:     Static: good    Dynamic: good  Standing: fair plus with walker    Activity Tolerance fair   fair  Increase standing tolerance >3  minutes for improved engagement with functional transfers and indep in ADLs        Comments:  patient cleared with nursing and agreeable to session. Pleasant and motivated. Patient fatigued but some improvement demonstrated today. Patient in chair with call light in reach and alarm on    Education/treatment: ADL and functional transfer/activity performed to increase safety and independence during self care tasks. Education provided on safety awareness, adl reeducation, functional transfer training. Pt has made progress towards set goals.      Time In: 9:12  Time Out: 9:27     Min Units   Therapeutic Ex 47218     Therapeutic Activities 35456 38 8   ADL/Self Care 95340     Orthotic Management 83288     Neuro Re-Ed 41488     Non-Billable Time     TOTAL TIMED TREATMENT 15 1       Kaitlyn HAMMONDS/ONEAL

## 2023-11-13 NOTE — PROGRESS NOTES
Physical Therapy  Facility/Department: [address] MED SURG/TELE  Physical Therapy Treatment Note    Name: Slade Tarango  : 1940  MRN: [id number]  Date of Service: 2023      Attending Provider:  Isaura Alexander MD    Evaluating PT:  Hemalatha Aguilera P.T. Room #:  7701/7627-C  Diagnosis:  UTI (urinary tract infection) [N39.0]  Pertinent PMHx/PSHx:  10/31/23 L hip hemiarthroplasty, CVA with memory issues  Precautions: WBAT LLE, anterolateral L hip precautions, falls, bed/chair alarm, droplet+ isolation     SUBJECTIVE:    Pt lives with her daughter in a 1 story home with 2+1 stairs and no rail to enter. Pt came in from [facility] and was ambulating short distances with a ww PTA. OBJECTIVE:   Initial Evaluation  Date: 11/10/23 Treatment  2023 Short Term/ Long Term   Goals   Was pt agreeable to Eval/treatment? yes yes    Does pt have pain? No c/o pain No complaints    Bed Mobility  Rolling: SBA  Supine to sit: MIN A  Sit to supine: MIN A  Scooting: MIN A to EOB Rolling: SBA  Supine to sit: SBA  Scooting: SBA seated to EOB supervision   Transfers Sit to stand: MOD A  Stand to sit: MIN A  Stand pivot: MIN A with ww Sit <> stand:  Mod A SBA   Ambulation   10 feet with ww MIN A 20 feet with [employer] Min/Mod A 100 feet with ww SBA   Stair negotiation: ascended and descended NA NT 2 steps with ww CGA   AM-PAC 6 Clicks 34/82 59/50      Pt is alert, following instruction  Balance: poor dynamic with [employer]    Pt performed therapeutic exercise of the following: supine B ankle pumps, quad/glut sets AROM; B heel slides, hip ABd/ADd and SAQ's A/AAROM x 20    Patient education/treatment  Pt was educated on therapeutic exercise for circulation/ROM/strengthening, UE usage for transfer safety, gait mechanics for posture/sequence and staying within [employer] base of support    Patient response to education:   Pt verbalized understanding Pt demonstrated skill Pt requires further education in this area   yes With instruction yes

## 2023-11-13 NOTE — CARE COORDINATION
Family now requesting #[facility](reviewed, declined) #2 [phone] [facility] (accepted, starting precert. )Chandni Mccray will obtain [phone] from [facility]. Await auth. Daughter Jl Jerry updated via . Reginaldo Liu. Daughter now agreeable  to [employer]; mac advised ; will initiate precert. Reginaldo Liu.

## 2023-11-14 PROCEDURE — 2580000003 HC RX 258: Performed by: NURSE PRACTITIONER

## 2023-11-14 PROCEDURE — 6370000000 HC RX 637 (ALT 250 FOR IP): Performed by: NURSE PRACTITIONER

## 2023-11-14 PROCEDURE — 1200000000 HC SEMI PRIVATE

## 2023-11-14 PROCEDURE — 6370000000 HC RX 637 (ALT 250 FOR IP): Performed by: FAMILY MEDICINE

## 2023-11-14 PROCEDURE — 97530 THERAPEUTIC ACTIVITIES: CPT

## 2023-11-14 RX ORDER — LANOLIN ALCOHOL/MO/W.PET/CERES
6 CREAM (GRAM) TOPICAL NIGHTLY
Refills: 3 | DISCHARGE
Start: 2023-11-14

## 2023-11-14 RX ADMIN — Medication 6 MG: at 20:39

## 2023-11-14 RX ADMIN — METOPROLOL SUCCINATE 50 MG: 50 TABLET, EXTENDED RELEASE ORAL at 08:15

## 2023-11-14 RX ADMIN — Medication 10 ML: at 20:40

## 2023-11-14 RX ADMIN — AMLODIPINE BESYLATE 5 MG: 5 TABLET ORAL at 20:39

## 2023-11-14 RX ADMIN — APIXABAN 2.5 MG: 2.5 TABLET, FILM COATED ORAL at 20:39

## 2023-11-14 RX ADMIN — Medication 10 ML: at 08:15

## 2023-11-14 RX ADMIN — METOPROLOL SUCCINATE 50 MG: 50 TABLET, EXTENDED RELEASE ORAL at 23:32

## 2023-11-14 RX ADMIN — APIXABAN 2.5 MG: 2.5 TABLET, FILM COATED ORAL at 08:15

## 2023-11-14 ASSESSMENT — PAIN SCALES - GENERAL: PAINLEVEL_OUTOF10: 0

## 2023-11-14 NOTE — CARE COORDINATION
Pt accepted to [address] [place] [address]; auth initiated 11/13; transport forms on chart. await auth. Prerna Suárez will obtain [phone] from [facility]. Blaise Valverde.

## 2023-11-14 NOTE — PROGRESS NOTES
Occupational Therapy  OT BEDSIDE TREATMENT NOTE      Date:2023  Patient Name: Stepan Shrot  MRN: [id number]  : 1940  Room: [address] [place] [address]      Evaluating OT: Naty Christiansen OTR/L #[id number]     Referring Provider:  Elisabeth Cadena MD      Specific Provider Orders/Date:  OT Eval and Treat, 23      Diagnosis:   1. Sepsis, due to unspecified organism, unspecified whether acute organ dysfunction present ([address])    2. Urinary tract infection without hematuria, site unspecified    3.  Acute kidney injury [facility])         Surgery: None       Pertinent Medical History: HTN, A-fib, HLD, CVA, glaucoma, left hip hemiarthroplasty 10/31/23       Precautions:  Fall Risk, alarm, weight bearing as tolerated L LE, anterolateral hip precautions       Assessment of current deficits    [x] Functional mobility            [x]ADLs           [x] Strength                   [x]Cognition    [x] Functional transfers          [x] IADLs          [x] Safety Awareness   [x]Endurance    [] Fine Coordination              [x] Balance      [] Vision/perception    []Sensation      []Gross Motor Coordination  [] ROM           [] Delirium                   [] Motor Control      OT PLAN OF CARE   OT POC based on physician orders, patient diagnosis and results of clinical assessment     Frequency/Duration 2-5 days/wk for 2 weeks PRN      Specific OT Treatment Interventions to include:   * Instruction/training on adapted ADL techniques and AE recommendations to increase functional independence within precautions       * Training on energy conservation strategies, correct breathing pattern and techniques to improve independence/tolerance for self-care routine  * Functional transfer/mobility training/DME recommendations for increased independence, safety, and fall prevention  * Patient/Family education to increase follow through with safety techniques and functional independence  * Recommendation of environmental modifications for increased safety with Bathing Moderate/Maximal Assist   LB sponge bath at EOB       Minimal Assist    Toileting Dependent   Incontinent of B&B x 2 episodes upon standing from EOB . Hygiene care completed. dependent Minimal Assist    Bed Mobility  Supine to sit: Moderate Assist   Sit to supine: Moderate Assist   Rolling: Moderate Assist     Supine to sit SBA  Supine to sit: Supervision   Sit to supine: Supervision    Functional Transfers Sit to stand: Moderate Assist   Stand to sit: Moderate Assist      Transfer training with verbal cues for hand placement to improve safety. Sit <> stand min A  Supervision    Functional Mobility NT due to pt incontinent upon standing      Min to mod A with WW in room and for short distance in [place], increased assist required with fatigue SBA with use of wheeled walker       Balance Sitting:     Static: fair plus     Dynamic: fair/fair plus  Standing: fair minus with walker     Sitting balance SBA  Standing balance min A/mod A with [employer]  Sitting:     Static: good    Dynamic: good  Standing: fair plus with walker    Activity Tolerance fair   fair  Increase standing tolerance >3  minutes for improved engagement with functional transfers and indep in ADLs        Comments:  patient cleared with nursing and agreeable to session. Pleasant and motivated. Patient fatigued but improvement continues to be demonstrated today. Patient in chair with call light in reach and alarm on. Daughter present    Education/treatment: ADL and functional transfer/activity performed to increase safety and independence during self care tasks. Education provided on safety awareness, adl reeducation, functional transfer training. Pt has made progress towards set goals.      Time In: 11:23  Time Out: 11:33     Min Units   Therapeutic Ex 89905     Therapeutic Activities [id number] 41 7   ADL/Self Care 79861     Orthotic Management 11678     Neuro Re-Ed 88285     Non-Billable Time     TOTAL TIMED TREATMENT 10 1       Kaitlyn CHUNG

## 2023-11-14 NOTE — PLAN OF CARE
Problem: ABCDS Injury Assessment  Goal: Absence of physical injury  11/13/2023 2222 by Amber Zelaya RN  Outcome: Progressing  11/13/2023 1059 by Abhinav Reed RN  Outcome: Progressing     Problem: Skin/Tissue Integrity  Goal: Absence of new skin breakdown  Description: 1. Monitor for areas of redness and/or skin breakdown  2. Assess vascular access sites hourly  3. Every 4-6 hours minimum:  Change oxygen saturation probe site  4. Every 4-6 hours:  If on nasal continuous positive airway pressure, respiratory therapy assess nares and determine need for appliance change or resting period.   11/13/2023 2222 by Amber Zelaya RN  Outcome: Progressing  11/13/2023 1059 by Abhinav Reed RN  Outcome: Progressing     Problem: Discharge Planning  Goal: Discharge to home or other facility with appropriate resources  11/13/2023 2222 by Amber Zelaya RN  Outcome: Progressing  Flowsheets (Taken 11/13/2023 2217)  Discharge to home or other facility with appropriate resources: Refer to discharge planning if patient needs post-hospital services based on physician order or complex needs related to functional status, cognitive ability or social support system  11/13/2023 1059 by Abhinav Reed RN  Outcome: Progressing     Problem: Safety - Adult  Goal: Free from fall injury  11/13/2023 2222 by Amber Zelaya RN  Outcome: Progressing  11/13/2023 1059 by Abhinav Reed RN  Outcome: Progressing     Problem: Pain  Goal: Verbalizes/displays adequate comfort level or baseline comfort level  11/13/2023 2222 by Amber Zelaya RN  Outcome: Progressing  11/13/2023 1059 by Abhinav Reed RN  Outcome: Progressing     Problem: Nutrition Deficit:  Goal: Optimize nutritional status  11/13/2023 2222 by Amber Zelaya RN  Outcome: Progressing  11/13/2023 1059 by Abhinav Reed RN  Outcome: Progressing     Problem: Chronic Conditions and Co-morbidities  Goal: Patient's chronic conditions and

## 2023-11-14 NOTE — PROGRESS NOTES
Physical Therapy  Facility/Department: [address] MED SURG/TELE  Physical Therapy Treatment Note    Name: Ashley Jacob  : 1940  MRN: [id number]  Date of Service: 2023      Attending Provider:  Carlotta Fields MD    Evaluating PT:  Tiana Bello. Beau Justice P.T. Room #:  8919/9604-Q  Diagnosis:  UTI (urinary tract infection) [N39.0]  Pertinent PMHx/PSHx:  10/31/23 L hip hemiarthroplasty, CVA with memory issues  Precautions: WBAT LLE, anterolateral L hip precautions, falls, bed/chair alarm, droplet+ isolation     SUBJECTIVE:    Pt lives with her daughter in a 1 story home with 2+1 stairs and no rail to enter. Pt came in from [facility] and was ambulating short distances with a ww PTA. OBJECTIVE:   Initial Evaluation  Date: 11/10/23 Treatment  2023 Short Term/ Long Term   Goals   Was pt agreeable to Eval/treatment? yes yes    Does pt have pain? No c/o pain No complaints    Bed Mobility  Rolling: SBA  Supine to sit: MIN A  Sit to supine: MIN A  Scooting: MIN A to EOB Rolling: NT  Supine to sit: NT  Scooting: NT supervision   Transfers Sit to stand: MOD A  Stand to sit: MIN A  Stand pivot: MIN A with ww Sit <> stand: Min A of 2 SBA   Ambulation   10 feet with ww MIN A 45 feet with [employer] Min/Mod A 100 feet with ww SBA   Stair negotiation: ascended and descended NA NT 2 steps with ww CGA   AM-PAC 6 Clicks 40/38 15/39      Pt is alert, following instruction  Balance: poor dynamic with [employer]    Pt performed therapeutic exercise of the following: NT    Patient education/treatment  Pt was educated on UE usage for transfer safety, gait mechanics for posture/sequence and staying within [employer] base of support    Patient response to education:   Pt verbalized understanding Pt demonstrated skill Pt requires further education in this area   yes With instruction yes     ASSESSMENT:   Comments: Nurse ok with Rx. Pt found in a bedside chair, agreed to Rx.  Daughter present and with good encouragement Gait remains slow, more consistent

## 2023-11-15 PROCEDURE — 1200000000 HC SEMI PRIVATE

## 2023-11-15 PROCEDURE — 2580000003 HC RX 258: Performed by: NURSE PRACTITIONER

## 2023-11-15 PROCEDURE — 6370000000 HC RX 637 (ALT 250 FOR IP): Performed by: FAMILY MEDICINE

## 2023-11-15 PROCEDURE — 6370000000 HC RX 637 (ALT 250 FOR IP): Performed by: NURSE PRACTITIONER

## 2023-11-15 RX ADMIN — AMLODIPINE BESYLATE 5 MG: 5 TABLET ORAL at 20:42

## 2023-11-15 RX ADMIN — Medication 10 ML: at 20:40

## 2023-11-15 RX ADMIN — METOPROLOL SUCCINATE 50 MG: 50 TABLET, EXTENDED RELEASE ORAL at 08:08

## 2023-11-15 RX ADMIN — Medication 6 MG: at 20:42

## 2023-11-15 RX ADMIN — APIXABAN 2.5 MG: 2.5 TABLET, FILM COATED ORAL at 20:42

## 2023-11-15 RX ADMIN — APIXABAN 2.5 MG: 2.5 TABLET, FILM COATED ORAL at 08:08

## 2023-11-15 RX ADMIN — Medication 10 ML: at 08:09

## 2023-11-15 ASSESSMENT — PAIN SCALES - GENERAL: PAINLEVEL_OUTOF10: 0

## 2023-11-15 NOTE — PLAN OF CARE
Problem: ABCDS Injury Assessment  Goal: Absence of physical injury  11/15/2023 1128 by Everette Adams RN  Outcome: Progressing  11/15/2023 0150 by Kizzy Velasco RN  Outcome: Progressing     Problem: Skin/Tissue Integrity  Goal: Absence of new skin breakdown  Description: 1. Monitor for areas of redness and/or skin breakdown  2. Assess vascular access sites hourly  3. Every 4-6 hours minimum:  Change oxygen saturation probe site  4. Every 4-6 hours:  If on nasal continuous positive airway pressure, respiratory therapy assess nares and determine need for appliance change or resting period.   11/15/2023 1128 by Everette Adams RN  Outcome: Progressing  11/15/2023 0150 by Kizzy Velasco RN  Outcome: Progressing     Problem: Discharge Planning  Goal: Discharge to home or other facility with appropriate resources  11/15/2023 0150 by Kizzy Velasco RN  Outcome: Progressing

## 2023-11-15 NOTE — CARE COORDINATION
Pt accepted to [address] [place] [address]; auth initiated 11/14. await approval. Transport forms on chart. Marroxann Carlos Enrique will obtain [phone] from [facility]. Magda Singleton.

## 2023-11-16 PROCEDURE — 2580000003 HC RX 258: Performed by: NURSE PRACTITIONER

## 2023-11-16 PROCEDURE — 97535 SELF CARE MNGMENT TRAINING: CPT

## 2023-11-16 PROCEDURE — 97530 THERAPEUTIC ACTIVITIES: CPT

## 2023-11-16 PROCEDURE — 6370000000 HC RX 637 (ALT 250 FOR IP): Performed by: FAMILY MEDICINE

## 2023-11-16 PROCEDURE — 1200000000 HC SEMI PRIVATE

## 2023-11-16 PROCEDURE — 6370000000 HC RX 637 (ALT 250 FOR IP): Performed by: NURSE PRACTITIONER

## 2023-11-16 RX ADMIN — Medication 10 ML: at 08:42

## 2023-11-16 RX ADMIN — Medication 6 MG: at 20:32

## 2023-11-16 RX ADMIN — AMLODIPINE BESYLATE 5 MG: 5 TABLET ORAL at 20:32

## 2023-11-16 RX ADMIN — METOPROLOL SUCCINATE 50 MG: 50 TABLET, EXTENDED RELEASE ORAL at 20:32

## 2023-11-16 RX ADMIN — APIXABAN 2.5 MG: 2.5 TABLET, FILM COATED ORAL at 08:42

## 2023-11-16 RX ADMIN — APIXABAN 2.5 MG: 2.5 TABLET, FILM COATED ORAL at 20:32

## 2023-11-16 RX ADMIN — Medication 10 ML: at 20:32

## 2023-11-16 RX ADMIN — METOPROLOL SUCCINATE 50 MG: 50 TABLET, EXTENDED RELEASE ORAL at 08:42

## 2023-11-16 NOTE — DISCHARGE INSTR - COC
Continuity of Care Form    Patient Name: Himanshu Saab   :  1940  MRN:  [id number]    Admit date:  2023  Discharge date:  ***    Code Status Order: DNR-CC   Advance Directives:     Admitting Physician:  Petros Rondon DO  PCP: Vincent Dias MD    Discharging Nurse: [facility] Unit/Room#: 2394/9008-J  Discharging Unit Phone Number: [phone]    Emergency Contact:   Extended Emergency Contact Information  Primary Emergency Contact: William Hernandez of [address] [place] Phone: [phone]  Mobile Phone: [phone]  Relation: Child  Secondary Emergency Contact: Owen Choi \"Madhu\"  Address: [address] [place] [address], [address] [facility] of [address] [place] Phone: [phone]  Mobile Phone: [phone]  Relation: Child  Preferred language: English   needed? No    Past Surgical History:  Past Surgical History:   Procedure Laterality Date    APPENDECTOMY      CAROTID ENDARTERECTOMY Left 2019    LEFT CAROTID ENDARTERECTOMY performed by Erika Crews MD at [address] [place] [address] Bilateral     cataracts    HIP SURGERY Left 10/31/2023    left HIP HEMIARTHROPLASTY performed by Zaire Knott DO at [facility] OR       Immunization History:   Immunization History   Administered Date(s) Administered    COVID-19, MODERNA Bivalent, (age 12y+), IM, 48 mcg/0.5 mL 2022       Active Problems:  Patient Active Problem List   Diagnosis Code    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 XXXA    UTI (urinary tract infection) N39.0       Isolation/Infection:   Isolation            No Isolation          Patient Infection Status

## 2023-11-16 NOTE — PROGRESS NOTES
Patients left hip noticeably more red this shift at site of previous hip sx. Reached out to Dr Sharifa Eldridge who performed surgery. New orders as follows; remove staples and place steristrips to L hip incision as long as there is no drainage. To f/u in office after discharge. Staples removed per order, steri strips placed, patient tolerated well.

## 2023-11-16 NOTE — PROGRESS NOTES
Physical Therapy  Facility/Department: [address] MED SURG/TELE  Physical Therapy Treatment Note    Name: Larisa Crespo  : 1940  MRN: [id number]  Date of Service: 2023      Attending Provider:  Jany Banuelos DO    Evaluating PT:  Nelva Lobstein Antoinette Burkitt., P.T. Room #:  8558/8651-P  Diagnosis:  UTI (urinary tract infection) [N39.0]  Pertinent PMHx/PSHx:  10/31/23 L hip hemiarthroplasty, CVA with memory issues  Precautions: WBAT LLE, anterolateral L hip precautions, falls, bed/chair alarm, droplet+ isolation     SUBJECTIVE:    Pt lives with her daughter in a 1 story home with 2+1 stairs and no rail to enter. Pt came in from [facility] and was ambulating short distances with a ww PTA. OBJECTIVE:   Initial Evaluation  Date: 11/10/23 Treatment  2023 Short Term/ Long Term   Goals   Was pt agreeable to Eval/treatment? yes yes    Does pt have pain? No c/o pain No complaints    Bed Mobility  Rolling: SBA  Supine to sit: MIN A  Sit to supine: MIN A  Scooting: MIN A to EOB Rolling: NT  Supine to sit: NT  Scooting: NT supervision   Transfers Sit to stand: MOD A  Stand to sit: MIN A  Stand pivot: MIN A with ww Sit <> stand: Mod A  SBA   Ambulation   10 feet with ww MIN A 60 feet with [employer] Min/Mod A 100 feet with ww SBA   Stair negotiation: ascended and descended NA NT 2 steps with ww CGA   AM-PAC 6 Clicks  75/04      Pt is alert, following instruction  Balance: poor dynamic with [employer]    Pt performed therapeutic exercise of the following: NT    Patient education/treatment  Pt was educated on UE usage for transfer safety, gait mechanics for posture/sequence and staying within [employer] base of support    Patient response to education:   Pt verbalized understanding Pt demonstrated skill Pt requires further education in this area   yes With instruction yes     ASSESSMENT:   Comments: Nurse ok with Rx. Pt found in a bedside chair, agreed to Rx.  Daughter present and with good encouragement Gait remains slow, consistent

## 2023-11-16 NOTE — CARE COORDINATION
Awaiting insurance auth from pt insurance for [facility]; transport forms on chart. Belia Luu.

## 2023-11-16 NOTE — PLAN OF CARE
Problem: ABCDS Injury Assessment  Goal: Absence of physical injury  11/16/2023 0020 by Reford Brunner, RN  Outcome: Progressing  11/15/2023 1128 by Vasile Monsalve RN  Outcome: Progressing     Problem: Skin/Tissue Integrity  Goal: Absence of new skin breakdown  Description: 1. Monitor for areas of redness and/or skin breakdown  2. Assess vascular access sites hourly  3. Every 4-6 hours minimum:  Change oxygen saturation probe site  4. Every 4-6 hours:  If on nasal continuous positive airway pressure, respiratory therapy assess nares and determine need for appliance change or resting period.   11/16/2023 0020 by Reford Brunner, RN  Outcome: Progressing  11/15/2023 1128 by Vasile Monsalve RN  Outcome: Progressing     Problem: Discharge Planning  Goal: Discharge to home or other facility with appropriate resources  Outcome: Progressing

## 2023-11-16 NOTE — PLAN OF CARE
Problem: ABCDS Injury Assessment  Goal: Absence of physical injury  11/16/2023 1000 by John Beal RN  Outcome: Progressing  11/16/2023 0020 by Blu Smith RN  Outcome: Progressing     Problem: Skin/Tissue Integrity  Goal: Absence of new skin breakdown  Description: 1. Monitor for areas of redness and/or skin breakdown  2. Assess vascular access sites hourly  3. Every 4-6 hours minimum:  Change oxygen saturation probe site  4. Every 4-6 hours:  If on nasal continuous positive airway pressure, respiratory therapy assess nares and determine need for appliance change or resting period.   11/16/2023 1000 by John Beal RN  Outcome: Progressing  11/16/2023 0020 by Blu Smith RN  Outcome: Progressing     Problem: Discharge Planning  Goal: Discharge to home or other facility with appropriate resources  11/16/2023 0020 by Blu Smith RN  Outcome: Progressing

## 2023-11-17 VITALS
SYSTOLIC BLOOD PRESSURE: 135 MMHG | TEMPERATURE: 98.2 F | RESPIRATION RATE: 16 BRPM | WEIGHT: 144.84 LBS | HEIGHT: 62 IN | BODY MASS INDEX: 26.65 KG/M2 | OXYGEN SATURATION: 97 % | DIASTOLIC BLOOD PRESSURE: 63 MMHG | HEART RATE: 82 BPM

## 2023-11-17 PROCEDURE — 2580000003 HC RX 258: Performed by: NURSE PRACTITIONER

## 2023-11-17 PROCEDURE — 6370000000 HC RX 637 (ALT 250 FOR IP): Performed by: NURSE PRACTITIONER

## 2023-11-17 RX ADMIN — Medication 10 ML: at 08:38

## 2023-11-17 RX ADMIN — METOPROLOL SUCCINATE 50 MG: 50 TABLET, EXTENDED RELEASE ORAL at 08:38

## 2023-11-17 RX ADMIN — APIXABAN 2.5 MG: 2.5 TABLET, FILM COATED ORAL at 08:38

## 2023-11-17 NOTE — CARE COORDINATION
11/17/2023 Social Work Discharge Planning: Per liaison, facility will pick Pt up at [address] notified nurse here and daughter Ashish Flood. Electronically signed by CATALINA Peoples on 11/17/2023 at 2:59 PM'

## 2023-11-17 NOTE — CARE COORDINATION
Isai mera for [facility]; transport forms on chart. Billy Cadena from [facility] will obtain [phone] from Marilu Tabor. John Aragon.

## 2023-11-17 NOTE — DISCHARGE SUMMARY
[facility]   Discharge summary   Patient ID:  Dillon Eubanks  [id number]  89 y.o.  1940    Admit date: 11/9/2023    Discharge date and time: 11/17/2023    Admission Diagnoses:   Patient Active Problem List   Diagnosis    Posterior choroidal artery infarction [facility])    Persistent atrial fibrillation (HCC)    Essential hypertension    Mixed hyperlipidemia    Stenosis of left carotid artery    Bilateral carotid artery stenosis    GI bleed    Cerebrovascular accident (CVA) ([address])    Atrial fibrillation ([address])    Falls, initial encounter    UTI (urinary tract infection)       Discharge Diagnoses: ***    Consults: {consultation:[id number]}    Procedures: ***    Hospital Course: The patient is a 78 y.o. female of Keith Pierre MD with significant past medical history of *** who presents with ***    No results for input(s): \"WBC\", \"HGB\", \"HCT\", \"PLT\" in the last 72 hours. No results for input(s): \"NA\", \"K\", \"CL\", \"CO2\", \"BUN\", \"CREATININE\", \"GLU\", \"CALCIUM\" in the last 72 hours. CT Head W/O Contrast    Result Date: 11/9/2023  EXAM: CT Head Without Intravenous Contrast EXAM DATE/TIME: 11/9/2023 12:38 pm CLINICAL HISTORY: ORDERING SYSTEM PROVIDED HISTORY: ams on Style on Screen  TECHNOLOGIST PROVIDED HISTORY:  Reason for exam:->ams on eliquis  Has a \"code stroke\" or \"stroke alert\" been called? ->No  Decision Support Exception - unselect if not a suspected or confirmed emergency medical condition->Emergency Medical Condition (MA) TECHNIQUE: Axial computed tomography images of the head/brain without intravenous contrast.  This CT exam was performed using one or more of the following dose reduction techniques:  automated exposure control, adjustment of the mA and/or kV according to patient size, and/or use of iterative reconstruction technique. COMPARISON: 10/30/2023 FINDINGS: Brain:  No evidence of acute intracranial process. No acute ischemia. No mass or mass effect. No acute intracranial hemorrhage.   Scattered

## 2023-11-17 NOTE — PLAN OF CARE
Problem: ABCDS Injury Assessment  Goal: Absence of physical injury  Outcome: Progressing     Problem: Skin/Tissue Integrity  Goal: Absence of new skin breakdown  Description: 1. Monitor for areas of redness and/or skin breakdown  2. Assess vascular access sites hourly  3. Every 4-6 hours minimum:  Change oxygen saturation probe site  4. Every 4-6 hours:  If on nasal continuous positive airway pressure, respiratory therapy assess nares and determine need for appliance change or resting period.   Outcome: Progressing     Problem: Discharge Planning  Goal: Discharge to home or other facility with appropriate resources  Outcome: Progressing     Problem: Safety - Adult  Goal: Free from fall injury  Outcome: Progressing     Problem: Pain  Goal: Verbalizes/displays adequate comfort level or baseline comfort level  Outcome: Progressing     Problem: Nutrition Deficit:  Goal: Optimize nutritional status  Outcome: Progressing     Problem: Chronic Conditions and Co-morbidities  Goal: Patient's chronic conditions and co-morbidity symptoms are monitored and maintained or improved  Outcome: Progressing

## 2023-11-22 ENCOUNTER — TELEPHONE (OUTPATIENT)
Dept: ORTHOPEDIC SURGERY | Age: 83
End: 2023-11-22

## 2023-11-22 NOTE — TELEPHONE ENCOUNTER
lakeisha felton - wound care nurse at [facility], called office with concerns of patient's incision. Karla Beach noted redness at the end of incision. slightly warm to touch. no drainage. no complaints of pain. daughter requested that the nurse call to notify Dr. Antoni Fuller.      Future Appointments   Date Time Provider [address]   11/30/2023  8:00 AM Padma Thakkar DO [facility]     Date of Procedure: 10/31/2023  Pre-Op Diagnosis Codes:  Closed left femoral neck fracture  Procedure(s):  left HIP HEMIARTHROPLASTY  Surgeon(s):  Padma Thakkar DO

## 2023-11-22 NOTE — TELEPHONE ENCOUNTER
Call placed to [address] [place] [address]. Spoke to nurse, Bahman Lang. Jesi Mahoney unavailable at this time. Saline Cuff of order for keflex. Advised to continue to monitor incision. If symptoms significantly worsen, recommend patient visit emergency department for evaluation. Encouraged to call with questions or concerns. Bahman Lang verbalized understanding.      Future Appointments   Date Time Provider [address]   11/30/2023  8:00 AM DO Melinda Montana [facility]

## 2023-11-22 NOTE — TELEPHONE ENCOUNTER
Dr Sahra Sears away until Tuesday 11/28/2023. Dr Sahra Sears told us Dr Addis Chacko covering for him today. Is this just FYI with no action required at this time?  If action needed we will have to defer to Dr Addis Chacko or one of PA's

## 2023-11-30 NOTE — PROGRESS NOTES
pivot: Stand by Assist   Supervision    Functional Mobility Min A with SPC or ww  D/t impaired safety/cues to avoid obstacles. List to R  Mod indep with AAD  With good balance   Balance Sitting:     Static:  wfl    Dynamic:wfl  Standing: Min A     Activity Tolerance fair     Visual/  Perceptual Glasses: reading  Perception appears impaired , requires further testing                Hand dominance: R  UE ROM: RUE:  WFL  LUE:  WFL  Strength: RUE: grossly 4/5 LUE: grossly 3+/5   Strength: B WFL  Fine Motor Coordination: fair    Hearing: WFL  Sensation:  c/o numbness or tingling B hands  Tone:  WFL  Edema: None noted                            Comments/Treatment: Upon arrival, patient was in bathroom and ambulated to her bed with SPC Pt demonstrated mild/mod unsteady balance during functional mobility with SPC. She improved balance and posture with use of ww. Pt required cues for safety to avoid obstacles and bumping into walls. Pt reported concerns with memory and dates. At end of session, patient in bed, with call light and phone within reach, all lines and tubes intact. Pt demonstrating good- understanding of education/techniques. Patient would benefit from continued OT  to improve safety, functional independence and quality of life.     Eval Complexity: Low    Assessment of current deficits   Functional mobility [x]  ADLs [x] Strength [x]  Cognition [x]  Functional transfers  [x] IADLs [] Safety Awareness [x]  Endurance [x]  Fine Motor Coordination [] Balance [x] Vision/perception [] Sensation []   Gross Motor Coordination [] ROM [] Delirium []                  Motor Control []    Plan of Care:   ADL retraining [x]   Equipment needs [x]   Neuromuscular re-education [x] Energy Conservation Techniques [x]  Functional Transfer training [x] Patient and/or Family Education [x]  Functional Mobility training [x]  Environmental Modifications [x]  Cognitive re-training [x]   Compensatory techniques for ADLs 150

## 2023-12-07 ENCOUNTER — OFFICE VISIT (OUTPATIENT)
Dept: ORTHOPEDIC SURGERY | Age: 83
End: 2023-12-07

## 2023-12-07 DIAGNOSIS — Z87.81 S/P LEFT HIP FRACTURE: Primary | ICD-10-CM

## 2023-12-07 PROCEDURE — 99024 POSTOP FOLLOW-UP VISIT: CPT | Performed by: STUDENT IN AN ORGANIZED HEALTH CARE EDUCATION/TRAINING PROGRAM

## 2023-12-07 RX ORDER — ASPIRIN 81 MG/1
81 TABLET, CHEWABLE ORAL DAILY
COMMUNITY

## 2023-12-07 NOTE — PATIENT INSTRUCTIONS
INSTRUCTIONS FOR FACILITY      Weight Bearing: Weight bearing as tolerated left lower extremity. Use assistive devices when needed. Therapy: Continue PT/OT-anterolateral hip precautions with gait training and functional rehabilitation    Pain control: per facility physician        DVT Prophylaxis: Continue with 81 mg aspirin daily    Follow up: Follow-up in 6 weeks    No future appointments. Call office with any questions or concerns.

## 2023-12-07 NOTE — PROGRESS NOTES
Follow Up Post Operative Visit     Surgery: Left hip hemiarthroplasty  Date: 10/31/23    Subjective:    Shikha Garcia is here for follow up visit s/p above procedure. She has been doing very well. She is living in private field. She she has no pain. She has been taking her DVT prophylaxis. Denies fever chills. Denies numbness tingling. Controlled Substances Monitoring:        Physical Exam:    No data recorded    General: Alert and oriented x3, no acute distress  Cardiovascular/pulmonary: No labored breathing, peripheral perfusion intact  Musculoskeletal:    Incision well-approximated over hip. No signs of infection or drainage. Mildly tender laterally. No pain with hip range of motion. Thigh soft compressible. Full range of motion of knee with 5-5 strength      Imaging: AP pelvis and 2 views of the left hip demonstrate a well-placed hemiarthroplasty without signs of complication. These images were independently interpreted by myself and discussed with the patient    Assessment and Plan: 5 weeks status post left hip hemiarthroplasty  -He can continue her aspirin 81 mg daily which is her baseline medication. She is doing very well. Continue PT and OT with anterolateral hip precautions. We discussed her recovery from hemiarthroplasty at her age and how can be a long process. She is exceeding expectations. Hopefully she can get home from the rehab facility soon. Follow-up in 6 weeks              [facility]   Orthopaedic Surgery   12/7/23  8:34 AM    Note: This report was completed using [employer] voiced recognition software. Every effort has been made to ensure accuracy; however, inadvertent computerized transcription errors may be present.

## 2023-12-09 PROBLEM — N39.0 UTI (URINARY TRACT INFECTION): Status: RESOLVED | Noted: 2023-11-09 | Resolved: 2023-12-09

## 2024-02-01 ENCOUNTER — OFFICE VISIT (OUTPATIENT)
Dept: ORTHOPEDIC SURGERY | Age: 84
End: 2024-02-01
Payer: MEDICARE

## 2024-02-01 DIAGNOSIS — Z87.81 S/P LEFT HIP FRACTURE: Primary | ICD-10-CM

## 2024-02-01 PROCEDURE — 99213 OFFICE O/P EST LOW 20 MIN: CPT | Performed by: STUDENT IN AN ORGANIZED HEALTH CARE EDUCATION/TRAINING PROGRAM

## 2024-02-01 PROCEDURE — 1123F ACP DISCUSS/DSCN MKR DOCD: CPT | Performed by: STUDENT IN AN ORGANIZED HEALTH CARE EDUCATION/TRAINING PROGRAM

## 2024-02-01 RX ORDER — QUETIAPINE FUMARATE 50 MG/1
50 TABLET, FILM COATED ORAL NIGHTLY
COMMUNITY
Start: 2024-01-29

## 2024-02-01 RX ORDER — DONEPEZIL HYDROCHLORIDE 5 MG/1
TABLET, FILM COATED ORAL
COMMUNITY
Start: 2024-01-31

## 2024-02-01 RX ORDER — SERTRALINE HYDROCHLORIDE 25 MG/1
TABLET, FILM COATED ORAL
COMMUNITY
Start: 2024-01-31

## 2024-02-01 NOTE — PROGRESS NOTES
Follow Up Post Operative Visit     Surgery: Left hip hemiarthroplasty  Date: 10/31/23    Subjective:  She is doing very well.  She is ambulating with a walker pain-free.  She was recently diagnosed with dementia.  Last week she had a stomach virus which is slowed her down but overall she is very happy with the results.  Malia Choi is here for follow up visit s/p above procedure.      Controlled Substances Monitoring:        Physical Exam:    No data recorded    General: Alert and oriented x3, no acute distress  Cardiovascular/pulmonary: No labored breathing, peripheral perfusion intact  Musculoskeletal:    Well-healed surgical incision.  No pain with hip range of motion.  Nontender over the lateral hip..  No pain with hip range of motion.  Thigh soft compressible.  Full range of motion of knee with 5-5 strength      Imaging: AP pelvis and 2 views of the left hip demonstrate a well-placed hemiarthroplasty without signs of complication.  These images were independently interpreted by myself and discussed with the patient    Assessment and Plan: Approximately 3 months status post left hip hemiarthroplasty    -He can continue her aspirin 81 mg daily which is her baseline medication.  She is starting to suffer from dementia which was just recently diagnosed.  As far as her hip goes I did explain that she could lose level of ambulatory function and may require an assist device long-term.  She is okay with this.  She is very happy with the results.  At this point she can follow with me on an as-needed basis          Lawson Dugan DO   Orthopaedic Surgery   2/1/24  1:54 PM    Note: This report was completed using [employer] voiced recognition software.  Every effort has been made to ensure accuracy; however, inadvertent computerized transcription errors may be present.

## 2024-03-09 NOTE — PROGRESS NOTES
OCCUPATIONAL THERAPY TREATMENT NOTE  [facility] [address] [place] [address] [address], [address] [place] [address] Drive    BYNC:  Patient Name: Shikha Garcia  MRN: [id number]  : 1940  Room: [address] [place] [address]      Evaluating OT: Karoline Carrasco OTR/L #[id number]     Referring Provider:  Narciso Hicks MD      Specific Provider Orders/Date:  OT Eval and Treat, 23      Diagnosis:   1. Sepsis, due to unspecified organism, unspecified whether acute organ dysfunction present ([address])    2. Urinary tract infection without hematuria, site unspecified    3.  Acute kidney injury [facility])         Surgery: None       Pertinent Medical History: HTN, A-fib, HLD, CVA, glaucoma, left hip hemiarthroplasty 10/31/23       Precautions:  Fall Risk, alarm, weight bearing as tolerated L LE, anterolateral hip precautions       Assessment of current deficits    [x] Functional mobility            [x]ADLs           [x] Strength                   [x]Cognition    [x] Functional transfers          [x] IADLs          [x] Safety Awareness   [x]Endurance    [] Fine Coordination              [x] Balance      [] Vision/perception    []Sensation      []Gross Motor Coordination  [] ROM           [] Delirium                   [] Motor Control      OT PLAN OF CARE   OT POC based on physician orders, patient diagnosis and results of clinical assessment     Frequency/Duration 2-5 days/wk for 2 weeks PRN      Specific OT Treatment Interventions to include:   * Instruction/training on adapted ADL techniques and AE recommendations to increase functional independence within precautions       * Training on energy conservation strategies, correct breathing pattern and techniques to improve independence/tolerance for self-care routine  * Functional transfer/mobility training/DME recommendations for increased independence, safety, and fall prevention  * Patient/Family education to increase follow through with safety techniques and functional 

## 2024-10-22 ENCOUNTER — APPOINTMENT (OUTPATIENT)
Dept: GENERAL RADIOLOGY | Age: 84
DRG: 205 | End: 2024-10-22
Payer: MEDICARE

## 2024-10-22 ENCOUNTER — HOSPITAL ENCOUNTER (INPATIENT)
Age: 84
LOS: 3 days | Discharge: HOME HEALTH | DRG: 205 | End: 2024-10-25
Attending: EMERGENCY MEDICINE | Admitting: INTERNAL MEDICINE
Payer: MEDICARE

## 2024-10-22 DIAGNOSIS — E87.70 HYPERVOLEMIA, UNSPECIFIED HYPERVOLEMIA TYPE: ICD-10-CM

## 2024-10-22 DIAGNOSIS — G93.41 METABOLIC ENCEPHALOPATHY: Primary | ICD-10-CM

## 2024-10-22 DIAGNOSIS — J18.9 PNEUMONIA OF RIGHT LOWER LOBE DUE TO INFECTIOUS ORGANISM: ICD-10-CM

## 2024-10-22 LAB
ALBUMIN SERPL-MCNC: 3.9 G/DL (ref 3.5–5.2)
ALP SERPL-CCNC: 107 U/L (ref 35–104)
ALT SERPL-CCNC: 9 U/L (ref 0–32)
ANION GAP SERPL CALCULATED.3IONS-SCNC: 11 MMOL/L (ref 7–16)
AST SERPL-CCNC: 17 U/L (ref 0–31)
BACTERIA URNS QL MICRO: ABNORMAL
BASOPHILS # BLD: 0.05 K/UL (ref 0–0.2)
BASOPHILS NFR BLD: 1 % (ref 0–2)
BILIRUB DIRECT SERPL-MCNC: 0.4 MG/DL (ref 0–0.3)
BILIRUB INDIRECT SERPL-MCNC: 0.7 MG/DL (ref 0–1)
BILIRUB SERPL-MCNC: 1.1 MG/DL (ref 0–1.2)
BILIRUB UR QL STRIP: NEGATIVE
BNP SERPL-MCNC: ABNORMAL PG/ML (ref 0–450)
BUN SERPL-MCNC: 19 MG/DL (ref 6–23)
CALCIUM SERPL-MCNC: 8.8 MG/DL (ref 8.6–10.2)
CHLORIDE SERPL-SCNC: 103 MMOL/L (ref 98–107)
CLARITY UR: CLEAR
CO2 SERPL-SCNC: 22 MMOL/L (ref 22–29)
COLOR UR: YELLOW
CREAT SERPL-MCNC: 1.1 MG/DL (ref 0.5–1)
EKG ATRIAL RATE: 163 BPM
EKG Q-T INTERVAL: 458 MS
EKG QRS DURATION: 134 MS
EKG QTC CALCULATION (BAZETT): 476 MS
EKG R AXIS: 100 DEGREES
EKG T AXIS: 123 DEGREES
EKG VENTRICULAR RATE: 65 BPM
EOSINOPHIL # BLD: 0.19 K/UL (ref 0.05–0.5)
EOSINOPHILS RELATIVE PERCENT: 4 % (ref 0–6)
EPI CELLS #/AREA URNS HPF: ABNORMAL /HPF
ERYTHROCYTE [DISTWIDTH] IN BLOOD BY AUTOMATED COUNT: 17.4 % (ref 11.5–15)
GFR, ESTIMATED: 50 ML/MIN/1.73M2
GLUCOSE SERPL-MCNC: 107 MG/DL (ref 74–99)
GLUCOSE UR STRIP-MCNC: NEGATIVE MG/DL
HCT VFR BLD AUTO: 37.2 % (ref 34–48)
HGB BLD-MCNC: 11.3 G/DL (ref 11.5–15.5)
HGB UR QL STRIP.AUTO: ABNORMAL
IMM GRANULOCYTES # BLD AUTO: <0.03 K/UL (ref 0–0.58)
IMM GRANULOCYTES NFR BLD: 0 % (ref 0–5)
KETONES UR STRIP-MCNC: NEGATIVE MG/DL
LACTATE BLDV-SCNC: 0.9 MMOL/L (ref 0.5–2.2)
LEUKOCYTE ESTERASE UR QL STRIP: NEGATIVE
LYMPHOCYTES NFR BLD: 0.86 K/UL (ref 1.5–4)
LYMPHOCYTES RELATIVE PERCENT: 19 % (ref 20–42)
MAGNESIUM SERPL-MCNC: 2.2 MG/DL (ref 1.6–2.6)
MCH RBC QN AUTO: 25.1 PG (ref 26–35)
MCHC RBC AUTO-ENTMCNC: 30.4 G/DL (ref 32–34.5)
MCV RBC AUTO: 82.5 FL (ref 80–99.9)
MONOCYTES NFR BLD: 0.44 K/UL (ref 0.1–0.95)
MONOCYTES NFR BLD: 10 % (ref 2–12)
NEUTROPHILS NFR BLD: 66 % (ref 43–80)
NEUTS SEG NFR BLD: 3.02 K/UL (ref 1.8–7.3)
NITRITE UR QL STRIP: NEGATIVE
PH UR STRIP: 5.5 [PH] (ref 5–9)
PLATELET # BLD AUTO: 160 K/UL (ref 130–450)
PMV BLD AUTO: 9.6 FL (ref 7–12)
POTASSIUM SERPL-SCNC: 3.8 MMOL/L (ref 3.5–5)
PROT SERPL-MCNC: 7 G/DL (ref 6.4–8.3)
PROT UR STRIP-MCNC: NEGATIVE MG/DL
RBC # BLD AUTO: 4.51 M/UL (ref 3.5–5.5)
RBC #/AREA URNS HPF: ABNORMAL /HPF
SODIUM SERPL-SCNC: 136 MMOL/L (ref 132–146)
SP GR UR STRIP: 1.02 (ref 1–1.03)
T4 FREE SERPL-MCNC: 0.9 NG/DL (ref 0.9–1.7)
TSH SERPL DL<=0.05 MIU/L-ACNC: 4.32 UIU/ML (ref 0.27–4.2)
UROBILINOGEN UR STRIP-ACNC: 0.2 EU/DL (ref 0–1)
WBC #/AREA URNS HPF: ABNORMAL /HPF
WBC OTHER # BLD: 4.6 K/UL (ref 4.5–11.5)

## 2024-10-22 PROCEDURE — 6370000000 HC RX 637 (ALT 250 FOR IP): Performed by: NURSE PRACTITIONER

## 2024-10-22 PROCEDURE — 6360000002 HC RX W HCPCS

## 2024-10-22 PROCEDURE — 2500000003 HC RX 250 WO HCPCS

## 2024-10-22 PROCEDURE — 51701 INSERT BLADDER CATHETER: CPT

## 2024-10-22 PROCEDURE — 83540 ASSAY OF IRON: CPT

## 2024-10-22 PROCEDURE — 2060000000 HC ICU INTERMEDIATE R&B

## 2024-10-22 PROCEDURE — 85025 COMPLETE CBC W/AUTO DIFF WBC: CPT

## 2024-10-22 PROCEDURE — 81001 URINALYSIS AUTO W/SCOPE: CPT

## 2024-10-22 PROCEDURE — 82248 BILIRUBIN DIRECT: CPT

## 2024-10-22 PROCEDURE — 83550 IRON BINDING TEST: CPT

## 2024-10-22 PROCEDURE — 2580000003 HC RX 258

## 2024-10-22 PROCEDURE — 83605 ASSAY OF LACTIC ACID: CPT

## 2024-10-22 PROCEDURE — 93005 ELECTROCARDIOGRAM TRACING: CPT

## 2024-10-22 PROCEDURE — 6360000002 HC RX W HCPCS: Performed by: NURSE PRACTITIONER

## 2024-10-22 PROCEDURE — 93010 ELECTROCARDIOGRAM REPORT: CPT | Performed by: INTERNAL MEDICINE

## 2024-10-22 PROCEDURE — 99285 EMERGENCY DEPT VISIT HI MDM: CPT

## 2024-10-22 PROCEDURE — 83880 ASSAY OF NATRIURETIC PEPTIDE: CPT

## 2024-10-22 PROCEDURE — 84439 ASSAY OF FREE THYROXINE: CPT

## 2024-10-22 PROCEDURE — 83735 ASSAY OF MAGNESIUM: CPT

## 2024-10-22 PROCEDURE — 84443 ASSAY THYROID STIM HORMONE: CPT

## 2024-10-22 PROCEDURE — 80053 COMPREHEN METABOLIC PANEL: CPT

## 2024-10-22 PROCEDURE — 82728 ASSAY OF FERRITIN: CPT

## 2024-10-22 PROCEDURE — 71045 X-RAY EXAM CHEST 1 VIEW: CPT

## 2024-10-22 RX ORDER — DORZOLAMIDE HYDROCHLORIDE AND TIMOLOL MALEATE 20; 5 MG/ML; MG/ML
1 SOLUTION/ DROPS OPHTHALMIC 2 TIMES DAILY
Status: DISCONTINUED | OUTPATIENT
Start: 2024-10-22 | End: 2024-10-25 | Stop reason: HOSPADM

## 2024-10-22 RX ORDER — POTASSIUM CHLORIDE 1500 MG/1
40 TABLET, EXTENDED RELEASE ORAL PRN
Status: DISCONTINUED | OUTPATIENT
Start: 2024-10-22 | End: 2024-10-25 | Stop reason: HOSPADM

## 2024-10-22 RX ORDER — QUETIAPINE FUMARATE 100 MG/1
100 TABLET, FILM COATED ORAL NIGHTLY
Status: DISCONTINUED | OUTPATIENT
Start: 2024-10-22 | End: 2024-10-25 | Stop reason: HOSPADM

## 2024-10-22 RX ORDER — MAGNESIUM SULFATE IN WATER 40 MG/ML
2000 INJECTION, SOLUTION INTRAVENOUS PRN
Status: DISCONTINUED | OUTPATIENT
Start: 2024-10-22 | End: 2024-10-25 | Stop reason: HOSPADM

## 2024-10-22 RX ORDER — ONDANSETRON 4 MG/1
4 TABLET, ORALLY DISINTEGRATING ORAL EVERY 8 HOURS PRN
Status: DISCONTINUED | OUTPATIENT
Start: 2024-10-22 | End: 2024-10-25 | Stop reason: HOSPADM

## 2024-10-22 RX ORDER — ONDANSETRON 2 MG/ML
4 INJECTION INTRAMUSCULAR; INTRAVENOUS EVERY 6 HOURS PRN
Status: DISCONTINUED | OUTPATIENT
Start: 2024-10-22 | End: 2024-10-25 | Stop reason: HOSPADM

## 2024-10-22 RX ORDER — SODIUM CHLORIDE 0.9 % (FLUSH) 0.9 %
5-40 SYRINGE (ML) INJECTION EVERY 12 HOURS SCHEDULED
Status: DISCONTINUED | OUTPATIENT
Start: 2024-10-22 | End: 2024-10-25 | Stop reason: HOSPADM

## 2024-10-22 RX ORDER — HYDRALAZINE HYDROCHLORIDE 20 MG/ML
10 INJECTION INTRAMUSCULAR; INTRAVENOUS EVERY 6 HOURS PRN
Status: DISCONTINUED | OUTPATIENT
Start: 2024-10-22 | End: 2024-10-25 | Stop reason: HOSPADM

## 2024-10-22 RX ORDER — ACETAMINOPHEN 325 MG/1
650 TABLET ORAL EVERY 6 HOURS PRN
Status: DISCONTINUED | OUTPATIENT
Start: 2024-10-22 | End: 2024-10-25 | Stop reason: HOSPADM

## 2024-10-22 RX ORDER — SODIUM CHLORIDE 9 MG/ML
INJECTION, SOLUTION INTRAVENOUS PRN
Status: DISCONTINUED | OUTPATIENT
Start: 2024-10-22 | End: 2024-10-25 | Stop reason: HOSPADM

## 2024-10-22 RX ORDER — SODIUM CHLORIDE 0.9 % (FLUSH) 0.9 %
10 SYRINGE (ML) INJECTION PRN
Status: DISCONTINUED | OUTPATIENT
Start: 2024-10-22 | End: 2024-10-25 | Stop reason: HOSPADM

## 2024-10-22 RX ORDER — PROMETHAZINE HYDROCHLORIDE 25 MG/1
25 TABLET ORAL EVERY 6 HOURS PRN
Status: DISCONTINUED | OUTPATIENT
Start: 2024-10-22 | End: 2024-10-25 | Stop reason: HOSPADM

## 2024-10-22 RX ORDER — BUMETANIDE 0.25 MG/ML
0.5 INJECTION, SOLUTION INTRAMUSCULAR; INTRAVENOUS DAILY
Status: DISCONTINUED | OUTPATIENT
Start: 2024-10-22 | End: 2024-10-25 | Stop reason: HOSPADM

## 2024-10-22 RX ORDER — DONEPEZIL HYDROCHLORIDE 10 MG/1
5 TABLET, FILM COATED ORAL NIGHTLY
Status: DISCONTINUED | OUTPATIENT
Start: 2024-10-22 | End: 2024-10-25 | Stop reason: HOSPADM

## 2024-10-22 RX ORDER — LATANOPROST 50 UG/ML
1 SOLUTION/ DROPS OPHTHALMIC NIGHTLY
Status: DISCONTINUED | OUTPATIENT
Start: 2024-10-22 | End: 2024-10-25 | Stop reason: HOSPADM

## 2024-10-22 RX ORDER — SENNOSIDES 8.6 MG
650-1300 CAPSULE ORAL EVERY 8 HOURS PRN
COMMUNITY

## 2024-10-22 RX ORDER — METOPROLOL SUCCINATE 50 MG/1
50 TABLET, EXTENDED RELEASE ORAL 2 TIMES DAILY
Status: DISCONTINUED | OUTPATIENT
Start: 2024-10-22 | End: 2024-10-25 | Stop reason: HOSPADM

## 2024-10-22 RX ORDER — POTASSIUM CHLORIDE 7.45 MG/ML
10 INJECTION INTRAVENOUS PRN
Status: DISCONTINUED | OUTPATIENT
Start: 2024-10-22 | End: 2024-10-25 | Stop reason: HOSPADM

## 2024-10-22 RX ORDER — ASPIRIN 81 MG/1
81 TABLET, CHEWABLE ORAL EVERY MORNING
Status: DISCONTINUED | OUTPATIENT
Start: 2024-10-23 | End: 2024-10-25 | Stop reason: HOSPADM

## 2024-10-22 RX ORDER — ATORVASTATIN CALCIUM 40 MG/1
40 TABLET, FILM COATED ORAL NIGHTLY
Status: DISCONTINUED | OUTPATIENT
Start: 2024-10-22 | End: 2024-10-25 | Stop reason: HOSPADM

## 2024-10-22 RX ORDER — ACETAMINOPHEN 650 MG/1
650 SUPPOSITORY RECTAL EVERY 6 HOURS PRN
Status: DISCONTINUED | OUTPATIENT
Start: 2024-10-22 | End: 2024-10-25 | Stop reason: HOSPADM

## 2024-10-22 RX ADMIN — BUMETANIDE 0.5 MG: 0.25 INJECTION INTRAMUSCULAR; INTRAVENOUS at 18:44

## 2024-10-22 RX ADMIN — DONEPEZIL HYDROCHLORIDE 5 MG: 10 TABLET, FILM COATED ORAL at 20:48

## 2024-10-22 RX ADMIN — DOXYCYCLINE 100 MG: 100 INJECTION, POWDER, LYOPHILIZED, FOR SOLUTION INTRAVENOUS at 16:17

## 2024-10-22 RX ADMIN — CEFTRIAXONE SODIUM 2000 MG: 2 INJECTION, POWDER, FOR SOLUTION INTRAMUSCULAR; INTRAVENOUS at 16:06

## 2024-10-22 RX ADMIN — QUETIAPINE FUMARATE 100 MG: 100 TABLET ORAL at 20:47

## 2024-10-22 RX ADMIN — LATANOPROST 1 DROP: 50 SOLUTION OPHTHALMIC at 20:44

## 2024-10-22 RX ADMIN — HYDRALAZINE HYDROCHLORIDE 10 MG: 20 INJECTION INTRAMUSCULAR; INTRAVENOUS at 23:24

## 2024-10-22 RX ADMIN — APIXABAN 2.5 MG: 2.5 TABLET, FILM COATED ORAL at 20:47

## 2024-10-22 RX ADMIN — METOPROLOL SUCCINATE 50 MG: 50 TABLET, EXTENDED RELEASE ORAL at 20:50

## 2024-10-22 RX ADMIN — ATORVASTATIN CALCIUM 40 MG: 40 TABLET, FILM COATED ORAL at 20:47

## 2024-10-22 RX ADMIN — SERTRALINE HYDROCHLORIDE 25 MG: 50 TABLET ORAL at 20:47

## 2024-10-22 RX ADMIN — DORZOLAMIDE HYDROCHLORIDE AND TIMOLOL MALEATE 1 DROP: 20; 5 SOLUTION/ DROPS OPHTHALMIC at 20:46

## 2024-10-22 ASSESSMENT — PAIN - FUNCTIONAL ASSESSMENT
PAIN_FUNCTIONAL_ASSESSMENT: NONE - DENIES PAIN
PAIN_FUNCTIONAL_ASSESSMENT: NONE - DENIES PAIN

## 2024-10-22 NOTE — PROGRESS NOTES
4 Eyes Skin Assessment     NAME:  Malia Choi  YOB: 1940  MEDICAL RECORD NUMBER:  [id number]    The patient is being assessed for  Admission    I agree that at least one RN has performed a thorough Head to Toe Skin Assessment on the patient. ALL assessment sites listed below have been assessed.      Areas assessed by both nurses:    Head, Face, Ears, Shoulders, Back, Chest, Arms, Elbows, Hands, Sacrum. Buttock, Coccyx, Ischium, Legs. Feet and Heels, and Under Medical Devices         Does the Patient have a Wound? No noted wound(s)       Chung Prevention initiated by RN: Yes  Wound Care Orders initiated by RN: No    Pressure Injury (Stage 3,4, Unstageable, DTI, NWPT, and Complex wounds) if present, place Wound referral order by RN under : No    New Ostomies, if present place, Ostomy referral order under : No     Nurse 1 eSignature: Electronically signed by GRACIE HIGH RN on 10/22/24 at 6:26 PM EDT    **SHARE this note so that the co-signing nurse can place an eSignature**    Nurse 2 eSignature: Electronically signed by Shyann Toure RN on 10/22/24 at 7:03 PM EDT

## 2024-10-22 NOTE — PROGRESS NOTES
Database initiated. Patient is alert to self only (dementia) comes in from home with daughter who cares for her. She uses a rollator and is RA at baseline. Having difficulty walking recently.

## 2024-10-22 NOTE — ED NOTES
ED to Inpatient Handoff Report    Notified 6w that electronic handoff available and patient ready for transport to room 618.    Safety Risks: None identified    Patient in Restraints: no    Constant Observer or Patient : no    Telemetry Monitoring Ordered: No          Order to transfer to unit without monitor: YES    Last MEWS: 1 Time completed: 1724    Deterioration Index: 25.32    Vitals:    10/22/24 1314 10/22/24 1724   BP: (!) 184/91 (!) 162/75   Pulse: 64 72   Resp: 14 16   Temp: 97.7 °F (36.5 °C) 98.2 °F (36.8 °C)   TempSrc: Oral Oral   SpO2: 97% 96%   Weight: 64.3 kg (141 lb 11.2 oz)        Opportunity for questions and clarification was provided.

## 2024-10-22 NOTE — ED PROVIDER NOTES
family at bedside, social support available    Acute or chronic illness limiting or affecting care: Likely metabolic encephalopathy, history needed to be taken by family    ------------------------- PAST HISTORY -------------------------    I personally reviewed the following history:    Past Medical History:  has a past medical history of Arthritis, Atrial fibrillation (HCC), Carotid stenosis, right, Cerebral artery occlusion with cerebral infarction (HCC), Glaucoma, History of blood transfusion, Hx of blood clots, Hyperlipidemia, Hypertension, IBS (irritable bowel syndrome), Nausea, Nosebleed, and UTI (lower urinary tract infection).    Past Surgical History:  has a past surgical history that includes Appendectomy; eye surgery (Bilateral); Carotid endarterectomy (Left, 8/22/2019); and hip surgery (Left, 10/31/2023).    Social History:  reports that she quit smoking about 38 years ago. Her smoking use included cigarettes. She has never used smokeless tobacco. She reports that she does not drink alcohol and does not use drugs.    Family History: family history includes Other in her father; Other (age of onset: 84) in her mother.     The patient’s home medications have been reviewed.    Allergies: E.e.s. [erythromycin]    ------------------------- NURSING NOTES AND VITALS REVIEWED ---------------------------  Date / Time Roomed:  10/22/2024 12:50 PM  ED Bed Assignment:  24/24    The nursing notes within the ED encounter and vital signs as below have been reviewed.   Patient Vitals for the past 24 hrs:   BP Temp Temp src Pulse Resp SpO2 Weight   10/22/24 1314 (!) 184/91 97.7 °F (36.5 °C) Oral 64 14 97 % 64.3 kg (141 lb 11.2 oz)       ------------------------------ RESULTS -----------------------------    Medications administered today:  Medications   cefTRIAXone (ROCEPHIN) 2,000 mg in sterile water 20 mL IV syringe (has no administration in time range)   doxycycline (VIBRAMYCIN) 100 mg in sodium chloride 0.9 % 100 mL  see above.  Concern for pneumonia on imaging.  No UTI.  Vitals are stable.  Concern with patient's ability to take medicine at home due to nausea, she is also weak unable to ambulate.  Family also concerned with confusion.  This happened in the past with UTI, received IV antibiotics and then was able to be discharged after feeling better.  Discussed case with internal medicine, plan to admit with IV antibiotics and further workup.    Clinical Impression  1. Metabolic encephalopathy    2. Pneumonia of right lower lobe due to infectious organism         Disposition  Patient's disposition: Admit to telemetry    Alem Fajardo MD  Resident PGY-3

## 2024-10-23 LAB
ANION GAP SERPL CALCULATED.3IONS-SCNC: 12 MMOL/L (ref 7–16)
BASOPHILS # BLD: 0.05 K/UL (ref 0–0.2)
BASOPHILS NFR BLD: 1 % (ref 0–2)
BUN SERPL-MCNC: 22 MG/DL (ref 6–23)
CALCIUM SERPL-MCNC: 8.4 MG/DL (ref 8.6–10.2)
CHLORIDE SERPL-SCNC: 104 MMOL/L (ref 98–107)
CHOLEST SERPL-MCNC: 100 MG/DL
CO2 SERPL-SCNC: 21 MMOL/L (ref 22–29)
CREAT SERPL-MCNC: 1.1 MG/DL (ref 0.5–1)
EOSINOPHIL # BLD: 0.18 K/UL (ref 0.05–0.5)
EOSINOPHILS RELATIVE PERCENT: 4 % (ref 0–6)
ERYTHROCYTE [DISTWIDTH] IN BLOOD BY AUTOMATED COUNT: 17.4 % (ref 11.5–15)
FERRITIN SERPL-MCNC: 40 NG/ML
GFR, ESTIMATED: 52 ML/MIN/1.73M2
GLUCOSE SERPL-MCNC: 102 MG/DL (ref 74–99)
HCT VFR BLD AUTO: 34.9 % (ref 34–48)
HDLC SERPL-MCNC: 39 MG/DL
HGB BLD-MCNC: 10.8 G/DL (ref 11.5–15.5)
IMM GRANULOCYTES # BLD AUTO: <0.03 K/UL (ref 0–0.58)
IMM GRANULOCYTES NFR BLD: 0 % (ref 0–5)
IRON SATN MFR SERPL: 19 % (ref 15–50)
IRON SERPL-MCNC: 84 UG/DL (ref 37–145)
LDLC SERPL CALC-MCNC: 42 MG/DL
LYMPHOCYTES NFR BLD: 0.94 K/UL (ref 1.5–4)
LYMPHOCYTES RELATIVE PERCENT: 20 % (ref 20–42)
MAGNESIUM SERPL-MCNC: 2 MG/DL (ref 1.6–2.6)
MCH RBC QN AUTO: 25.3 PG (ref 26–35)
MCHC RBC AUTO-ENTMCNC: 30.9 G/DL (ref 32–34.5)
MCV RBC AUTO: 81.7 FL (ref 80–99.9)
MONOCYTES NFR BLD: 0.45 K/UL (ref 0.1–0.95)
MONOCYTES NFR BLD: 10 % (ref 2–12)
NEUTROPHILS NFR BLD: 65 % (ref 43–80)
NEUTS SEG NFR BLD: 3.03 K/UL (ref 1.8–7.3)
PLATELET # BLD AUTO: 147 K/UL (ref 130–450)
PMV BLD AUTO: 10 FL (ref 7–12)
POTASSIUM SERPL-SCNC: 3.2 MMOL/L (ref 3.5–5)
RBC # BLD AUTO: 4.27 M/UL (ref 3.5–5.5)
SODIUM SERPL-SCNC: 137 MMOL/L (ref 132–146)
TIBC SERPL-MCNC: 438 UG/DL (ref 250–450)
TRIGL SERPL-MCNC: 96 MG/DL
VLDLC SERPL CALC-MCNC: 19 MG/DL
WBC OTHER # BLD: 4.7 K/UL (ref 4.5–11.5)

## 2024-10-23 PROCEDURE — 97165 OT EVAL LOW COMPLEX 30 MIN: CPT

## 2024-10-23 PROCEDURE — 6360000002 HC RX W HCPCS: Performed by: INTERNAL MEDICINE

## 2024-10-23 PROCEDURE — 2060000000 HC ICU INTERMEDIATE R&B

## 2024-10-23 PROCEDURE — 2580000003 HC RX 258: Performed by: INTERNAL MEDICINE

## 2024-10-23 PROCEDURE — 6370000000 HC RX 637 (ALT 250 FOR IP): Performed by: NURSE PRACTITIONER

## 2024-10-23 PROCEDURE — 97161 PT EVAL LOW COMPLEX 20 MIN: CPT

## 2024-10-23 PROCEDURE — 6360000002 HC RX W HCPCS: Performed by: NURSE PRACTITIONER

## 2024-10-23 PROCEDURE — 2580000003 HC RX 258: Performed by: NURSE PRACTITIONER

## 2024-10-23 PROCEDURE — 6370000000 HC RX 637 (ALT 250 FOR IP): Performed by: INTERNAL MEDICINE

## 2024-10-23 PROCEDURE — 83735 ASSAY OF MAGNESIUM: CPT

## 2024-10-23 PROCEDURE — 85025 COMPLETE CBC W/AUTO DIFF WBC: CPT

## 2024-10-23 PROCEDURE — 80048 BASIC METABOLIC PNL TOTAL CA: CPT

## 2024-10-23 PROCEDURE — 80061 LIPID PANEL: CPT

## 2024-10-23 RX ORDER — DOXYCYCLINE 100 MG/1
100 CAPSULE ORAL EVERY 12 HOURS SCHEDULED
Status: DISCONTINUED | OUTPATIENT
Start: 2024-10-23 | End: 2024-10-25 | Stop reason: HOSPADM

## 2024-10-23 RX ADMIN — DORZOLAMIDE HYDROCHLORIDE AND TIMOLOL MALEATE 1 DROP: 20; 5 SOLUTION/ DROPS OPHTHALMIC at 19:47

## 2024-10-23 RX ADMIN — METOPROLOL SUCCINATE 50 MG: 50 TABLET, EXTENDED RELEASE ORAL at 19:53

## 2024-10-23 RX ADMIN — ATORVASTATIN CALCIUM 40 MG: 40 TABLET, FILM COATED ORAL at 19:54

## 2024-10-23 RX ADMIN — QUETIAPINE FUMARATE 100 MG: 100 TABLET ORAL at 19:54

## 2024-10-23 RX ADMIN — APIXABAN 2.5 MG: 2.5 TABLET, FILM COATED ORAL at 19:53

## 2024-10-23 RX ADMIN — SODIUM CHLORIDE, PRESERVATIVE FREE 10 ML: 5 INJECTION INTRAVENOUS at 09:42

## 2024-10-23 RX ADMIN — SERTRALINE HYDROCHLORIDE 25 MG: 50 TABLET ORAL at 19:53

## 2024-10-23 RX ADMIN — METOPROLOL SUCCINATE 50 MG: 50 TABLET, EXTENDED RELEASE ORAL at 09:42

## 2024-10-23 RX ADMIN — DORZOLAMIDE HYDROCHLORIDE AND TIMOLOL MALEATE 1 DROP: 20; 5 SOLUTION/ DROPS OPHTHALMIC at 09:43

## 2024-10-23 RX ADMIN — DONEPEZIL HYDROCHLORIDE 5 MG: 10 TABLET, FILM COATED ORAL at 19:51

## 2024-10-23 RX ADMIN — ASPIRIN 81 MG CHEWABLE TABLET 81 MG: 81 TABLET CHEWABLE at 09:42

## 2024-10-23 RX ADMIN — DOXYCYCLINE HYCLATE 100 MG: 100 CAPSULE ORAL at 21:00

## 2024-10-23 RX ADMIN — BUMETANIDE 0.5 MG: 0.25 INJECTION INTRAMUSCULAR; INTRAVENOUS at 09:42

## 2024-10-23 RX ADMIN — SODIUM CHLORIDE, PRESERVATIVE FREE 10 ML: 5 INJECTION INTRAVENOUS at 19:48

## 2024-10-23 RX ADMIN — LATANOPROST 1 DROP: 50 SOLUTION OPHTHALMIC at 19:48

## 2024-10-23 RX ADMIN — POTASSIUM BICARBONATE 40 MEQ: 782 TABLET, EFFERVESCENT ORAL at 06:33

## 2024-10-23 RX ADMIN — APIXABAN 2.5 MG: 2.5 TABLET, FILM COATED ORAL at 09:42

## 2024-10-23 RX ADMIN — WATER 1000 MG: 1 INJECTION INTRAMUSCULAR; INTRAVENOUS; SUBCUTANEOUS at 17:29

## 2024-10-23 RX ADMIN — ACETAMINOPHEN 650 MG: 325 TABLET ORAL at 00:23

## 2024-10-23 ASSESSMENT — PAIN SCALES - GENERAL
PAINLEVEL_OUTOF10: 0
PAINLEVEL_OUTOF10: 10
PAINLEVEL_OUTOF10: 0
PAINLEVEL_OUTOF10: 0

## 2024-10-23 ASSESSMENT — PAIN DESCRIPTION - ORIENTATION: ORIENTATION: LEFT;RIGHT

## 2024-10-23 ASSESSMENT — PAIN DESCRIPTION - LOCATION: LOCATION: HAND

## 2024-10-23 ASSESSMENT — PAIN DESCRIPTION - DESCRIPTORS: DESCRIPTORS: DISCOMFORT;CRUSHING

## 2024-10-23 NOTE — H&P
History & Physical      PCP: Jesus Tatum MD    Date of Admission: 10/22/2024    Date of Service: Pt seen/examined on 10/23/2024    admitted to Inpatient with expected LOS greater than two midnights due to medical therapy.          Chief Complaint:  had concerns including Altered Mental Status (Increased confusion, hx of dementia).    History Of Present Illness:    Ms. Malia Choi, a 83 y.o. year old female  who  has a past medical history of Arthritis, Atrial fibrillation (HCC), Carotid stenosis, right, Cerebral artery occlusion with cerebral infarction (HCC), Glaucoma, History of blood transfusion, Hx of blood clots, Hyperlipidemia, Hypertension, IBS (irritable bowel syndrome), Nausea, Nosebleed, and UTI (lower urinary tract infection).     This is a 89-year-old female with history as stated above.  She lives at home with her daughter.  She was brought into the ER where she had presented with confusion although no vomiting or nausea.  This has been going on for about 3 days.  The daughter felt that she had a UTI so she brought her to the ER.  There is a concern that she has a premorbid history of dementia and it manifest more when she is sick.  Patient did not complain of shortness of breath.  She is quiteDisoriented but extremely pleasant    Imaging was concern for pneumonic infiltrate.  She was started on IV antibiotics.  She has not received any more doses after the ER she received IV Doxy and IV Rocephin.    Past Medical History:        Diagnosis Date    Arthritis     Atrial fibrillation (HCC)     Carotid stenosis, right     Cerebral artery occlusion with cerebral infarction (HCC)     MEMORY ISSUES, GAIT ISSUES    Glaucoma     History of blood transfusion 1953    rupture appendicitis    Hx of blood clots     developed in leg after appendectomy age 12 yrs     Hyperlipidemia     Hypertension     IBS (irritable bowel syndrome)     Nausea     Nosebleed 08/09/2019    in eor dr ambrosio left nostril packing  all.  Watch carefully for any changes in her vitals so far she has been good.  Due to the pleural effusion appearance we can get a echo was pretty high.  She is on IV Bumex.  Can request cardiology to follow      Diet: ADULT DIET; Regular; No Added Salt (3-4 gm)  Code Status: Full Code  Surrogate decision maker confirmed with patient:   Extended Emergency Contact Information  Primary Emergency Contact: Jennifer Choi  Address: [address] [place], [place] [address] [place] [address]  Home Phone: [phone]  Work Phone: [phone]  Mobile Phone: [phone]  Relation: Child  Preferred language: English  Secondary Emergency Contact: JimboOwen LAINEZ \"Madhu\"  Address: [address] [place] [address] [facility]  Home Phone: [phone]  Mobile Phone: [phone]  Relation: Child  Preferred language: English   needed? No      DVT Prophylaxis: []Lovenox []Heparin [x]PCD [] Warfarin/NOAC []Encouraged ambulation  Disposition: []Med/Surg [x] Intermediate [] ICU/CCU  Admit status: [] Observation [x] Inpatient     +++++++++++++++++++++++++++++++++++++++++++++++++  Mary Gonsalez [place], [place]  +++++++++++++++++++++++++++++++++++++++++++++++++  NOTE: This report was transcribed using voice recognition software. Every effort was made to ensure accuracy; however, inadvertent computerized transcription errors may be present.

## 2024-10-23 NOTE — PROGRESS NOTES
Occupational Therapy    OCCUPATIONAL THERAPY INITIAL EVALUATION    [facility]   [address] [place], [place]         Date:10/23/2024                                                  Patient Name: Malia Choi    MRN: [id number]    : 1940    Room: [address] [place] [address]      Evaluating OT: Nadia Guzman OTR/L   [id number]      Referring Provider:Alyx Tapia APRN - CNP     Specific Provider Orders/Date:OT eval and treat 10/22/2024      Diagnosis:  Metabolic encephalopathy [G93.41]  Fluid overload [E87.70]  Pneumonia of right lower lobe due to infectious organism [J18.9]     Pertinent Medical History: dementia, CVA, HTN. IBS, L hip surgery 10/2023     Precautions:  Fall Risk,      Assessment of current deficits    [x] Functional mobility  [x]ADLs  [x] Strength               [x]Cognition    [x] Functional transfers   [x] IADLs         [x] Safety Awareness   [x]Endurance    [] Fine Coordination              [x] Balance      [] Vision/perception   []Sensation     []Gross Motor Coordination  [] ROM  [] Delirium                   [] Motor Control     OT PLAN OF CARE   OT POC based on physician orders, patient diagnosis and results of clinical assessment    Frequency/Duration  2-4 days/wk for 2 - 4weeks PRN   Specific OT Treatment Interventions to include:   ADL retraining/adapted techniques and AE recommendations to increase functional independence within precautions                    Energy conservation techniques to improve tolerance for selfcare routine   Functional transfer/mobility training/DME recommendations for increased independence, safety and fall prevention         Patient/family education to increase safety and functional independence             Environmental modifications for safe mobility and completion of ADLs                             Therapeutic activity to improve functional performance during ADLs.

## 2024-10-23 NOTE — PROGRESS NOTES
Physical Therapy  Facility/Department: [address] MED SURG  Physical Therapy Initial Assessment    Name: Malia Choi  : 1940  MRN: [id number]  Date of Service: 10/23/2024    Patient Diagnosis(es): The primary encounter diagnosis was Metabolic encephalopathy. Diagnoses of Pneumonia of right lower lobe due to infectious organism and Hypervolemia, unspecified hypervolemia type were also pertinent to this visit.  Past Medical History:  has a past medical history of Arthritis, Atrial fibrillation (HCC), Carotid stenosis, right, Cerebral artery occlusion with cerebral infarction (HCC), Glaucoma, History of blood transfusion, Hx of blood clots, Hyperlipidemia, Hypertension, IBS (irritable bowel syndrome), Nausea, Nosebleed, and UTI (lower urinary tract infection).  Past Surgical History:  has a past surgical history that includes Appendectomy; eye surgery (Bilateral); Carotid endarterectomy (Left, 2019); and hip surgery (Left, 10/31/2023).      Referring provider:  Mary Gibson MD    PT Order:  PT eval and treat     Evaluating PT:  Isaura Carnes, PT, DPT PT [id number]    Room #:  0618/0618-A  Diagnosis:  Metabolic encephalopathy [G93.41]  Fluid overload [E87.70]  Pneumonia of right lower lobe due to infectious organism [J18.9]  Precautions:  fall risk  Equipment Needs:  none.  Pt owns a walker.    SUBJECTIVE:    Pt lives with her daughter in a 1 story home with 2 stairs to enter.  Bed and bath is on first floor.  Pt ambulated with walker PTA.  Daughter is pt's caregiver and assists with all care.     OBJECTIVE:   Initial Evaluation  Date: 10/23 Treatment Short Term/ Long Term   Goals   Was pt agreeable to Eval/treatment? yes     Does pt have pain? None reported     Bed Mobility  Rolling: NT  Supine to sit: Min A  Sit to supine: NT  Scooting: SBA to sitting EOB  Supervision   Transfers Sit to stand: Min A  Stand to sit: CGA  Stand pivot: NT  supervision   Ambulation    15 feet with w/w Min A.  Posterior lean at times.     100+ feet with w/w supervision    Stair negotiation: ascended and descended  NT   2 steps with 1 rail SBA   ROM BLE:  WFL     Strength BLE:  grossly 4/5  Grossly 4+/5   Balance Sitting EOB:  SBA  Dynamic Standing:  Min A with w/w  Sitting EOB:  supervision  Dynamic Standing:  Supervision with w/w   AM-PAC 6 Clicks 17/24       Orientation:  impaired  Sensation:  Pt denies numbness and tingling to extremities    Patient education  Pt educated on PT objectives during eval and while in the hospital, hand placement during transfers, posture when standing, calling for assistance.    Patient response to education:   Pt verbalized understanding Pt demonstrated skill Pt requires further education in this area   yes With cueing yes     ASSESSMENT:    Conditions Requiring Skilled Therapeutic Intervention:    [x]Decreased strength     []Decreased ROM  [x]Decreased functional mobility  [x]Decreased balance   [x]Decreased endurance   [x]Decreased posture  []Decreased sensation  []Decreased coordination   []Decreased vision  []Decreased safety awareness   []Increased pain       Comments:  Pt found in bed with her daughter present.  No report of dizziness during functional mobility.  Cueing required for hand placement during transfers.  Pt incontiant of bowel while in bed and assisted with personal hygiene when standing.  Pt with posterior lean when standing from the bed and a couple times during ambulation.  Pt also lifting the walker off the floor.  No LOB during ambulation.  Pt reported feeling weak during mobility.   At end of eval, pt left sitting up in the chair with call light in reach, daughter present and chair alarm on.      Pt's/ family goals   1. None stated    Prognosis is good for reaching above PT goals.    Patient and or family understand(s) diagnosis, prognosis, and plan of care.  yes    PHYSICAL THERAPY PLAN OF CARE:    PT POC is established based on physician order and patient diagnosis     Diagnosis:

## 2024-10-23 NOTE — CARE COORDINATION
Social work / Discharge planning:          Social work met with patient and her daughter Sheba for initial assessment.   They live together in a ranch home and patient uses a walker for mobility.    Sheba identifies herself as the primary caregiver.     Patient had a past stay at [facility], [facility] and has used [facility] .     PT/OT evaluations ordered.   Electronically signed by CATALINA Farrell on 10/23/2024 at 1:06 PM

## 2024-10-23 NOTE — PLAN OF CARE
Problem: ABCDS Injury Assessment  Goal: Absence of physical injury  10/22/2024 2145 by Isma Diamond RN  Outcome: Progressing  10/22/2024 1846 by Jennifer Bowens RN  Outcome: Progressing     Problem: Discharge Planning  Goal: Discharge to home or other facility with appropriate resources  10/22/2024 2145 by Isma Diamond RN  Outcome: Progressing  10/22/2024 1846 by Jennifer Bowens RN  Outcome: Progressing     Problem: Chronic Conditions and Co-morbidities  Goal: Patient's chronic conditions and co-morbidity symptoms are monitored and maintained or improved  10/22/2024 2145 by Isma Diamond RN  Outcome: Progressing  10/22/2024 1846 by Jennifer Bowens RN  Outcome: Progressing     Problem: Safety - Adult  Goal: Free from fall injury  Outcome: Progressing     Problem: Skin/Tissue Integrity  Goal: Absence of new skin breakdown  Description: 1.  Monitor for areas of redness and/or skin breakdown  2.  Assess vascular access sites hourly  3.  Every 4-6 hours minimum:  Change oxygen saturation probe site  4.  Every 4-6 hours:  If on nasal continuous positive airway pressure, respiratory therapy assess nares and determine need for appliance change or resting period.  Outcome: Progressing

## 2024-10-23 NOTE — CONSULTS
CHF Nutrition Education    Consult received for heart failure diet education.  Education deferred or not appropriate at this time related to patient with altered mental status.      Electronically signed by Miranda D'Amico, RD, LD on 10/23/2024 at 8:00 AM

## 2024-10-23 NOTE — PLAN OF CARE
Problem: ABCDS Injury Assessment  Goal: Absence of physical injury  10/23/2024 0950 by Ginny Saleh RN  Outcome: Progressing  10/22/2024 2145 by Isma Diamond RN  Outcome: Progressing     Problem: Discharge Planning  Goal: Discharge to home or other facility with appropriate resources  10/23/2024 0950 by Ginny Saleh RN  Outcome: Progressing  10/22/2024 2145 by Isma Diamond RN  Outcome: Progressing     Problem: Chronic Conditions and Co-morbidities  Goal: Patient's chronic conditions and co-morbidity symptoms are monitored and maintained or improved  10/23/2024 0950 by Ginny Saleh RN  Outcome: Progressing  10/22/2024 2145 by Isma Diamond RN  Outcome: Progressing     Problem: Safety - Adult  Goal: Free from fall injury  10/23/2024 0950 by Ginny Saleh RN  Outcome: Progressing  10/22/2024 2145 by Isma Diamond RN  Outcome: Progressing     Problem: Skin/Tissue Integrity  Goal: Absence of new skin breakdown  Description: 1.  Monitor for areas of redness and/or skin breakdown  2.  Assess vascular access sites hourly  3.  Every 4-6 hours minimum:  Change oxygen saturation probe site  4.  Every 4-6 hours:  If on nasal continuous positive airway pressure, respiratory therapy assess nares and determine need for appliance change or resting period.  10/23/2024 0950 by Ginny Saleh RN  Outcome: Progressing  10/22/2024 2145 by Isma Diamond RN  Outcome: Progressing

## 2024-10-23 NOTE — ACP (ADVANCE CARE PLANNING)
Advance Care Planning   Healthcare Decision Maker:    Primary Decision Maker: Jennifer Choi - Child - [phone]    Click here to complete Healthcare Decision Makers including selection of the Healthcare Decision Maker Relationship (ie \"Primary\").

## 2024-10-24 ENCOUNTER — APPOINTMENT (OUTPATIENT)
Age: 84
DRG: 205 | End: 2024-10-24
Attending: INTERNAL MEDICINE
Payer: MEDICARE

## 2024-10-24 PROBLEM — G93.41 METABOLIC ENCEPHALOPATHY: Status: ACTIVE | Noted: 2024-10-24

## 2024-10-24 LAB
ANION GAP SERPL CALCULATED.3IONS-SCNC: 13 MMOL/L (ref 7–16)
BASOPHILS # BLD: 0.04 K/UL (ref 0–0.2)
BASOPHILS NFR BLD: 1 % (ref 0–2)
BNP SERPL-MCNC: 6186 PG/ML (ref 0–450)
BUN SERPL-MCNC: 24 MG/DL (ref 6–23)
CALCIUM SERPL-MCNC: 8.7 MG/DL (ref 8.6–10.2)
CHLORIDE SERPL-SCNC: 102 MMOL/L (ref 98–107)
CO2 SERPL-SCNC: 22 MMOL/L (ref 22–29)
CREAT SERPL-MCNC: 1.1 MG/DL (ref 0.5–1)
ECHO AO ASC DIAM: 3.1 CM
ECHO AO ASCENDING AORTA INDEX: 1.91 CM/M2
ECHO AR MAX VEL PISA: 4.3 M/S
ECHO AV AREA PEAK VELOCITY: 1.9 CM2
ECHO AV AREA VTI: 2.1 CM2
ECHO AV AREA/BSA PEAK VELOCITY: 1.2 CM2/M2
ECHO AV AREA/BSA VTI: 1.3 CM2/M2
ECHO AV CUSP MM: 1.5 CM
ECHO AV MEAN GRADIENT: 4 MMHG
ECHO AV MEAN VELOCITY: 0.9 M/S
ECHO AV PEAK GRADIENT: 7 MMHG
ECHO AV PEAK VELOCITY: 1.3 M/S
ECHO AV REGURGITANT PHT: 520.4 MILLISECOND
ECHO AV VELOCITY RATIO: 0.62
ECHO AV VTI: 25.1 CM
ECHO EST RA PRESSURE: 3 MMHG
ECHO LA DIAMETER INDEX: 2.41 CM/M2
ECHO LA DIAMETER: 3.9 CM
ECHO LA VOL A-L A2C: 99 ML (ref 22–52)
ECHO LA VOL A-L A4C: 89 ML (ref 22–52)
ECHO LA VOL MOD A2C: 98 ML (ref 22–52)
ECHO LA VOL MOD A4C: 84 ML (ref 22–52)
ECHO LA VOLUME AREA LENGTH: 98 ML
ECHO LA VOLUME INDEX A-L A2C: 61 ML/M2 (ref 16–34)
ECHO LA VOLUME INDEX A-L A4C: 55 ML/M2 (ref 16–34)
ECHO LA VOLUME INDEX AREA LENGTH: 60 ML/M2 (ref 16–34)
ECHO LA VOLUME INDEX MOD A2C: 60 ML/M2 (ref 16–34)
ECHO LA VOLUME INDEX MOD A4C: 52 ML/M2 (ref 16–34)
ECHO LV E' LATERAL VELOCITY: 11 CM/S
ECHO LV E' SEPTAL VELOCITY: 4 CM/S
ECHO LV EDV A2C: 63 ML
ECHO LV EDV A4C: 59 ML
ECHO LV EDV BP: 63 ML (ref 56–104)
ECHO LV EDV INDEX A4C: 36 ML/M2
ECHO LV EDV INDEX BP: 39 ML/M2
ECHO LV EDV NDEX A2C: 39 ML/M2
ECHO LV EJECTION FRACTION A2C: 57 %
ECHO LV EJECTION FRACTION A4C: 45 %
ECHO LV EJECTION FRACTION BIPLANE: 52 % (ref 55–100)
ECHO LV ESV A2C: 27 ML
ECHO LV ESV A4C: 33 ML
ECHO LV ESV BP: 30 ML (ref 19–49)
ECHO LV ESV INDEX A2C: 17 ML/M2
ECHO LV ESV INDEX A4C: 20 ML/M2
ECHO LV ESV INDEX BP: 19 ML/M2
ECHO LV FRACTIONAL SHORTENING: 20 % (ref 28–44)
ECHO LV INTERNAL DIMENSION DIASTOLE INDEX: 2.72 CM/M2
ECHO LV INTERNAL DIMENSION DIASTOLIC: 4.4 CM (ref 3.9–5.3)
ECHO LV INTERNAL DIMENSION SYSTOLIC INDEX: 2.16 CM/M2
ECHO LV INTERNAL DIMENSION SYSTOLIC: 3.5 CM
ECHO LV ISOVOLUMETRIC RELAXATION TIME (IVRT): 63.1 MS
ECHO LV IVSD: 1.1 CM (ref 0.6–0.9)
ECHO LV IVSS: 1.1 CM
ECHO LV MASS 2D: 147.8 G (ref 67–162)
ECHO LV MASS INDEX 2D: 91.3 G/M2 (ref 43–95)
ECHO LV POSTERIOR WALL DIASTOLIC: 0.9 CM (ref 0.6–0.9)
ECHO LV POSTERIOR WALL SYSTOLIC: 1.4 CM
ECHO LV RELATIVE WALL THICKNESS RATIO: 0.41
ECHO LVOT AREA: 3.1 CM2
ECHO LVOT AV VTI INDEX: 0.68
ECHO LVOT DIAM: 2 CM
ECHO LVOT MEAN GRADIENT: 1 MMHG
ECHO LVOT PEAK GRADIENT: 3 MMHG
ECHO LVOT PEAK VELOCITY: 0.8 M/S
ECHO LVOT STROKE VOLUME INDEX: 33 ML/M2
ECHO LVOT SV: 53.4 ML
ECHO LVOT VTI: 17 CM
ECHO MV AREA PHT: 3.5 CM2
ECHO MV AREA VTI: 2.2 CM2
ECHO MV E DECELERATION TIME (DT): 197.1 MS
ECHO MV LVOT VTI INDEX: 1.4
ECHO MV MAX VELOCITY: 1 M/S
ECHO MV MEAN GRADIENT: 1 MMHG
ECHO MV MEAN VELOCITY: 0.5 M/S
ECHO MV PEAK GRADIENT: 4 MMHG
ECHO MV PRESSURE HALF TIME (PHT): 62.6 MS
ECHO MV VTI: 23.8 CM
ECHO PULMONARY ARTERY END DIASTOLIC PRESSURE: 12 MMHG
ECHO PV MAX VELOCITY: 0.8 M/S
ECHO PV MEAN GRADIENT: 1 MMHG
ECHO PV MEAN VELOCITY: 0.5 M/S
ECHO PV PEAK GRADIENT: 2 MMHG
ECHO PV REGURGITANT MAX VELOCITY: 1.7 M/S
ECHO PV VTI: 15.2 CM
ECHO PVEIN PEAK D VELOCITY: 0.8 M/S
ECHO PVEIN PEAK S VELOCITY: 0.3 M/S
ECHO PVEIN S/D RATIO: 0.4
ECHO RIGHT VENTRICULAR SYSTOLIC PRESSURE (RVSP): 51 MMHG
ECHO RV INTERNAL DIMENSION: 3.4 CM
ECHO RV TAPSE: 1.2 CM (ref 1.7–?)
ECHO TV REGURGITANT MAX VELOCITY: 3.47 M/S
ECHO TV REGURGITANT PEAK GRADIENT: 48 MMHG
EOSINOPHIL # BLD: 0.25 K/UL (ref 0.05–0.5)
EOSINOPHILS RELATIVE PERCENT: 5 % (ref 0–6)
ERYTHROCYTE [DISTWIDTH] IN BLOOD BY AUTOMATED COUNT: 17.5 % (ref 11.5–15)
GFR, ESTIMATED: 49 ML/MIN/1.73M2
GLUCOSE SERPL-MCNC: 106 MG/DL (ref 74–99)
HCT VFR BLD AUTO: 38.9 % (ref 34–48)
HGB BLD-MCNC: 12.1 G/DL (ref 11.5–15.5)
IMM GRANULOCYTES # BLD AUTO: <0.03 K/UL (ref 0–0.58)
IMM GRANULOCYTES NFR BLD: 0 % (ref 0–5)
LYMPHOCYTES NFR BLD: 1.07 K/UL (ref 1.5–4)
LYMPHOCYTES RELATIVE PERCENT: 19 % (ref 20–42)
MAGNESIUM SERPL-MCNC: 2.1 MG/DL (ref 1.6–2.6)
MCH RBC QN AUTO: 25.1 PG (ref 26–35)
MCHC RBC AUTO-ENTMCNC: 31.1 G/DL (ref 32–34.5)
MCV RBC AUTO: 80.5 FL (ref 80–99.9)
MONOCYTES NFR BLD: 0.58 K/UL (ref 0.1–0.95)
MONOCYTES NFR BLD: 10 % (ref 2–12)
NEUTROPHILS NFR BLD: 65 % (ref 43–80)
NEUTS SEG NFR BLD: 3.62 K/UL (ref 1.8–7.3)
PLATELET # BLD AUTO: 171 K/UL (ref 130–450)
PMV BLD AUTO: 10.3 FL (ref 7–12)
POTASSIUM SERPL-SCNC: 3.7 MMOL/L (ref 3.5–5)
RBC # BLD AUTO: 4.83 M/UL (ref 3.5–5.5)
SODIUM SERPL-SCNC: 137 MMOL/L (ref 132–146)
WBC OTHER # BLD: 5.6 K/UL (ref 4.5–11.5)

## 2024-10-24 PROCEDURE — 99223 1ST HOSP IP/OBS HIGH 75: CPT | Performed by: INTERNAL MEDICINE

## 2024-10-24 PROCEDURE — 2580000003 HC RX 258: Performed by: NURSE PRACTITIONER

## 2024-10-24 PROCEDURE — 6370000000 HC RX 637 (ALT 250 FOR IP): Performed by: NURSE PRACTITIONER

## 2024-10-24 PROCEDURE — 85025 COMPLETE CBC W/AUTO DIFF WBC: CPT

## 2024-10-24 PROCEDURE — 6360000002 HC RX W HCPCS: Performed by: NURSE PRACTITIONER

## 2024-10-24 PROCEDURE — 6370000000 HC RX 637 (ALT 250 FOR IP): Performed by: INTERNAL MEDICINE

## 2024-10-24 PROCEDURE — 83880 ASSAY OF NATRIURETIC PEPTIDE: CPT

## 2024-10-24 PROCEDURE — 2060000000 HC ICU INTERMEDIATE R&B

## 2024-10-24 PROCEDURE — 93306 TTE W/DOPPLER COMPLETE: CPT

## 2024-10-24 PROCEDURE — 93306 TTE W/DOPPLER COMPLETE: CPT | Performed by: INTERNAL MEDICINE

## 2024-10-24 PROCEDURE — 83735 ASSAY OF MAGNESIUM: CPT

## 2024-10-24 PROCEDURE — 6370000000 HC RX 637 (ALT 250 FOR IP)

## 2024-10-24 PROCEDURE — APPSS60 APP SPLIT SHARED TIME 46-60 MINUTES

## 2024-10-24 PROCEDURE — 80048 BASIC METABOLIC PNL TOTAL CA: CPT

## 2024-10-24 PROCEDURE — 2580000003 HC RX 258: Performed by: INTERNAL MEDICINE

## 2024-10-24 PROCEDURE — 6360000002 HC RX W HCPCS: Performed by: INTERNAL MEDICINE

## 2024-10-24 RX ORDER — SPIRONOLACTONE 25 MG/1
12.5 TABLET ORAL DAILY
Status: DISCONTINUED | OUTPATIENT
Start: 2024-10-24 | End: 2024-10-25 | Stop reason: HOSPADM

## 2024-10-24 RX ADMIN — LATANOPROST 1 DROP: 50 SOLUTION OPHTHALMIC at 20:18

## 2024-10-24 RX ADMIN — DORZOLAMIDE HYDROCHLORIDE AND TIMOLOL MALEATE 1 DROP: 20; 5 SOLUTION/ DROPS OPHTHALMIC at 08:51

## 2024-10-24 RX ADMIN — BUMETANIDE 0.5 MG: 0.25 INJECTION INTRAMUSCULAR; INTRAVENOUS at 08:40

## 2024-10-24 RX ADMIN — DOXYCYCLINE HYCLATE 100 MG: 100 CAPSULE ORAL at 20:16

## 2024-10-24 RX ADMIN — QUETIAPINE FUMARATE 100 MG: 100 TABLET ORAL at 20:17

## 2024-10-24 RX ADMIN — SPIRONOLACTONE 12.5 MG: 25 TABLET ORAL at 16:42

## 2024-10-24 RX ADMIN — APIXABAN 2.5 MG: 2.5 TABLET, FILM COATED ORAL at 08:40

## 2024-10-24 RX ADMIN — SODIUM CHLORIDE, PRESERVATIVE FREE 10 ML: 5 INJECTION INTRAVENOUS at 08:43

## 2024-10-24 RX ADMIN — SODIUM CHLORIDE, PRESERVATIVE FREE 10 ML: 5 INJECTION INTRAVENOUS at 20:18

## 2024-10-24 RX ADMIN — METOPROLOL SUCCINATE 50 MG: 50 TABLET, EXTENDED RELEASE ORAL at 20:17

## 2024-10-24 RX ADMIN — SERTRALINE HYDROCHLORIDE 25 MG: 50 TABLET ORAL at 20:16

## 2024-10-24 RX ADMIN — DOXYCYCLINE HYCLATE 100 MG: 100 CAPSULE ORAL at 08:40

## 2024-10-24 RX ADMIN — DORZOLAMIDE HYDROCHLORIDE AND TIMOLOL MALEATE 1 DROP: 20; 5 SOLUTION/ DROPS OPHTHALMIC at 20:18

## 2024-10-24 RX ADMIN — METOPROLOL SUCCINATE 50 MG: 50 TABLET, EXTENDED RELEASE ORAL at 08:40

## 2024-10-24 RX ADMIN — DONEPEZIL HYDROCHLORIDE 5 MG: 10 TABLET, FILM COATED ORAL at 20:17

## 2024-10-24 RX ADMIN — ASPIRIN 81 MG CHEWABLE TABLET 81 MG: 81 TABLET CHEWABLE at 08:40

## 2024-10-24 RX ADMIN — APIXABAN 2.5 MG: 2.5 TABLET, FILM COATED ORAL at 20:16

## 2024-10-24 RX ADMIN — WATER 1000 MG: 1 INJECTION INTRAMUSCULAR; INTRAVENOUS; SUBCUTANEOUS at 15:26

## 2024-10-24 RX ADMIN — ATORVASTATIN CALCIUM 40 MG: 40 TABLET, FILM COATED ORAL at 20:16

## 2024-10-24 ASSESSMENT — PAIN SCALES - GENERAL: PAINLEVEL_OUTOF10: 0

## 2024-10-24 NOTE — PLAN OF CARE
Problem: ABCDS Injury Assessment  Goal: Absence of physical injury  10/24/2024 1027 by Oumar Bernabe RN  Outcome: Progressing  10/23/2024 2142 by Isma Diamond RN  Outcome: Progressing     Problem: Discharge Planning  Goal: Discharge to home or other facility with appropriate resources  10/24/2024 1027 by Oumar Bernabe RN  Outcome: Progressing  10/23/2024 2142 by Isma Diamond RN  Outcome: Progressing     Problem: Chronic Conditions and Co-morbidities  Goal: Patient's chronic conditions and co-morbidity symptoms are monitored and maintained or improved  10/24/2024 1027 by Oumar Bernabe RN  Outcome: Progressing  10/23/2024 2142 by Isma Diamond RN  Outcome: Progressing     Problem: Safety - Adult  Goal: Free from fall injury  10/24/2024 1027 by Oumar Bernabe RN  Outcome: Progressing  10/23/2024 2142 by Isma Diamond RN  Outcome: Progressing     Problem: Skin/Tissue Integrity  Goal: Absence of new skin breakdown  Description: 1.  Monitor for areas of redness and/or skin breakdown  2.  Assess vascular access sites hourly  3.  Every 4-6 hours minimum:  Change oxygen saturation probe site  4.  Every 4-6 hours:  If on nasal continuous positive airway pressure, respiratory therapy assess nares and determine need for appliance change or resting period.  10/24/2024 1027 by Oumar Bernabe RN  Outcome: Progressing  10/23/2024 2142 by Isma Diamond RN  Outcome: Progressing

## 2024-10-24 NOTE — PLAN OF CARE
Problem: ABCDS Injury Assessment  Goal: Absence of physical injury  10/23/2024 2142 by Isma Diamond RN  Outcome: Progressing  10/23/2024 0950 by Ginny Saleh RN  Outcome: Progressing  Flowsheets (Taken 10/23/2024 0745)  Absence of Physical Injury: Implement safety measures based on patient assessment     Problem: Discharge Planning  Goal: Discharge to home or other facility with appropriate resources  10/23/2024 2142 by Isma Diamond RN  Outcome: Progressing  10/23/2024 0950 by Ginny Saleh RN  Outcome: Progressing     Problem: Chronic Conditions and Co-morbidities  Goal: Patient's chronic conditions and co-morbidity symptoms are monitored and maintained or improved  10/23/2024 2142 by Isma Diamond RN  Outcome: Progressing  10/23/2024 0950 by Ginny Saleh RN  Outcome: Progressing     Problem: Safety - Adult  Goal: Free from fall injury  10/23/2024 2142 by Isma Diamond RN  Outcome: Progressing  10/23/2024 0950 by Ginny Saleh RN  Outcome: Progressing  Flowsheets (Taken 10/23/2024 0745)  Free From Fall Injury:   Instruct family/caregiver on patient safety   Based on caregiver fall risk screen, instruct family/caregiver to ask for assistance with transferring infant if caregiver noted to have fall risk factors     Problem: Skin/Tissue Integrity  Goal: Absence of new skin breakdown  Description: 1.  Monitor for areas of redness and/or skin breakdown  2.  Assess vascular access sites hourly  3.  Every 4-6 hours minimum:  Change oxygen saturation probe site  4.  Every 4-6 hours:  If on nasal continuous positive airway pressure, respiratory therapy assess nares and determine need for appliance change or resting period.  10/23/2024 2142 by Isma Diamond RN  Outcome: Progressing  10/23/2024 0950 by Ginny Saleh RN  Outcome: Progressing

## 2024-10-24 NOTE — PROGRESS NOTES
[facility]                                Progress note    Subjective:    The patient is awake and alert.    Resting comfortably in no apparent acute distress  She is with significant dementia and not really able to answer questions appropriately but is very pleasant on evaluation  Daughter is at bedside with whom case was discussed-daughter indicates patient has been way more confused than is her baseline-no fevers or chills at home or reported    Objective:    /70   Pulse 68   Temp 98.7 °F (37.1 °C) (Oral)   Resp 16   Ht 1.575 m (5' 2\")   Wt 61.2 kg (135 lb)   SpO2 98%   BMI 24.69 kg/m²     In: 720 [P.O.:720]  Out: 1100   In: 720   Out: 1100 [Urine:1100]    General appearance: NAD, conversant, but with dementia  HEENT: AT/NC, MMM  Neck: FROM, supple  Lungs: Clear to auscultation-no rales, no rhonchi no wheeze  CV: RRR, no MRGs  Vasc: Radial pulses 2+  Abdomen: Soft, non-tender; no masses or HSM  Extremities: No peripheral edema or digital cyanosis  Skin: no rash, lesions or ulcers    Recent Labs     10/22/24  1338 10/23/24  0305 10/24/24  0300   WBC 4.6 4.7 5.6   HGB 11.3* 10.8* 12.1   HCT 37.2 34.9 38.9    147 171       Recent Labs     10/22/24  1338 10/23/24  0305 10/24/24  0300    137 137   K 3.8 3.2* 3.7    104 102   CO2 22 21* 22   BUN 19 22 24*   CREATININE 1.1* 1.1* 1.1*   CALCIUM 8.8 8.4* 8.7     10/22/2024 CXR:  1. Right lower lobe atelectasis/pneumonia and small right pleural effusion,  both of which are new.  2. Stable cardiomegaly.      ASSESSMENT:     Principal Problem:    Fluid overload  Resolved Problems:    * No resolved hospital problems. *  Pneumonia  Hypokalemia  CKD stage IIIa  Anemia        PLAN:     She is stable.  Confusion is definitely present.  Nonfocal.  Chest x-ray showed possible pneumonic infiltrate.  We can pursue that line of treatment.  CKD at baseline with appropriate anemia due to CKD.  Does not appear to be in CHF at

## 2024-10-24 NOTE — CONSULTS
Inpatient Cardiology Consultation      Reason for Consult:  CHF    Consulting Physician: Dr Musa     Requesting Physician:  Mary Gibson MD    Date of Consultation: 10/24/2024    HISTORY OF PRESENT ILLNESS:   Patient is an 83 year olf female who was previously seen by Dr Coyne inpatient only and mainly follows with [facility] cardiology.  Last seen outpatient at [facility] 9/21/2023 with plan to continue low-dose apixaban/aspirin indefinitely unless future bleeding limits treatment.    HPI:  Patient presented to ER 10/22/2024 for increased confusion and nausea x 3 days.  Daughter at bedside provides history and reports she gets like this when she has a UTI.  Patient disoriented on exam in ER.  Patient with no UTI but found to have concern for PNA and was started on ATB.  Admitted for PNA and metabolic encephalopathy.  Patient with elevated BNP initially 11,000 with repeat of 6000.  CXR also showing new right pleural effusion.  Primary service concern for new CHF and echo was ordered.  Diuresis with 0.5 IV Bumex x 3 thus far.  Intake and output: -960 mL.  VS upon arrival 97.7, 14 respirations, 64 pulse, 184/91, 97% room air.  Labs: Potassium 3.7, BUN 24, creatinine 1.1, GFR 49, magnesium 2.1, glucose 106, proBNP 11,000 > 6000 (10/2023: 7900), cholesterol 100, HDL 39, LDL 42, triglycerides 96, albumin 3.9, alk phos 107, direct bilirubin 0.4, TSH 4.32, T4 free 0.9, WBC 5.6, H&H 12/38.9, platelet 171, UA no leukocytes or nitrates.  CXR: Right lower lobe atelectasis/pneumonia and small right pleural effusion both of which are new.  Stable cardiomegaly    Upon assessment today 10/24/2024 patient is lying Semi-Zhu in hospital bed on room air.  Family at bedside.  Patient is alert and oriented to self only.  Family reports patient has been more acutely confused and lethargic for the last 3 days with concern for infection.  They deny any cough, fever, falls per patient.  Patient is unable to find any history at this time.   atria.Mild MR, moderate TR, moderate AR. RVSP 45 mmhg. Mild KS    Echo Summary 10/24/2024:    Left Ventricle: Low normal left ventricular systolic function with a visually estimated EF of 50 - 55%. Left ventricle size is normal. Normal wall thickness. Normal wall motion.    Right Ventricle: Right ventricle is mildly dilated. Reduced systolic function. TAPSE is 1.2 cm.    Left Atrium: Left atrial volume index is severely increased (>48 mL/m2).    Aortic Valve: Mild to moderate regurgitation. AV PHT is 520.4 millisecond.    Mitral Valve: Mildly thickened leaflets. Mild to moderate regurgitation.    Tricuspid Valve: Moderate regurgitation. Moderately elevated RVSP, consistent with moderate pulmonary hypertension. The estimated RVSP is 51 mmHg.    Pericardium: No pericardial effusion.    See accompanying documentation for full consult.    ASSESSMENT AND PLAN:  Patient Active Problem List   Diagnosis    Posterior choroidal artery infarction (HCC)    Persistent atrial fibrillation (HCC)    Essential hypertension    Mixed hyperlipidemia    Stenosis of left carotid artery    Bilateral carotid artery stenosis    GI bleed    Cerebrovascular accident (CVA) (HCC)    Atrial fibrillation (HCC)    Falls, initial encounter    Fluid overload    Metabolic encephalopathy     1. Acute on chronic diastolic and right-sided CHF:    Chart/labs/EKG/monitor reviewed.     Echo 10/24/2024 with LVEF 50-55%, RV dysfunction, mild-mod AI, mild-mod MR, mod TR, mod PH.     Continue low dose IV daily bumex.    BB. Start low dose spironolactone and titrate as tolerated. Consider SGLT2.     2. Permanent Afib:     Rate control.     Watchman implant 4/17/2023 [facility], however recommended to continue on dose reduced Eliquis/aspirin 81 daily per [facility] indefinitely unless new bleeding concerns presumably due to thromboembolic and hypercoagulable state issues.     3. VHD:    Echo 10/24/2024 with LVEF 50-55%, RV dysfunction, mild-mod AI, mild-mod MR, mod TR, mod

## 2024-10-24 NOTE — CONSULTS
CARDIOLOGY ATTENDING ATTESTATION   I spent > 51% of the total time involved with completing the encounter. The total time included the following:  Independently interviewing the patient (HPI, ROS, PMH, PSH, FMH, SH, allergies, and medications)  Independently performing a medically appropriate examination  Reviewing the above documentation completed by the MABEL  Ordering medications, tests, and/or procedures  Formulating the assessment/plan and reviewing the rationale for the above recommendations  Reviewing available records, results of all previously ordered testing/procedures, and current problem list  Counseling/educating the patient  Coordinating care with other healthcare professionals  Communicating results to the patient's family/caregiver  Documenting clinical information in the patient's electronic health record    Physical Exam   BP (!) 136/58   Pulse 64   Temp 97.7 °F (36.5 °C) (Oral)   Resp 16   Ht 1.575 m (5' 2\")   Wt 61.2 kg (135 lb)   SpO2 99%   BMI 24.69 kg/m²   Constitutional: Oriented to person, place, and time. Well-developed and well-nourished. No distress.    Head: Normocephalic and atraumatic.   Eyes: EOM are normal. Pupils are equal, round, and reactive to light.   Neck: Normal range of motion. Neck supple. No hepatojugular reflux and no JVD present. Carotid bruit is not present. No tracheal deviation present. No thyromegaly present.   Cardiovascular: Normal rate, regular rhythm, normal heart sounds and intact distal pulses.  Exam reveals no gallop and no friction rub.  No murmur heard.  Pulmonary/Chest: Effort normal and breath sounds normal. No respiratory distress. No wheezes. No rales. No tenderness.   Abdominal: Soft. Bowel sounds are normal. No distension and no mass. No tenderness. No rebound and no guarding.   Musculoskeletal: Normal range of motion. No edema and no tenderness.   Lymphadenopathy:   No cervical adenopathy.   Neurological: Alert and oriented to person, place, and  Start low dose spironolactone and titrate as tolerated. Consider SGLT2.     2. Permanent Afib:     Rate control.     Watchman implant 4/17/2023 [facility], however recommended to continue on dose reduced Eliquis/aspirin 81 daily per [facility] indefinitely unless new bleeding concerns presumably due to thromboembolic and hypercoagulable state issues.     3. VHD:    Echo 10/24/2024 with LVEF 50-55%, RV dysfunction, mild-mod AI, mild-mod MR, mod TR, mod PH.     Observe.     4. HTN: Observe.     5. Lipids: Statin.     6. PAD/Carotid stenosis s/p L CEA 2019    7. CVA 9/2022: S/p TNK with residual mild right hand weakness and speech difficulties> in setting of new AF    8. Hx of GIB: Follow labs.     9. Epistaxis s/p cautery    10. Hx of DVT/Protein S deficiency: Anticoagulated.    11. DJD     12. Cataracts/Glaucoma     13. Arthritis    14. IBS    15. Hx of dementia    Available external charts reviewed.   Available imaging and evaluations independently reviewed.   Interviewed and discussed patient with available family.  Discussed case with referring service and non-cardiology consultants.     Heather Musa D.O.  Cardiologist  Cardiology, [facility]

## 2024-10-24 NOTE — PROGRESS NOTES
Attending contacted regarding patient's wish to change her code status from full to DNR. Copies of the living will and other requisite documentation placed in patient's soft chart.

## 2024-10-24 NOTE — CARE COORDINATION
Social work / Discharge planning:           AM PAC 17/24.    Social work offered the option of home care which patient's daughter declined.    Social work explained that if they change their mind at home, the referral will need to be made by the PCP.    Patient's daughter voiced understanding.    Electronically signed by CATALINA Farrell on 10/24/2024 at 1:01 PM

## 2024-10-25 VITALS
HEART RATE: 66 BPM | HEIGHT: 62 IN | RESPIRATION RATE: 16 BRPM | WEIGHT: 135 LBS | SYSTOLIC BLOOD PRESSURE: 142 MMHG | BODY MASS INDEX: 24.84 KG/M2 | OXYGEN SATURATION: 97 % | TEMPERATURE: 98.3 F | DIASTOLIC BLOOD PRESSURE: 67 MMHG

## 2024-10-25 LAB
ANION GAP SERPL CALCULATED.3IONS-SCNC: 11 MMOL/L (ref 7–16)
BASOPHILS # BLD: 0.06 K/UL (ref 0–0.2)
BASOPHILS NFR BLD: 1 % (ref 0–2)
BUN SERPL-MCNC: 27 MG/DL (ref 6–23)
CALCIUM SERPL-MCNC: 9 MG/DL (ref 8.6–10.2)
CHLORIDE SERPL-SCNC: 105 MMOL/L (ref 98–107)
CO2 SERPL-SCNC: 21 MMOL/L (ref 22–29)
CREAT SERPL-MCNC: 1 MG/DL (ref 0.5–1)
EOSINOPHIL # BLD: 0.26 K/UL (ref 0.05–0.5)
EOSINOPHILS RELATIVE PERCENT: 5 % (ref 0–6)
ERYTHROCYTE [DISTWIDTH] IN BLOOD BY AUTOMATED COUNT: 17.7 % (ref 11.5–15)
GFR, ESTIMATED: 56 ML/MIN/1.73M2
GLUCOSE SERPL-MCNC: 87 MG/DL (ref 74–99)
HCT VFR BLD AUTO: 36 % (ref 34–48)
HGB BLD-MCNC: 11.5 G/DL (ref 11.5–15.5)
IMM GRANULOCYTES # BLD AUTO: <0.03 K/UL (ref 0–0.58)
IMM GRANULOCYTES NFR BLD: 0 % (ref 0–5)
LYMPHOCYTES NFR BLD: 1.16 K/UL (ref 1.5–4)
LYMPHOCYTES RELATIVE PERCENT: 20 % (ref 20–42)
MAGNESIUM SERPL-MCNC: 2 MG/DL (ref 1.6–2.6)
MCH RBC QN AUTO: 25.4 PG (ref 26–35)
MCHC RBC AUTO-ENTMCNC: 31.9 G/DL (ref 32–34.5)
MCV RBC AUTO: 79.6 FL (ref 80–99.9)
MONOCYTES NFR BLD: 0.66 K/UL (ref 0.1–0.95)
MONOCYTES NFR BLD: 11 % (ref 2–12)
NEUTROPHILS NFR BLD: 63 % (ref 43–80)
NEUTS SEG NFR BLD: 3.66 K/UL (ref 1.8–7.3)
PLATELET # BLD AUTO: 193 K/UL (ref 130–450)
PMV BLD AUTO: 10.8 FL (ref 7–12)
POTASSIUM SERPL-SCNC: 4.8 MMOL/L (ref 3.5–5)
RBC # BLD AUTO: 4.52 M/UL (ref 3.5–5.5)
SODIUM SERPL-SCNC: 137 MMOL/L (ref 132–146)
WBC OTHER # BLD: 5.8 K/UL (ref 4.5–11.5)

## 2024-10-25 PROCEDURE — 6360000002 HC RX W HCPCS: Performed by: INTERNAL MEDICINE

## 2024-10-25 PROCEDURE — 6370000000 HC RX 637 (ALT 250 FOR IP)

## 2024-10-25 PROCEDURE — 2580000003 HC RX 258: Performed by: NURSE PRACTITIONER

## 2024-10-25 PROCEDURE — 6360000002 HC RX W HCPCS: Performed by: NURSE PRACTITIONER

## 2024-10-25 PROCEDURE — 83735 ASSAY OF MAGNESIUM: CPT

## 2024-10-25 PROCEDURE — 85025 COMPLETE CBC W/AUTO DIFF WBC: CPT

## 2024-10-25 PROCEDURE — 80048 BASIC METABOLIC PNL TOTAL CA: CPT

## 2024-10-25 PROCEDURE — 6370000000 HC RX 637 (ALT 250 FOR IP): Performed by: NURSE PRACTITIONER

## 2024-10-25 PROCEDURE — 2580000003 HC RX 258: Performed by: INTERNAL MEDICINE

## 2024-10-25 PROCEDURE — 99233 SBSQ HOSP IP/OBS HIGH 50: CPT | Performed by: INTERNAL MEDICINE

## 2024-10-25 PROCEDURE — 6370000000 HC RX 637 (ALT 250 FOR IP): Performed by: INTERNAL MEDICINE

## 2024-10-25 RX ORDER — BUMETANIDE 0.5 MG/1
0.5 TABLET ORAL 2 TIMES DAILY
Qty: 60 TABLET | Refills: 3 | Status: SHIPPED | OUTPATIENT
Start: 2024-10-25

## 2024-10-25 RX ORDER — SPIRONOLACTONE 25 MG/1
12.5 TABLET ORAL DAILY
Qty: 30 TABLET | Refills: 3 | Status: SHIPPED | OUTPATIENT
Start: 2024-10-26

## 2024-10-25 RX ORDER — CEFDINIR 300 MG/1
300 CAPSULE ORAL 2 TIMES DAILY
Qty: 10 CAPSULE | Refills: 0 | Status: SHIPPED | OUTPATIENT
Start: 2024-10-25 | End: 2024-10-30

## 2024-10-25 RX ORDER — DOXYCYCLINE 100 MG/1
100 CAPSULE ORAL EVERY 12 HOURS SCHEDULED
Qty: 10 CAPSULE | Refills: 0 | Status: SHIPPED | OUTPATIENT
Start: 2024-10-25 | End: 2024-10-30

## 2024-10-25 RX ADMIN — ASPIRIN 81 MG CHEWABLE TABLET 81 MG: 81 TABLET CHEWABLE at 09:38

## 2024-10-25 RX ADMIN — WATER 1000 MG: 1 INJECTION INTRAMUSCULAR; INTRAVENOUS; SUBCUTANEOUS at 16:22

## 2024-10-25 RX ADMIN — DOXYCYCLINE HYCLATE 100 MG: 100 CAPSULE ORAL at 09:39

## 2024-10-25 RX ADMIN — ONDANSETRON 4 MG: 2 INJECTION, SOLUTION INTRAMUSCULAR; INTRAVENOUS at 14:35

## 2024-10-25 RX ADMIN — METOPROLOL SUCCINATE 50 MG: 50 TABLET, EXTENDED RELEASE ORAL at 09:39

## 2024-10-25 RX ADMIN — SPIRONOLACTONE 12.5 MG: 25 TABLET ORAL at 09:39

## 2024-10-25 RX ADMIN — SODIUM CHLORIDE, PRESERVATIVE FREE 10 ML: 5 INJECTION INTRAVENOUS at 10:02

## 2024-10-25 RX ADMIN — BUMETANIDE 0.5 MG: 0.25 INJECTION INTRAMUSCULAR; INTRAVENOUS at 09:39

## 2024-10-25 RX ADMIN — DORZOLAMIDE HYDROCHLORIDE AND TIMOLOL MALEATE 1 DROP: 20; 5 SOLUTION/ DROPS OPHTHALMIC at 09:38

## 2024-10-25 RX ADMIN — APIXABAN 2.5 MG: 2.5 TABLET, FILM COATED ORAL at 10:01

## 2024-10-25 ASSESSMENT — PAIN SCALES - GENERAL
PAINLEVEL_OUTOF10: 0

## 2024-10-25 NOTE — PROGRESS NOTES
[facility] Cardiology Inpatient Progress Note    Patient is a 83 y.o. female of Jesus Tatum MD seen in hospital follow up.     Chief complaint: CHF    HPI: Some SOB. No CP.    Patient Active Problem List   Diagnosis    Posterior choroidal artery infarction (HCC)    Persistent atrial fibrillation (HCC)    Essential hypertension    Mixed hyperlipidemia    Stenosis of left carotid artery    Bilateral carotid artery stenosis    GI bleed    Cerebrovascular accident (CVA) (HCC)    Atrial fibrillation (HCC)    Falls, initial encounter    Fluid overload    Metabolic encephalopathy       Allergies   Allergen Reactions    E.E.S. [Erythromycin] Nausea And Vomiting       Current Facility-Administered Medications   Medication Dose Route Frequency Provider Last Rate Last Admin    spironolactone (ALDACTONE) tablet 12.5 mg  12.5 mg Oral Daily Jesus Hernandez APRN - CNP   12.5 mg at 10/24/24 1642    cefTRIAXone (ROCEPHIN) 1,000 mg in sterile water 10 mL IV syringe  1,000 mg IntraVENous Q24H Mary Gibson MD   1,000 mg at 10/24/24 1526    doxycycline hyclate (VIBRAMYCIN) capsule 100 mg  100 mg Oral 2 times per day Mary Gibson MD   100 mg at 10/24/24 2016    apixaban (ELIQUIS) tablet 2.5 mg  2.5 mg Oral BID Alyx Tapia APRN - CNP   2.5 mg at 10/24/24 2016    aspirin chewable tablet 81 mg  81 mg Oral QAM Alyx Tapia APRN - CNP   81 mg at 10/24/24 0840    atorvastatin (LIPITOR) tablet 40 mg  40 mg Oral Nightly Alyx Tapia APRN - CNP   40 mg at 10/24/24 2016    donepezil (ARICEPT) tablet 5 mg  5 mg Oral Nightly Alyx Tapia APRN - CNP   5 mg at 10/24/24 2017    dorzolamide-timolol (COSOPT) 2-0.5 % ophthalmic solution 1 drop  1 drop Both Eyes BID Alyx Tapia APRN - CNP   1 drop at 10/24/24 2018    latanoprost (XALATAN) 0.005 % ophthalmic solution 1 drop  1 drop Both Eyes Nightly Alyx Tapia APRN - CNP   1 drop at 10/24/24 2018    metoprolol succinate (TOPROL XL) extended release tablet 50 mg   AM    RDW 17.7 10/25/2024 04:36 AM     10/25/2024 04:36 AM    MPV 10.8 10/25/2024 04:36 AM     BMP:   Lab Results   Component Value Date/Time     10/25/2024 04:36 AM    K 4.8 10/25/2024 04:36 AM    K 3.7 09/20/2022 05:33 PM     10/25/2024 04:36 AM    CO2 21 10/25/2024 04:36 AM    BUN 27 10/25/2024 04:36 AM    CREATININE 1.0 10/25/2024 04:36 AM    CALCIUM 9.0 10/25/2024 04:36 AM    GFRAA >60 09/23/2022 04:34 AM    LABGLOM 56 10/25/2024 04:36 AM    LABGLOM >60 11/12/2023 03:49 AM     Magnesium:    Lab Results   Component Value Date/Time    MG 2.0 10/25/2024 04:36 AM     Cardiac Enzymes:   Lab Results   Component Value Date    TROPHS 36 (H) 11/09/2023    TROPHS 39 (H) 11/09/2023    TROPHS 15 (H) 09/20/2022      PT/INR:    Lab Results   Component Value Date/Time    PROTIME 16.9 10/30/2023 01:57 PM    INR 1.5 10/30/2023 01:57 PM     TSH:    Lab Results   Component Value Date/Time    TSH 4.32 10/22/2024 06:15 PM     Rhythm Strip: atrial fibrillation.    NM stress 7/2019: EF 83%. Perfusion imaging normal. NWMA, Low risk    TTE 9/2022: EF 60-65%. NWMA, Inderminate DD. Normal RV.No ASD/PFO. Mild MAC.RVSP 34 mmhg. Mild AR.    TTE 2/2023 [facility]: EF 60%. Normal RV. Moderate MR with prolapse. Moderate TR. Moderate AR.     TTE 4/17/2023 [facility]: EF 60%. Normal RV size and function. Normal atria.Mild MR, moderate TR, moderate AR. RVSP 45 mmhg. Mild MI    Echo Summary 10/24/2024:    Left Ventricle: Low normal left ventricular systolic function with a visually estimated EF of 50 - 55%. Left ventricle size is normal. Normal wall thickness. Normal wall motion.    Right Ventricle: Right ventricle is mildly dilated. Reduced systolic function. TAPSE is 1.2 cm.    Left Atrium: Left atrial volume index is severely increased (>48 mL/m2).    Aortic Valve: Mild to moderate regurgitation. AV PHT is 520.4 millisecond.    Mitral Valve: Mildly thickened leaflets. Mild to moderate regurgitation.    Tricuspid Valve: Moderate

## 2024-10-25 NOTE — CARE COORDINATION
CM notified patient wants [facility], CM met with patient and patients daughter at bedside. Confirmed that they do want [facility], referral made with Nimisha from [facility], await to hear back. [facility] order completed.  Nadira CALDERON, RN  Case Management     Addendum: [facility] can accept.  Nadira CALDERON, RN  Case Management

## 2024-10-25 NOTE — PROGRESS NOTES
Spiritual Health History and Assessment/Progress Note  [facility]     Encounter, Rituals, Rites and Sacraments,  ,  ,      Name: Malia Choi MRN: [id number]    Age: 83 y.o.     Sex: female   Language: English   Tenriism: Muslim   Fluid overload     Date: 10/25/2024                           Spiritual Assessment began in [facility] SURG        Referral/Consult From: Rounding   Encounter Overview/Reason:  Encounter, Rituals, Rites and Sacraments  Service Provided For: Patient    Saranya, Belief, Meaning:   Patient is connected with a saranya tradition or spiritual practice  Family/Friends are connected with a saranya tradition or spiritual practice      Importance and Influence:  Patient has no beliefs influential to healthcare decision-making identified during this visit  Family/Friends have no beliefs influential to healthcare decision-making identified during this visit    Community:  Patient feels well-supported. Support system includes: Children  Family/Friends feel well-supported. Support system includes: Unknown    Assessment and Plan of Care:     Patient Interventions include: Provided sacramental/Adventist ritual  Family/Friends Interventions include: Affirmed coping skills/support systems    Patient Plan of Care: Spiritual Care available upon further referral  Family/Friends Plan of Care: Spiritual Care available upon further referral    Electronically signed by Chaplain Ginette on 10/25/2024 at 6:40 PM

## 2024-10-25 NOTE — PROGRESS NOTES
Occupational Therapy  Pt declined to participate in therapy this PM.  Many excuses for not wanting to get OOB.  Will attempt another time when pt willing to participate.   Dotty QUICK/ONAEL [id number]

## 2024-10-25 NOTE — PROGRESS NOTES
Physician Progress Note      PATIENT:               PRAVEEN MCNAMARA  [facility] #:                  [id number]  :                       1940  ADMIT DATE:       10/22/2024 12:50 PM  DISCH DATE:  RESPONDING  PROVIDER #:        Heather Acharya DO          QUERY TEXT:    Dear Cardiologist,    Pt noted to have \"Acute on chronic diastolic and right-sided CHF... RV   dysfunction, mild-mod AI, mild-mod MR, mod TR, mod PH \" per Cardiology 10/25.   If possible, please document in progress notes and discharge summary the   etiology of CHF, if able to be determined.    The medical record reflects the following:  Risk Factors:  mild-mod AI, mild-mod MR, mod TR, mod PH, HTN, CKD 2, CAD  Clinical Indicators: Per H/P,\"...Altered Mental Status (Increased confusion,   hx of dementia)...Imaging was concern for pneumonic infiltrate.  She was   started on IV antibiotics...Fluid overload...Does not appear to be in CHF at   all...IV Bumex...Pneumonia Hypokalemia ...\" Per CARDIOLOGY 10/25,\"... Acute on   chronic diastolic and right-sided CHF... RV dysfunction, mild-mod AI,   mild-mod MR, mod TR, mod PH...Permanent Afib...IV daily bumex...\" BNP   11,509-->6,186.  Treatment: IV Bumex    Thank you,  Camila Samuel RN CCDS  Clinical Documentation Improvement Specialist  Phone# [phone]  Options provided:  -- CHF due to Hypertensive Heart Disease  -- CHF due to Hypertensive Heart Disease and CAD  -- CHF not due to Hypertension but due to CAD  -- CHF due to Hypertensive Heart Disease and Valvular Heart Disease  -- CHF not due to Hypertension but due to valvular heart disease  -- CHF due to HTN, CAD and valvular disease  -- Other - I will add my own diagnosis  -- Disagree - Not applicable / Not valid  -- Disagree - Clinically unable to determine / Unknown  -- Refer to Clinical Documentation Reviewer    PROVIDER RESPONSE TEXT:    This patient has CHF not due to hypertensive heart disease, CHF is due to   valvular heart disease.    Query created  by: Camila Samuel on 10/25/2024 4:46 PM      Electronically signed by:  Heather Acharya DO 10/25/2024 4:47 PM

## 2024-10-25 NOTE — PLAN OF CARE
Problem: ABCDS Injury Assessment  Goal: Absence of physical injury  10/25/2024 1109 by Oumar Bernabe RN  Outcome: Progressing  10/25/2024 0102 by Jacque Melendrez RN  Outcome: Progressing     Problem: Discharge Planning  Goal: Discharge to home or other facility with appropriate resources  10/25/2024 1109 by Oumar Bernabe RN  Outcome: Progressing  10/25/2024 0102 by Jacque Melendrez RN  Outcome: Progressing     Problem: Chronic Conditions and Co-morbidities  Goal: Patient's chronic conditions and co-morbidity symptoms are monitored and maintained or improved  10/25/2024 1109 by Oumar Bernabe RN  Outcome: Progressing  10/25/2024 0102 by Jacque Melendrez RN  Outcome: Progressing     Problem: Safety - Adult  Goal: Free from fall injury  10/25/2024 1109 by Oumar Bernabe RN  Outcome: Progressing  10/25/2024 0102 by Jacque Melendrez RN  Outcome: Progressing     Problem: Skin/Tissue Integrity  Goal: Absence of new skin breakdown  Description: 1.  Monitor for areas of redness and/or skin breakdown  2.  Assess vascular access sites hourly  3.  Every 4-6 hours minimum:  Change oxygen saturation probe site  4.  Every 4-6 hours:  If on nasal continuous positive airway pressure, respiratory therapy assess nares and determine need for appliance change or resting period.  10/25/2024 1109 by Oumar Bernabe, RN  Outcome: Progressing  10/25/2024 0102 by Jacuqe Melendrez RN  Outcome: Progressing

## 2024-10-25 NOTE — PROGRESS NOTES
Carley bhatia DC from Cardiology POV.    Electronically signed by Mili Brasher RN on 10/25/2024 at 4:00 PM

## 2024-10-25 NOTE — PROGRESS NOTES
[facility]                                Progress note    Subjective:    The patient is awake and alert.    Feels really well, denies any acute complaints  At her baseline status of dementia  Daughter at bedside    Objective:    /64   Pulse 66   Temp 97.5 °F (36.4 °C) (Axillary)   Resp 16   Ht 1.575 m (5' 2\")   Wt 61.2 kg (135 lb)   SpO2 96%   BMI 24.69 kg/m²     In: 120 [P.O.:120]  Out: 900   In: 120   Out: 900 [Urine:900]    General appearance: NAD, conversant, but with dementia  HEENT: AT/NC, MMM  Neck: FROM, supple  Lungs: Clear to auscultation-no rales, no rhonchi no wheeze  CV: RRR, no MRGs  Vasc: Radial pulses 2+  Abdomen: Soft, non-tender; no masses or HSM  Extremities: No peripheral edema or digital cyanosis  Skin: no rash, lesions or ulcers    Recent Labs     10/23/24  0305 10/24/24  0300 10/25/24  0436   WBC 4.7 5.6 5.8   HGB 10.8* 12.1 11.5   HCT 34.9 38.9 36.0    171 193       Recent Labs     10/23/24  0305 10/24/24  0300 10/25/24  0436    137 137   K 3.2* 3.7 4.8    102 105   CO2 21* 22 21*   BUN 22 24* 27*   CREATININE 1.1* 1.1* 1.0   CALCIUM 8.4* 8.7 9.0     10/22/2024 CXR:  1. Right lower lobe atelectasis/pneumonia and small right pleural effusion,  both of which are new.  2. Stable cardiomegaly.      ASSESSMENT:     Principal Problem:    Fluid overload  Resolved Problems:    * No resolved hospital problems. *  Pneumonia  Hypokalemia  CKD stage IIIa  Anemia        PLAN:     She is stable.  Confusion is definitely present.  Nonfocal.  Chest x-ray showed possible pneumonic infiltrate.  We can pursue that line of treatment.  CKD at baseline with appropriate anemia due to CKD.  Does not appear to be in CHF at all.  Watch carefully for any changes in her vitals so far she has been good.  Due to the pleural effusion appearance we can get a echo was pretty high.  She is on IV Bumex.  Can request cardiology to follow     10/24/2024  I do not really see

## 2024-10-27 ENCOUNTER — HOSPITAL ENCOUNTER (EMERGENCY)
Age: 84
Discharge: HOME | End: 2024-10-27
Attending: EMERGENCY MEDICINE
Payer: MEDICARE

## 2024-10-27 ENCOUNTER — APPOINTMENT (OUTPATIENT)
Dept: CT IMAGING | Age: 84
End: 2024-10-27
Payer: MEDICARE

## 2024-10-27 ENCOUNTER — APPOINTMENT (OUTPATIENT)
Dept: GENERAL RADIOLOGY | Age: 84
End: 2024-10-27
Payer: MEDICARE

## 2024-10-27 VITALS
RESPIRATION RATE: 15 BRPM | BODY MASS INDEX: 24.84 KG/M2 | OXYGEN SATURATION: 97 % | SYSTOLIC BLOOD PRESSURE: 144 MMHG | HEIGHT: 62 IN | TEMPERATURE: 97.3 F | DIASTOLIC BLOOD PRESSURE: 71 MMHG | WEIGHT: 135 LBS | HEART RATE: 69 BPM

## 2024-10-27 DIAGNOSIS — R55 SYNCOPE AND COLLAPSE: Primary | ICD-10-CM

## 2024-10-27 LAB
ALBUMIN SERPL-MCNC: 3.5 G/DL (ref 3.5–5.2)
ALP SERPL-CCNC: 115 U/L (ref 35–104)
ALT SERPL-CCNC: 11 U/L (ref 0–32)
ANION GAP SERPL CALCULATED.3IONS-SCNC: 13 MMOL/L (ref 7–16)
AST SERPL-CCNC: 24 U/L (ref 0–31)
BASOPHILS # BLD: 0.08 K/UL (ref 0–0.2)
BASOPHILS NFR BLD: 1 % (ref 0–2)
BILIRUB SERPL-MCNC: 0.6 MG/DL (ref 0–1.2)
BUN SERPL-MCNC: 32 MG/DL (ref 6–23)
CALCIUM SERPL-MCNC: 8.7 MG/DL (ref 8.6–10.2)
CHLORIDE SERPL-SCNC: 102 MMOL/L (ref 98–107)
CO2 SERPL-SCNC: 23 MMOL/L (ref 22–29)
CREAT SERPL-MCNC: 1.2 MG/DL (ref 0.5–1)
EOSINOPHIL # BLD: 0.3 K/UL (ref 0.05–0.5)
EOSINOPHILS RELATIVE PERCENT: 5 % (ref 0–6)
ERYTHROCYTE [DISTWIDTH] IN BLOOD BY AUTOMATED COUNT: 17.9 % (ref 11.5–15)
GFR, ESTIMATED: 43 ML/MIN/1.73M2
GLUCOSE SERPL-MCNC: 176 MG/DL (ref 74–99)
HCT VFR BLD AUTO: 39.9 % (ref 34–48)
HGB BLD-MCNC: 12.2 G/DL (ref 11.5–15.5)
IMM GRANULOCYTES # BLD AUTO: <0.03 K/UL (ref 0–0.58)
IMM GRANULOCYTES NFR BLD: 0 % (ref 0–5)
LACTATE BLDV-SCNC: 1.7 MMOL/L (ref 0.5–2.2)
LYMPHOCYTES NFR BLD: 1.14 K/UL (ref 1.5–4)
LYMPHOCYTES RELATIVE PERCENT: 18 % (ref 20–42)
MCH RBC QN AUTO: 25.4 PG (ref 26–35)
MCHC RBC AUTO-ENTMCNC: 30.6 G/DL (ref 32–34.5)
MCV RBC AUTO: 83 FL (ref 80–99.9)
MONOCYTES NFR BLD: 0.38 K/UL (ref 0.1–0.95)
MONOCYTES NFR BLD: 6 % (ref 2–12)
NEUTROPHILS NFR BLD: 70 % (ref 43–80)
NEUTS SEG NFR BLD: 4.51 K/UL (ref 1.8–7.3)
PLATELET # BLD AUTO: 212 K/UL (ref 130–450)
PMV BLD AUTO: 9.9 FL (ref 7–12)
POTASSIUM SERPL-SCNC: 4 MMOL/L (ref 3.5–5)
PROT SERPL-MCNC: 6.8 G/DL (ref 6.4–8.3)
RBC # BLD AUTO: 4.81 M/UL (ref 3.5–5.5)
SODIUM SERPL-SCNC: 138 MMOL/L (ref 132–146)
TROPONIN I SERPL HS-MCNC: 26 NG/L (ref 0–9)
TROPONIN I SERPL HS-MCNC: 29 NG/L (ref 0–9)
WBC OTHER # BLD: 6.4 K/UL (ref 4.5–11.5)

## 2024-10-27 PROCEDURE — 99285 EMERGENCY DEPT VISIT HI MDM: CPT

## 2024-10-27 PROCEDURE — 83605 ASSAY OF LACTIC ACID: CPT

## 2024-10-27 PROCEDURE — 93005 ELECTROCARDIOGRAM TRACING: CPT

## 2024-10-27 PROCEDURE — 70450 CT HEAD/BRAIN W/O DYE: CPT

## 2024-10-27 PROCEDURE — 85025 COMPLETE CBC W/AUTO DIFF WBC: CPT

## 2024-10-27 PROCEDURE — 80053 COMPREHEN METABOLIC PANEL: CPT

## 2024-10-27 PROCEDURE — 71045 X-RAY EXAM CHEST 1 VIEW: CPT

## 2024-10-27 PROCEDURE — 84484 ASSAY OF TROPONIN QUANT: CPT
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
## 2024-10-27 NOTE — DISCHARGE INSTRUCTIONS
Follow-up with your PCP.  Return to the ED if you notice worsening or repeat symptoms, loss of consciousness, chest pain or shortness of breath, or neurologic deficits to include weakness not previously present, loss of sensation, or changes to facial expressions.

## 2024-10-28 LAB
EKG ATRIAL RATE: 300 BPM
EKG Q-T INTERVAL: 526 MS
EKG QRS DURATION: 136 MS
EKG QTC CALCULATION (BAZETT): 479 MS
EKG R AXIS: -74 DEGREES
EKG T AXIS: 159 DEGREES
EKG VENTRICULAR RATE: 50 BPM

## 2024-10-28 PROCEDURE — 93010 ELECTROCARDIOGRAM REPORT: CPT | Performed by: INTERNAL MEDICINE

## 2024-10-28 NOTE — PROGRESS NOTES
CLINICAL PHARMACY NOTE: MEDS TO BEDS    Total # of Prescriptions Filled: 4   The following medications were delivered to the patient:  Doxycyl hyclate 100 mg cap  Spironolactone 25 mg  Cefdinir 300 mg  Bumex 0.5 mg    Additional Documentation:

## 2024-10-28 NOTE — PROGRESS NOTES
Physician Progress Note      PATIENT:               PRAVEEN MCNAMARA  [facility] #:                  [id number]  :                       1940  ADMIT DATE:       10/22/2024 12:50 PM  DISCH DATE:        10/25/2024 5:48 PM  RESPONDING  PROVIDER #:        Eden Benson MD          QUERY TEXT:    Dear Attending Physician,    Pt noted to have \"Acute on chronic diastolic and right-sided CHF... RV   dysfunction, mild-mod AI, mild-mod MR, mod TR, mod PH \"10/25 by ordered   Cardiology consultant.  If possible, please document in progress notes and   discharge summary:    The medical record reflects the following:  Risk Factors:  mild-mod AI, mild-mod MR, mod TR, mod PH, HTN, CKD 2, CAD  Clinical Indicators: Per H/P,\"...Altered Mental Status (Increased confusion,   hx of dementia)...Imaging was concern for pneumonic infiltrate.  She was   started on IV antibiotics...Fluid overload...Does not appear to be in CHF at   all...IV Bumex...Pneumonia Hypokalemia ...\" Per CARDIOLOGY 10/25,\"... Acute on   chronic diastolic and right-sided CHF... RV dysfunction, mild-mod AI,   mild-mod MR, mod TR, mod PH...Permanent Afib...IV daily bumex...\" BNP   11,509-->6,186.  Treatment: IV Bumex    Thank you,  Camila Samuel RN [facility]  Clinical Documentation Improvement Specialist  Phone# [phone]  Options provided:  -- Acute and chronic diastolic and right-sided CHF confirmed present on   admission.  -- Acute and chronic diastolic and right-sided CHF ruled out.  -- Defer to Cardiology consultant documentation regarding Acute and chronic   diastolic and right-sided CHF ruled out.  -- Other - I will add my own diagnosis  -- Disagree - Not applicable / Not valid  -- Disagree - Clinically unable to determine / Unknown  -- Refer to Clinical Documentation Reviewer    PROVIDER RESPONSE TEXT:    Acute and chronic diastolic and right-sided CHF confirmed present on   admission.    Query created by: Camila Samuel on 10/25/2024 4:52 PM      Electronically signed

## 2024-10-31 NOTE — DISCHARGE SUMMARY
[facility]   Discharge summary   Patient ID:  Malia Choi  [id number]  83 y.o.  1940    Admit date: 10/22/2024    Discharge date and time: 10/25/2024    Admission Diagnoses:   Patient Active Problem List   Diagnosis    Posterior choroidal artery infarction (HCC)    Persistent atrial fibrillation (HCC)    Essential hypertension    Mixed hyperlipidemia    Stenosis of left carotid artery    Bilateral carotid artery stenosis    GI bleed    Cerebrovascular accident (CVA) (HCC)    Atrial fibrillation (HCC)    Falls, initial encounter    Fluid overload    Metabolic encephalopathy       Discharge Diagnoses: Fluid overload, PNA    Consults: cardiology    Procedures:     Hospital Course: The patient is a 83 y.o. female of Jesus Tatum MD     Ms. Malia Choi, a 83 y.o. year old female  who  has a past medical history of Arthritis, Atrial fibrillation (HCC), Carotid stenosis, right, Cerebral artery occlusion with cerebral infarction (HCC), Glaucoma, History of blood transfusion, Hx of blood clots, Hyperlipidemia, Hypertension, IBS (irritable bowel syndrome), Nausea, Nosebleed, and UTI (lower urinary tract infection).      This is a 89-year-old female with history as stated above.  She lives at home with her daughter.  She was brought into the ER where she had presented with confusion although no vomiting or nausea.  This has been going on for about 3 days.  The daughter felt that she had a UTI so she brought her to the ER.  There is a concern that she has a premorbid history of dementia and it manifest more when she is sick.  Patient did not complain of shortness of breath.  She is quiteDisoriented but extremely pleasant     Imaging was concern for pneumonic infiltrate.  She was started on IV antibiotics.  She has not received any more doses after the ER she received IV Doxy and IV Rocephin.    10/24/2024  I do not really see any evidence of pneumonia we will transition antibiotics to p.o. tomorrow

## 2024-12-01 ENCOUNTER — APPOINTMENT (OUTPATIENT)
Dept: CT IMAGING | Age: 84
End: 2024-12-01
Payer: MEDICARE

## 2024-12-01 ENCOUNTER — HOSPITAL ENCOUNTER (EMERGENCY)
Age: 84
Discharge: HOME | End: 2024-12-01
Attending: EMERGENCY MEDICINE
Payer: MEDICARE

## 2024-12-01 ENCOUNTER — APPOINTMENT (OUTPATIENT)
Dept: GENERAL RADIOLOGY | Age: 84
End: 2024-12-01
Payer: MEDICARE

## 2024-12-01 VITALS
RESPIRATION RATE: 12 BRPM | DIASTOLIC BLOOD PRESSURE: 79 MMHG | SYSTOLIC BLOOD PRESSURE: 141 MMHG | TEMPERATURE: 98.9 F | OXYGEN SATURATION: 100 % | HEART RATE: 66 BPM

## 2024-12-01 DIAGNOSIS — S09.90XA CLOSED HEAD INJURY, INITIAL ENCOUNTER: ICD-10-CM

## 2024-12-01 DIAGNOSIS — R55 SYNCOPE AND COLLAPSE: Primary | ICD-10-CM

## 2024-12-01 DIAGNOSIS — R11.2 NAUSEA AND VOMITING, UNSPECIFIED VOMITING TYPE: ICD-10-CM

## 2024-12-01 LAB
ALBUMIN SERPL-MCNC: 3.7 G/DL (ref 3.5–5.2)
ALP SERPL-CCNC: 110 U/L (ref 35–104)
ALT SERPL-CCNC: 15 U/L (ref 0–32)
ANION GAP SERPL CALCULATED.3IONS-SCNC: 14 MMOL/L (ref 7–16)
AST SERPL-CCNC: 27 U/L (ref 0–31)
BACTERIA URNS QL MICRO: ABNORMAL
BASOPHILS # BLD: 0.06 K/UL (ref 0–0.2)
BASOPHILS NFR BLD: 1 % (ref 0–2)
BILIRUB SERPL-MCNC: 0.8 MG/DL (ref 0–1.2)
BILIRUB UR QL STRIP: NEGATIVE
BUN SERPL-MCNC: 16 MG/DL (ref 6–23)
CALCIUM SERPL-MCNC: 9.2 MG/DL (ref 8.6–10.2)
CASTS #/AREA URNS LPF: ABNORMAL /LPF
CHLORIDE SERPL-SCNC: 103 MMOL/L (ref 98–107)
CK SERPL-CCNC: 48 U/L (ref 20–180)
CLARITY UR: CLEAR
CO2 SERPL-SCNC: 22 MMOL/L (ref 22–29)
COLOR UR: YELLOW
CREAT SERPL-MCNC: 1.1 MG/DL (ref 0.5–1)
EOSINOPHIL # BLD: 0.45 K/UL (ref 0.05–0.5)
EOSINOPHILS RELATIVE PERCENT: 7 % (ref 0–6)
EPI CELLS #/AREA URNS HPF: ABNORMAL /HPF
ERYTHROCYTE [DISTWIDTH] IN BLOOD BY AUTOMATED COUNT: 19.2 % (ref 11.5–15)
GFR, ESTIMATED: 51 ML/MIN/1.73M2
GLUCOSE SERPL-MCNC: 156 MG/DL (ref 74–99)
GLUCOSE UR STRIP-MCNC: NEGATIVE MG/DL
HCT VFR BLD AUTO: 42.4 % (ref 34–48)
HGB BLD-MCNC: 13 G/DL (ref 11.5–15.5)
HGB UR QL STRIP.AUTO: NEGATIVE
IMM GRANULOCYTES # BLD AUTO: <0.03 K/UL (ref 0–0.58)
IMM GRANULOCYTES NFR BLD: 0 % (ref 0–5)
KETONES UR STRIP-MCNC: NEGATIVE MG/DL
LEUKOCYTE ESTERASE UR QL STRIP: NEGATIVE
LYMPHOCYTES NFR BLD: 1.02 K/UL (ref 1.5–4)
LYMPHOCYTES RELATIVE PERCENT: 16 % (ref 20–42)
MCH RBC QN AUTO: 26.1 PG (ref 26–35)
MCHC RBC AUTO-ENTMCNC: 30.7 G/DL (ref 32–34.5)
MCV RBC AUTO: 85.1 FL (ref 80–99.9)
MONOCYTES NFR BLD: 0.29 K/UL (ref 0.1–0.95)
MONOCYTES NFR BLD: 5 % (ref 2–12)
NEUTROPHILS NFR BLD: 71 % (ref 43–80)
NEUTS SEG NFR BLD: 4.57 K/UL (ref 1.8–7.3)
NITRITE UR QL STRIP: NEGATIVE
PH UR STRIP: 6 [PH] (ref 5–9)
PLATELET # BLD AUTO: 170 K/UL (ref 130–450)
PMV BLD AUTO: 10.1 FL (ref 7–12)
POTASSIUM SERPL-SCNC: 3.7 MMOL/L (ref 3.5–5)
PROT SERPL-MCNC: 7 G/DL (ref 6.4–8.3)
PROT UR STRIP-MCNC: NEGATIVE MG/DL
RBC # BLD AUTO: 4.98 M/UL (ref 3.5–5.5)
RBC #/AREA URNS HPF: ABNORMAL /HPF
SARS-COV-2 RDRP RESP QL NAA+PROBE: NOT DETECTED
SODIUM SERPL-SCNC: 139 MMOL/L (ref 132–146)
SP GR UR STRIP: 1.02 (ref 1–1.03)
SPECIMEN DESCRIPTION: NORMAL
TROPONIN I SERPL HS-MCNC: 22 NG/L (ref 0–9)
UROBILINOGEN UR STRIP-ACNC: 0.2 EU/DL (ref 0–1)
WBC #/AREA URNS HPF: ABNORMAL /HPF
WBC OTHER # BLD: 6.4 K/UL (ref 4.5–11.5)

## 2024-12-01 PROCEDURE — 93005 ELECTROCARDIOGRAM TRACING: CPT | Performed by: EMERGENCY MEDICINE

## 2024-12-01 PROCEDURE — 96361 HYDRATE IV INFUSION ADD-ON: CPT

## 2024-12-01 PROCEDURE — 70450 CT HEAD/BRAIN W/O DYE: CPT

## 2024-12-01 PROCEDURE — 87086 URINE CULTURE/COLONY COUNT: CPT

## 2024-12-01 PROCEDURE — 85025 COMPLETE CBC W/AUTO DIFF WBC: CPT

## 2024-12-01 PROCEDURE — 82550 ASSAY OF CK (CPK): CPT

## 2024-12-01 PROCEDURE — 2580000003 HC RX 258: Performed by: EMERGENCY MEDICINE

## 2024-12-01 PROCEDURE — 87635 SARS-COV-2 COVID-19 AMP PRB: CPT

## 2024-12-01 PROCEDURE — 84484 ASSAY OF TROPONIN QUANT: CPT

## 2024-12-01 PROCEDURE — 81001 URINALYSIS AUTO W/SCOPE: CPT

## 2024-12-01 PROCEDURE — 99285 EMERGENCY DEPT VISIT HI MDM: CPT

## 2024-12-01 PROCEDURE — 80053 COMPREHEN METABOLIC PANEL: CPT

## 2024-12-01 PROCEDURE — 96360 HYDRATION IV INFUSION INIT: CPT

## 2024-12-01 PROCEDURE — 71045 X-RAY EXAM CHEST 1 VIEW: CPT

## 2024-12-01 RX ORDER — 0.9 % SODIUM CHLORIDE 0.9 %
1000 INTRAVENOUS SOLUTION INTRAVENOUS ONCE
Status: COMPLETED | OUTPATIENT
Start: 2024-12-01 | End: 2024-12-01

## 2024-12-01 RX ADMIN — SODIUM CHLORIDE 1000 ML: 9 INJECTION, SOLUTION INTRAVENOUS at 14:33

## 2024-12-01 ASSESSMENT — PAIN - FUNCTIONAL ASSESSMENT: PAIN_FUNCTIONAL_ASSESSMENT: NONE - DENIES PAIN

## 2024-12-01 ASSESSMENT — LIFESTYLE VARIABLES
HOW MANY STANDARD DRINKS CONTAINING ALCOHOL DO YOU HAVE ON A TYPICAL DAY: PATIENT DOES NOT DRINK
HOW OFTEN DO YOU HAVE A DRINK CONTAINING ALCOHOL: NEVER

## 2024-12-01 NOTE — ED PROVIDER NOTES
HPI:  12/1/24, Time: 3:08 PM ENRIQUE Choi is a 84 y.o. female presenting to the ED for 2 syncopal episodes.  Patient presents from home.  She is a history of dementia, so she is a poor historian and unable to provide any meaningful history.  Her family is at bedside providing history.  Patient lives at home with her daughter.  Son states that today she had 2 syncopal episodes but fell onto the bed.  She did not strike her head. States that the episodes were followed by nonbloody, nonbilious emesis and nonbloody diarrhea.  Patient is currently back to her neurologic baseline.  She normally uses a walker for ambulation.  No fevers.  Patient is on Eliquis.  Son believes that the patient does need some extra help at home and may benefit from rehab or placement.    Patient is DNR-CC.  Discussed goals of care with family at bedside.  They would like blood work and imaging but no heroic measures.    --------------------------------------------- PAST HISTORY ---------------------------------------------  Past Medical History:  has a past medical history of Arthritis, Atrial fibrillation (HCC), Carotid stenosis, right, Cerebral artery occlusion with cerebral infarction (HCC), Glaucoma, History of blood transfusion, Hx of blood clots, Hyperlipidemia, Hypertension, IBS (irritable bowel syndrome), Nausea, Nosebleed, and UTI (lower urinary tract infection).    Past Surgical History:  has a past surgical history that includes Appendectomy; eye surgery (Bilateral); Carotid endarterectomy (Left, 8/22/2019); and hip surgery (Left, 10/31/2023).    Social History:  reports that she quit smoking about 38 years ago. Her smoking use included cigarettes. She has never used smokeless tobacco. She reports that she does not drink alcohol and does not use drugs.    Family History: family history includes Other in her father; Other (age of onset: 84) in her mother.     The patient’s home medications have been

## 2024-12-01 NOTE — DISCHARGE INSTR - COC
Continuity of Care Form    Patient Name: Malia Choi   :  1940  MRN:  [id number]    Admit date:  2024  Discharge date:  ***    Code Status Order: Prior   Advance Directives:   Advance Care Flowsheet Documentation             Admitting Physician:  No admitting provider for patient encounter.  PCP: Jesus Tatum MD    Discharging Nurse: ***  Discharging Hospital Unit/Room#:   Discharging Unit Phone Number: ***    Emergency Contact:   Extended Emergency Contact Information  Primary Emergency Contact: Jennifer Choi  Address: [address] [place] [address]  Home Phone: [phone]  Work Phone: [phone]  Mobile Phone: [phone]  Relation: Child  Preferred language: English  Secondary Emergency Contact: Owen Choi \"Madhu\"  Address: [address] [place] [address]  Home Phone: [phone]  Mobile Phone: [phone]  Relation: Child  Preferred language: English   needed? No    Past Surgical History:  Past Surgical History:   Procedure Laterality Date    APPENDECTOMY      CAROTID ENDARTERECTOMY Left 2019    LEFT CAROTID ENDARTERECTOMY performed by [facility]aman [facility]    EYE SURGERY Bilateral     cataracts    HIP SURGERY Left 10/31/2023    left HIP HEMIARTHROPLASTY performed by Lawson Dugan DO at [facility]       Immunization History:   Immunization History   Administered Date(s) Administered    COVID-19, MODERNA Bivalent, (age 12y+), IM, 50 mcg/0.5 mL 2022       Active Problems:  Patient Active Problem List   Diagnosis Code    Posterior choroidal artery infarction (HCC) I63.9    Persistent atrial fibrillation (HCC) I48.19    Essential hypertension I10    Mixed hyperlipidemia E78.2    Stenosis of left carotid artery I65.22    Bilateral carotid artery stenosis I65.23    GI bleed K92.2    Cerebrovascular accident (CVA) (HCC) I63.9    Atrial fibrillation (HCC) I48.91

## 2024-12-02 LAB
EKG ATRIAL RATE: 50 BPM
EKG Q-T INTERVAL: 496 MS
EKG QRS DURATION: 132 MS
EKG QTC CALCULATION (BAZETT): 482 MS
EKG R AXIS: -45 DEGREES
EKG T AXIS: -176 DEGREES
EKG VENTRICULAR RATE: 57 BPM
MICROORGANISM SPEC CULT: NO GROWTH
SERVICE CMNT-IMP: NORMAL
SPECIMEN DESCRIPTION: NORMAL

## 2024-12-02 PROCEDURE — 93010 ELECTROCARDIOGRAM REPORT: CPT | Performed by: INTERNAL MEDICINE

## (undated) DEVICE — 3 ML SYRINGE LUER-LOCK TIP: Brand: MONOJECT

## (undated) DEVICE — 24" (61 CM) ARTERIAL PRESSURE TUBING: Brand: ICU MEDICAL

## (undated) DEVICE — SOLUTION IRRIG 3000ML 0.9% SOD CHL USP UROMATIC PLAS CONT

## (undated) DEVICE — PENCIL,CAUTERY,ROCKER,PTFE,15'CORD: Brand: MEDLINE INDUSTRIES, INC.

## (undated) DEVICE — GOWN,SIRUS,FABRNF,L,20/CS: Brand: MEDLINE

## (undated) DEVICE — CATHETER ETER IV 20GA L1IN POLYUR STR RADPQ INTROCAN SFTY

## (undated) DEVICE — ELECTRODE PT RET AD L9FT HI MOIST COND ADH HYDRGEL CORDED

## (undated) DEVICE — BLADE CLIPPER GEN PURP NS

## (undated) DEVICE — NEEDLE HYPO 26GA L0.625IN TAN POLYPR HUB S STL REG BVL STR

## (undated) DEVICE — TOTAL TRAY, 16FR 10ML SIL FOLEY, URN: Brand: MEDLINE

## (undated) DEVICE — 72" ARTERIAL PRESSURE TUBING: Brand: ICU MEDICAL

## (undated) DEVICE — CATHETER URETH 22FR L16IN LTX INTMIT ROB MOD BARDX

## (undated) DEVICE — GOWN,SIRUS,FABRNF,XL,20/CS: Brand: MEDLINE

## (undated) DEVICE — MAGNETIC INSTR DRAPE 20X16: Brand: MEDLINE INDUSTRIES, INC.

## (undated) DEVICE — NEEDLE HYPO 21GA L1.5IN GRN POLYPR HUB S STL REG BVL STR

## (undated) DEVICE — SURGICAL PROCEDURE PACK VASC MAJ CUST

## (undated) DEVICE — LABEL MED 4 IN SURG PANEL W/ PEN STRL

## (undated) DEVICE — NEEDLE HYPO 25GA L0.625IN BLU POLYPR HUB S STL REG BVL STR

## (undated) DEVICE — SET SURG INSTR ART III

## (undated) DEVICE — STANDARD HYPODERMIC NEEDLE,POLYPROPYLENE HUB: Brand: MONOJECT

## (undated) DEVICE — Z INACTIVE USE 2535480 CLIP LIG M BLU TI HRT SHP WIRE HORZ 180 PER BX

## (undated) DEVICE — PATIENT RETURN ELECTRODE, SINGLE-USE, CONTACT QUALITY MONITORING, ADULT, WITH 9FT CORD, FOR PATIENTS WEIGING OVER 33LBS. (15KG): Brand: MEGADYNE

## (undated) DEVICE — PACK,LAPAROTOMY,NO GOWNS: Brand: MEDLINE

## (undated) DEVICE — DRESSING FOAM W22XL25CM FILVE LAYR FOAM DP DEF SAFETAC

## (undated) DEVICE — 3M™ IOBAN™ 2 ANTIMICROBIAL INCISE DRAPE 6650EZ: Brand: IOBAN™ 2

## (undated) DEVICE — SYRINGE 20ML LL S/C 50

## (undated) DEVICE — LOOP VES W25MM THK1MM MAXI RED SIL FLD REPELLENT 100 PER

## (undated) DEVICE — SET INSTR ART 1

## (undated) DEVICE — DRESSING,GAUZE,XEROFORM,CURAD,5"X9",ST: Brand: CURAD

## (undated) DEVICE — DOUBLE BASIN SET: Brand: MEDLINE INDUSTRIES, INC.

## (undated) DEVICE — STAPLER SKIN L39MM DIA0.53MM CRWN 5.7MM S STL FIX HD PROX

## (undated) DEVICE — SKIN AFFIX SURG ADHESIVE 72/CS 0.55ML: Brand: MEDLINE

## (undated) DEVICE — CAROTID ARTERY SHUNT KIT,RADIOPAQUE LINE, STRAIGHT: Brand: ARGYLE

## (undated) DEVICE — GLOVE SURG SZ 75 L12IN FNGR THK94MIL TRNSLUC YEL LTX

## (undated) DEVICE — STRYKER PERFORMANCE SERIES SAGITTAL BLADE: Brand: STRYKER PERFORMANCE SERIES

## (undated) DEVICE — TOWEL,OR,DSP,ST,WHITE,DLX,4/PK,20PK/CS: Brand: MEDLINE

## (undated) DEVICE — TOWEL,OR,DSP,ST,BLUE,STD,6/PK,12PK/CS: Brand: MEDLINE

## (undated) DEVICE — SOLUTION IV IRRIG 500ML 0.9% SODIUM CHL 2F7123

## (undated) DEVICE — BIT DRL L5IN DIA2.8MM STD ST S STL TWST BUSA

## (undated) DEVICE — CLIP INT SM TI EZ LD LIG SYS WECK HORZ